# Patient Record
Sex: FEMALE | Race: WHITE | NOT HISPANIC OR LATINO | URBAN - METROPOLITAN AREA
[De-identification: names, ages, dates, MRNs, and addresses within clinical notes are randomized per-mention and may not be internally consistent; named-entity substitution may affect disease eponyms.]

---

## 2017-03-31 ENCOUNTER — CONVERSION ENCOUNTER (OUTPATIENT)
Dept: FAMILY MEDICINE CLINIC | Facility: CLINIC | Age: 47
End: 2017-03-31

## 2017-11-09 ENCOUNTER — HOSPITAL ENCOUNTER (OUTPATIENT)
Dept: FAMILY MEDICINE CLINIC | Facility: CLINIC | Age: 47
Setting detail: SPECIMEN
Discharge: HOME | End: 2017-11-09
Attending: FAMILY MEDICINE | Admitting: FAMILY MEDICINE

## 2017-11-09 ENCOUNTER — CONVERSION ENCOUNTER (OUTPATIENT)
Dept: FAMILY MEDICINE CLINIC | Facility: CLINIC | Age: 47
End: 2017-11-09

## 2017-11-09 LAB
ALBUMIN SERPL-MCNC: 3.6 G/DL (ref 3.5–4.8)
ALBUMIN/GLOB SERPL: 1.1 {RATIO} (ref 1–1.7)
ALP SERPL-CCNC: 65 IU/L (ref 32–91)
ALT SERPL-CCNC: 17 IU/L (ref 14–54)
ANION GAP SERPL CALC-SCNC: 9.9 MMOL/L (ref 10–20)
AST SERPL-CCNC: 15 IU/L (ref 15–41)
BASOPHILS # BLD AUTO: 0 10*3/UL (ref 0–0.2)
BASOPHILS NFR BLD AUTO: 1 % (ref 0–2)
BILIRUB SERPL-MCNC: 0.5 MG/DL (ref 0.3–1.2)
BUN SERPL-MCNC: 13 MG/DL (ref 8–20)
BUN/CREAT SERPL: 16.3 (ref 5.4–26.2)
CALCIUM SERPL-MCNC: 9.3 MG/DL (ref 8.9–10.3)
CHLORIDE SERPL-SCNC: 101 MMOL/L (ref 101–111)
CHOLEST SERPL-MCNC: 184 MG/DL
CHOLEST/HDLC SERPL: 4.8 {RATIO}
CONV CO2: 27 MMOL/L (ref 22–32)
CONV LDL CHOLESTEROL DIRECT: 128 MG/DL (ref 0–100)
CONV TOTAL PROTEIN: 6.8 G/DL (ref 6.1–7.9)
CREAT UR-MCNC: 0.8 MG/DL (ref 0.4–1)
DIFFERENTIAL METHOD BLD: (no result)
EOSINOPHIL # BLD AUTO: 0 % (ref 0–3)
EOSINOPHIL # BLD AUTO: 0 10*3/UL (ref 0–0.3)
ERYTHROCYTE [DISTWIDTH] IN BLOOD BY AUTOMATED COUNT: 13.3 % (ref 11.5–14.5)
GLOBULIN UR ELPH-MCNC: 3.2 G/DL (ref 2.5–3.8)
GLUCOSE SERPL-MCNC: 81 MG/DL (ref 65–99)
HCT VFR BLD AUTO: 39.8 % (ref 35–49)
HDLC SERPL-MCNC: 38 MG/DL
HGB BLD-MCNC: 13.6 G/DL (ref 12–15)
LDLC/HDLC SERPL: 3.4 {RATIO}
LIPID INTERPRETATION: ABNORMAL
LYMPHOCYTES # BLD AUTO: 2 10*3/UL (ref 0.8–4.8)
LYMPHOCYTES NFR BLD AUTO: 27 % (ref 18–42)
MCH RBC QN AUTO: 30 PG (ref 26–32)
MCHC RBC AUTO-ENTMCNC: 34.1 G/DL (ref 32–36)
MCV RBC AUTO: 87.8 FL (ref 80–94)
MONOCYTES # BLD AUTO: 0.6 10*3/UL (ref 0.1–1.3)
MONOCYTES NFR BLD AUTO: 8 % (ref 2–11)
NEUTROPHILS # BLD AUTO: 4.9 10*3/UL (ref 2.3–8.6)
NEUTROPHILS NFR BLD AUTO: 64 % (ref 50–75)
NRBC BLD AUTO-RTO: 0 /100{WBCS}
NRBC/RBC NFR BLD MANUAL: 0 10*3/UL
PLATELET # BLD AUTO: 256 10*3/UL (ref 150–450)
PMV BLD AUTO: 8 FL (ref 7.4–10.4)
POTASSIUM SERPL-SCNC: 3.9 MMOL/L (ref 3.6–5.1)
RBC # BLD AUTO: 4.53 10*6/UL (ref 4–5.4)
SODIUM SERPL-SCNC: 134 MMOL/L (ref 136–144)
TRIGL SERPL-MCNC: 177 MG/DL
TSH SERPL-ACNC: 1.94 UIU/ML (ref 0.34–5.6)
VLDLC SERPL CALC-MCNC: 18.2 MG/DL
WBC # BLD AUTO: 7.5 10*3/UL (ref 4.5–11.5)

## 2017-12-22 ENCOUNTER — HOSPITAL ENCOUNTER (OUTPATIENT)
Dept: MAMMOGRAPHY | Facility: HOSPITAL | Age: 47
Discharge: HOME | End: 2017-12-22
Attending: FAMILY MEDICINE | Admitting: FAMILY MEDICINE

## 2018-01-15 ENCOUNTER — CONVERSION ENCOUNTER (OUTPATIENT)
Dept: FAMILY MEDICINE CLINIC | Facility: CLINIC | Age: 48
End: 2018-01-15

## 2018-02-06 ENCOUNTER — HOSPITAL ENCOUNTER (OUTPATIENT)
Dept: OTHER | Facility: HOSPITAL | Age: 48
Discharge: HOME | End: 2018-02-06
Attending: SURGERY | Admitting: SURGERY

## 2018-02-06 LAB
ANION GAP SERPL CALC-SCNC: 10.8 MMOL/L (ref 10–20)
BASOPHILS # BLD AUTO: 0 10*3/UL (ref 0–0.2)
BASOPHILS NFR BLD AUTO: 1 % (ref 0–2)
BUN SERPL-MCNC: 7 MG/DL (ref 8–20)
BUN/CREAT SERPL: 8.8 (ref 5.4–26.2)
CALCIUM SERPL-MCNC: 9.4 MG/DL (ref 8.9–10.3)
CHLORIDE SERPL-SCNC: 103 MMOL/L (ref 101–111)
CONV CO2: 27 MMOL/L (ref 22–32)
CREAT UR-MCNC: 0.8 MG/DL (ref 0.4–1)
DIFFERENTIAL METHOD BLD: (no result)
EOSINOPHIL # BLD AUTO: 0.1 10*3/UL (ref 0–0.3)
EOSINOPHIL # BLD AUTO: 1 % (ref 0–3)
ERYTHROCYTE [DISTWIDTH] IN BLOOD BY AUTOMATED COUNT: 14 % (ref 11.5–14.5)
GLUCOSE SERPL-MCNC: 94 MG/DL (ref 65–99)
HCT VFR BLD AUTO: 41 % (ref 35–49)
HGB BLD-MCNC: 14 G/DL (ref 12–15)
LYMPHOCYTES # BLD AUTO: 2.6 10*3/UL (ref 0.8–4.8)
LYMPHOCYTES NFR BLD AUTO: 33 % (ref 18–42)
MCH RBC QN AUTO: 29.3 PG (ref 26–32)
MCHC RBC AUTO-ENTMCNC: 34.2 G/DL (ref 32–36)
MCV RBC AUTO: 85.6 FL (ref 80–94)
MONOCYTES # BLD AUTO: 0.5 10*3/UL (ref 0.1–1.3)
MONOCYTES NFR BLD AUTO: 7 % (ref 2–11)
NEUTROPHILS # BLD AUTO: 4.6 10*3/UL (ref 2.3–8.6)
NEUTROPHILS NFR BLD AUTO: 58 % (ref 50–75)
NRBC BLD AUTO-RTO: 0 /100{WBCS}
NRBC/RBC NFR BLD MANUAL: 0 10*3/UL
PLATELET # BLD AUTO: 284 10*3/UL (ref 150–450)
PMV BLD AUTO: 7.3 FL (ref 7.4–10.4)
POTASSIUM SERPL-SCNC: 3.8 MMOL/L (ref 3.6–5.1)
RBC # BLD AUTO: 4.79 10*6/UL (ref 4–5.4)
SODIUM SERPL-SCNC: 137 MMOL/L (ref 136–144)
WBC # BLD AUTO: 7.9 10*3/UL (ref 4.5–11.5)

## 2018-02-08 ENCOUNTER — HOSPITAL ENCOUNTER (OUTPATIENT)
Dept: PREOP | Facility: HOSPITAL | Age: 48
Setting detail: HOSPITAL OUTPATIENT SURGERY
Discharge: HOME | End: 2018-02-08
Attending: SURGERY | Admitting: SURGERY

## 2018-02-19 ENCOUNTER — CONVERSION ENCOUNTER (OUTPATIENT)
Dept: FAMILY MEDICINE CLINIC | Facility: CLINIC | Age: 48
End: 2018-02-19

## 2018-07-09 ENCOUNTER — CONVERSION ENCOUNTER (OUTPATIENT)
Dept: FAMILY MEDICINE CLINIC | Facility: CLINIC | Age: 48
End: 2018-07-09

## 2018-07-31 ENCOUNTER — OFFICE (AMBULATORY)
Dept: URBAN - METROPOLITAN AREA CLINIC 64 | Facility: CLINIC | Age: 48
End: 2018-07-31
Payer: COMMERCIAL

## 2018-07-31 VITALS
HEIGHT: 68 IN | WEIGHT: 229 LBS | HEART RATE: 80 BPM | SYSTOLIC BLOOD PRESSURE: 110 MMHG | DIASTOLIC BLOOD PRESSURE: 72 MMHG

## 2018-07-31 DIAGNOSIS — R10.30 LOWER ABDOMINAL PAIN, UNSPECIFIED: ICD-10-CM

## 2018-07-31 DIAGNOSIS — R19.7 DIARRHEA, UNSPECIFIED: ICD-10-CM

## 2018-07-31 DIAGNOSIS — K62.89 OTHER SPECIFIED DISEASES OF ANUS AND RECTUM: ICD-10-CM

## 2018-07-31 DIAGNOSIS — K62.5 HEMORRHAGE OF ANUS AND RECTUM: ICD-10-CM

## 2018-07-31 DIAGNOSIS — R15.2 FECAL URGENCY: ICD-10-CM

## 2018-07-31 PROCEDURE — 99203 OFFICE O/P NEW LOW 30 MIN: CPT | Performed by: NURSE PRACTITIONER

## 2018-07-31 RX ORDER — DICYCLOMINE HYDROCHLORIDE 20 MG/1
TABLET ORAL
Qty: 90 | Refills: 0 | Status: ACTIVE

## 2018-08-21 ENCOUNTER — OFFICE (AMBULATORY)
Dept: URBAN - METROPOLITAN AREA PATHOLOGY 4 | Facility: PATHOLOGY | Age: 48
End: 2018-08-21
Payer: COMMERCIAL

## 2018-08-21 ENCOUNTER — ON CAMPUS - OUTPATIENT (AMBULATORY)
Dept: URBAN - METROPOLITAN AREA HOSPITAL 2 | Facility: HOSPITAL | Age: 48
End: 2018-08-21
Payer: COMMERCIAL

## 2018-08-21 VITALS
DIASTOLIC BLOOD PRESSURE: 71 MMHG | RESPIRATION RATE: 16 BRPM | HEART RATE: 84 BPM | RESPIRATION RATE: 15 BRPM | SYSTOLIC BLOOD PRESSURE: 123 MMHG | OXYGEN SATURATION: 96 % | DIASTOLIC BLOOD PRESSURE: 63 MMHG | WEIGHT: 223 LBS | OXYGEN SATURATION: 97 % | HEART RATE: 78 BPM | RESPIRATION RATE: 18 BRPM | SYSTOLIC BLOOD PRESSURE: 135 MMHG | HEART RATE: 77 BPM | SYSTOLIC BLOOD PRESSURE: 118 MMHG | DIASTOLIC BLOOD PRESSURE: 55 MMHG | HEART RATE: 70 BPM | SYSTOLIC BLOOD PRESSURE: 125 MMHG | TEMPERATURE: 97.7 F | OXYGEN SATURATION: 98 % | DIASTOLIC BLOOD PRESSURE: 74 MMHG | DIASTOLIC BLOOD PRESSURE: 59 MMHG | DIASTOLIC BLOOD PRESSURE: 66 MMHG | OXYGEN SATURATION: 100 % | HEART RATE: 75 BPM | HEART RATE: 80 BPM | SYSTOLIC BLOOD PRESSURE: 133 MMHG | OXYGEN SATURATION: 99 % | HEIGHT: 68 IN | DIASTOLIC BLOOD PRESSURE: 78 MMHG | SYSTOLIC BLOOD PRESSURE: 103 MMHG | SYSTOLIC BLOOD PRESSURE: 120 MMHG | DIASTOLIC BLOOD PRESSURE: 76 MMHG | SYSTOLIC BLOOD PRESSURE: 110 MMHG | SYSTOLIC BLOOD PRESSURE: 124 MMHG | HEART RATE: 83 BPM | DIASTOLIC BLOOD PRESSURE: 70 MMHG

## 2018-08-21 DIAGNOSIS — R15.2 FECAL URGENCY: ICD-10-CM

## 2018-08-21 DIAGNOSIS — R19.7 DIARRHEA, UNSPECIFIED: ICD-10-CM

## 2018-08-21 DIAGNOSIS — K64.0 FIRST DEGREE HEMORRHOIDS: ICD-10-CM

## 2018-08-21 DIAGNOSIS — K62.5 HEMORRHAGE OF ANUS AND RECTUM: ICD-10-CM

## 2018-08-21 LAB
GI HISTOLOGY: A. UNSPECIFIED: (no result)
GI HISTOLOGY: B. UNSPECIFIED: (no result)
GI HISTOLOGY: C. UNSPECIFIED: (no result)
GI HISTOLOGY: PDF REPORT: (no result)

## 2018-08-21 PROCEDURE — 45380 COLONOSCOPY AND BIOPSY: CPT | Performed by: INTERNAL MEDICINE

## 2018-08-21 PROCEDURE — 88305 TISSUE EXAM BY PATHOLOGIST: CPT | Performed by: INTERNAL MEDICINE

## 2018-11-19 ENCOUNTER — HOSPITAL ENCOUNTER (OUTPATIENT)
Dept: FAMILY MEDICINE CLINIC | Facility: CLINIC | Age: 48
Setting detail: SPECIMEN
Discharge: HOME | End: 2018-11-19
Attending: FAMILY MEDICINE | Admitting: FAMILY MEDICINE

## 2018-11-19 ENCOUNTER — CONVERSION ENCOUNTER (OUTPATIENT)
Dept: FAMILY MEDICINE CLINIC | Facility: CLINIC | Age: 48
End: 2018-11-19

## 2018-11-19 LAB
ALBUMIN SERPL-MCNC: 3.5 G/DL (ref 3.5–4.8)
ALBUMIN/GLOB SERPL: 1.2 {RATIO} (ref 1–1.7)
ALP SERPL-CCNC: 70 IU/L (ref 32–91)
ALT SERPL-CCNC: 18 IU/L (ref 14–54)
ANION GAP SERPL CALC-SCNC: 11.5 MMOL/L (ref 10–20)
AST SERPL-CCNC: 18 IU/L (ref 15–41)
BASOPHILS # BLD AUTO: 0.1 10*3/UL (ref 0–0.2)
BASOPHILS NFR BLD AUTO: 1 % (ref 0–2)
BILIRUB SERPL-MCNC: 0.5 MG/DL (ref 0.3–1.2)
BUN SERPL-MCNC: 10 MG/DL (ref 8–20)
BUN/CREAT SERPL: 12.5 (ref 5.4–26.2)
CALCIUM SERPL-MCNC: 9 MG/DL (ref 8.9–10.3)
CHLORIDE SERPL-SCNC: 103 MMOL/L (ref 101–111)
CHOLEST SERPL-MCNC: 181 MG/DL
CHOLEST/HDLC SERPL: 4.4 {RATIO}
CONV CO2: 25 MMOL/L (ref 22–32)
CONV LDL CHOLESTEROL DIRECT: 133 MG/DL (ref 0–100)
CONV TOTAL PROTEIN: 6.5 G/DL (ref 6.1–7.9)
CREAT UR-MCNC: 0.8 MG/DL (ref 0.4–1)
DIFFERENTIAL METHOD BLD: (no result)
EOSINOPHIL # BLD AUTO: 0.1 10*3/UL (ref 0–0.3)
EOSINOPHIL # BLD AUTO: 2 % (ref 0–3)
ERYTHROCYTE [DISTWIDTH] IN BLOOD BY AUTOMATED COUNT: 13.5 % (ref 11.5–14.5)
GLOBULIN UR ELPH-MCNC: 3 G/DL (ref 2.5–3.8)
GLUCOSE SERPL-MCNC: 85 MG/DL (ref 65–99)
HCT VFR BLD AUTO: 39 % (ref 35–49)
HDLC SERPL-MCNC: 41 MG/DL
HGB BLD-MCNC: 13.4 G/DL (ref 12–15)
LDLC/HDLC SERPL: 3.2 {RATIO}
LIPID INTERPRETATION: ABNORMAL
LYMPHOCYTES # BLD AUTO: 2.2 10*3/UL (ref 0.8–4.8)
LYMPHOCYTES NFR BLD AUTO: 34 % (ref 18–42)
MCH RBC QN AUTO: 29.9 PG (ref 26–32)
MCHC RBC AUTO-ENTMCNC: 34.3 G/DL (ref 32–36)
MCV RBC AUTO: 87.2 FL (ref 80–94)
MONOCYTES # BLD AUTO: 0.5 10*3/UL (ref 0.1–1.3)
MONOCYTES NFR BLD AUTO: 7 % (ref 2–11)
NEUTROPHILS # BLD AUTO: 3.6 10*3/UL (ref 2.3–8.6)
NEUTROPHILS NFR BLD AUTO: 56 % (ref 50–75)
NRBC BLD AUTO-RTO: 0 /100{WBCS}
NRBC/RBC NFR BLD MANUAL: 0 10*3/UL
PLATELET # BLD AUTO: 250 10*3/UL (ref 150–450)
PMV BLD AUTO: 7.7 FL (ref 7.4–10.4)
POTASSIUM SERPL-SCNC: 3.5 MMOL/L (ref 3.6–5.1)
RBC # BLD AUTO: 4.47 10*6/UL (ref 4–5.4)
SODIUM SERPL-SCNC: 136 MMOL/L (ref 136–144)
TRIGL SERPL-MCNC: 105 MG/DL
VLDLC SERPL CALC-MCNC: 6.2 MG/DL
WBC # BLD AUTO: 6.5 10*3/UL (ref 4.5–11.5)

## 2018-11-27 ENCOUNTER — HOSPITAL ENCOUNTER (OUTPATIENT)
Dept: CARDIOLOGY | Facility: HOSPITAL | Age: 48
Discharge: HOME | End: 2018-11-27
Attending: FAMILY MEDICINE | Admitting: FAMILY MEDICINE

## 2019-06-04 VITALS
BODY MASS INDEX: 35.77 KG/M2 | SYSTOLIC BLOOD PRESSURE: 123 MMHG | WEIGHT: 235 LBS | OXYGEN SATURATION: 99 % | HEART RATE: 78 BPM | HEART RATE: 72 BPM | HEART RATE: 86 BPM | DIASTOLIC BLOOD PRESSURE: 78 MMHG | HEIGHT: 68 IN | WEIGHT: 236 LBS | BODY MASS INDEX: 34.82 KG/M2 | WEIGHT: 238 LBS | SYSTOLIC BLOOD PRESSURE: 115 MMHG | OXYGEN SATURATION: 97 % | SYSTOLIC BLOOD PRESSURE: 128 MMHG | DIASTOLIC BLOOD PRESSURE: 74 MMHG | DIASTOLIC BLOOD PRESSURE: 84 MMHG | HEART RATE: 84 BPM | SYSTOLIC BLOOD PRESSURE: 128 MMHG | DIASTOLIC BLOOD PRESSURE: 81 MMHG | OXYGEN SATURATION: 96 % | HEIGHT: 68 IN | OXYGEN SATURATION: 96 % | RESPIRATION RATE: 20 BRPM | WEIGHT: 231 LBS | BODY MASS INDEX: 35.77 KG/M2 | WEIGHT: 229 LBS | HEART RATE: 84 BPM | HEIGHT: 68 IN | HEART RATE: 87 BPM | BODY MASS INDEX: 35.01 KG/M2 | DIASTOLIC BLOOD PRESSURE: 75 MMHG | OXYGEN SATURATION: 97 % | DIASTOLIC BLOOD PRESSURE: 79 MMHG | SYSTOLIC BLOOD PRESSURE: 135 MMHG | SYSTOLIC BLOOD PRESSURE: 113 MMHG | WEIGHT: 236 LBS

## 2019-06-14 ENCOUNTER — HOSPITAL ENCOUNTER (OUTPATIENT)
Dept: LAB | Facility: HOSPITAL | Age: 49
Discharge: HOME | End: 2019-06-14
Attending: FAMILY MEDICINE | Admitting: FAMILY MEDICINE

## 2019-06-14 LAB
APTT BLD: 27 SEC (ref 24–31)
BASOPHILS # BLD AUTO: 0 10*3/UL (ref 0–0.2)
BASOPHILS NFR BLD AUTO: 1 % (ref 0–2)
CONV COLLAGEN/EPINEPHRINE: 123 SEC (ref 64–160)
DIFFERENTIAL METHOD BLD: (no result)
EOSINOPHIL # BLD AUTO: 0.1 10*3/UL (ref 0–0.3)
EOSINOPHIL # BLD AUTO: 1 % (ref 0–3)
ERYTHROCYTE [DISTWIDTH] IN BLOOD BY AUTOMATED COUNT: 13.1 % (ref 11.5–14.5)
HCT VFR BLD AUTO: 39.2 % (ref 35–49)
HGB BLD-MCNC: 13.4 G/DL (ref 12–15)
INR PPP: 0.9
LYMPHOCYTES # BLD AUTO: 2.1 10*3/UL (ref 0.8–4.8)
LYMPHOCYTES NFR BLD AUTO: 36 % (ref 18–42)
MCH RBC QN AUTO: 30 PG (ref 26–32)
MCHC RBC AUTO-ENTMCNC: 34.2 G/DL (ref 32–36)
MCV RBC AUTO: 87.8 FL (ref 80–94)
MONOCYTES # BLD AUTO: 0.4 10*3/UL (ref 0.1–1.3)
MONOCYTES NFR BLD AUTO: 6 % (ref 2–11)
NEUTROPHILS # BLD AUTO: 3.4 10*3/UL (ref 2.3–8.6)
NEUTROPHILS NFR BLD AUTO: 56 % (ref 50–75)
NRBC BLD AUTO-RTO: 0 /100{WBCS}
NRBC/RBC NFR BLD MANUAL: 0 10*3/UL
PLATELET # BLD AUTO: 247 10*3/UL (ref 150–450)
PMV BLD AUTO: 7.7 FL (ref 7.4–10.4)
PROTHROMBIN TIME: 9.5 SEC (ref 9.6–11.7)
RBC # BLD AUTO: 4.47 10*6/UL (ref 4–5.4)
WBC # BLD AUTO: 5.9 10*3/UL (ref 4.5–11.5)

## 2019-07-08 RX ORDER — TIZANIDINE 2 MG/1
TABLET ORAL
Qty: 90 TABLET | Refills: 3 | Status: SHIPPED | OUTPATIENT
Start: 2019-07-08 | End: 2020-12-10

## 2019-07-14 RX ORDER — LEVOTHYROXINE SODIUM 0.1 MG/1
TABLET ORAL
Qty: 30 TABLET | Refills: 0 | Status: SHIPPED | OUTPATIENT
Start: 2019-07-14 | End: 2019-08-27 | Stop reason: SDUPTHER

## 2019-07-15 NOTE — TELEPHONE ENCOUNTER
Patient has been notified that refill was sent and she will call back to schedule an appointment.

## 2019-08-12 RX ORDER — ATORVASTATIN CALCIUM 10 MG/1
TABLET, FILM COATED ORAL
Qty: 90 TABLET | Refills: 0 | Status: SHIPPED | OUTPATIENT
Start: 2019-08-12 | End: 2019-11-11 | Stop reason: SDUPTHER

## 2019-08-15 ENCOUNTER — OFFICE VISIT (OUTPATIENT)
Dept: FAMILY MEDICINE CLINIC | Facility: CLINIC | Age: 49
End: 2019-08-15

## 2019-08-15 VITALS
HEIGHT: 68 IN | DIASTOLIC BLOOD PRESSURE: 74 MMHG | BODY MASS INDEX: 35.31 KG/M2 | OXYGEN SATURATION: 98 % | SYSTOLIC BLOOD PRESSURE: 107 MMHG | HEART RATE: 73 BPM | TEMPERATURE: 98.2 F | WEIGHT: 233 LBS

## 2019-08-15 DIAGNOSIS — L23.7 POISON OAK DERMATITIS: Primary | ICD-10-CM

## 2019-08-15 PROBLEM — Z80.8 FAMILY HISTORY OF GLIOBLASTOMA: Status: ACTIVE | Noted: 2017-11-09

## 2019-08-15 PROBLEM — R51.9 HEADACHE: Status: ACTIVE | Noted: 2017-11-09

## 2019-08-15 PROBLEM — R07.9 CHEST PAIN: Status: ACTIVE | Noted: 2018-11-19

## 2019-08-15 PROBLEM — R05.9 COUGH: Status: ACTIVE | Noted: 2018-11-19

## 2019-08-15 PROBLEM — F41.9 ANXIETY DISORDER: Status: ACTIVE | Noted: 2019-08-15

## 2019-08-15 PROBLEM — E05.90 HYPERTHYROIDISM: Status: ACTIVE | Noted: 2019-08-15

## 2019-08-15 PROCEDURE — 96372 THER/PROPH/DIAG INJ SC/IM: CPT | Performed by: NURSE PRACTITIONER

## 2019-08-15 PROCEDURE — 99213 OFFICE O/P EST LOW 20 MIN: CPT | Performed by: NURSE PRACTITIONER

## 2019-08-15 RX ORDER — ALBUTEROL SULFATE 90 UG/1
2 AEROSOL, METERED RESPIRATORY (INHALATION)
COMMUNITY
Start: 2019-01-25 | End: 2022-12-27

## 2019-08-15 RX ORDER — PREDNISONE 10 MG/1
TABLET ORAL
Qty: 75 TABLET | Refills: 0 | Status: SHIPPED | OUTPATIENT
Start: 2019-08-15 | End: 2019-12-05

## 2019-08-15 RX ORDER — FLUCONAZOLE 200 MG/1
200 TABLET ORAL
COMMUNITY
Start: 2018-05-10 | End: 2019-08-15

## 2019-08-15 RX ORDER — HYDROXYZINE HYDROCHLORIDE 10 MG/1
10 TABLET, FILM COATED ORAL DAILY
COMMUNITY
Start: 2013-10-24 | End: 2020-07-07

## 2019-08-15 RX ORDER — METHYLPREDNISOLONE ACETATE 80 MG/ML
80 INJECTION, SUSPENSION INTRA-ARTICULAR; INTRALESIONAL; INTRAMUSCULAR; SOFT TISSUE ONCE
Status: COMPLETED | OUTPATIENT
Start: 2019-08-15 | End: 2019-08-15

## 2019-08-15 RX ORDER — HYDROCODONE BITARTRATE AND ACETAMINOPHEN 5; 325 MG/1; MG/1
1 TABLET ORAL
COMMUNITY
Start: 2016-12-20 | End: 2020-12-10

## 2019-08-15 RX ORDER — OMEPRAZOLE 20 MG/1
20 CAPSULE, DELAYED RELEASE ORAL DAILY
COMMUNITY

## 2019-08-15 RX ORDER — PHENAZOPYRIDINE HYDROCHLORIDE 200 MG/1
200 TABLET, FILM COATED ORAL
COMMUNITY
Start: 2016-11-15 | End: 2020-12-10

## 2019-08-15 RX ORDER — VENLAFAXINE HYDROCHLORIDE 37.5 MG/1
1 CAPSULE, EXTENDED RELEASE ORAL EVERY 24 HOURS
COMMUNITY
Start: 2017-07-03 | End: 2019-08-15

## 2019-08-15 RX ORDER — DICYCLOMINE HCL 20 MG
TABLET ORAL
COMMUNITY
Start: 2019-07-04 | End: 2022-06-30

## 2019-08-15 RX ORDER — FLUTICASONE PROPIONATE 50 MCG
SPRAY, SUSPENSION (ML) NASAL
COMMUNITY
Start: 2016-08-17

## 2019-08-15 RX ORDER — SERTRALINE HYDROCHLORIDE 100 MG/1
100 TABLET, FILM COATED ORAL DAILY
COMMUNITY
Start: 2017-06-09 | End: 2019-11-05 | Stop reason: SDUPTHER

## 2019-08-15 RX ADMIN — METHYLPREDNISOLONE ACETATE 80 MG: 80 INJECTION, SUSPENSION INTRA-ARTICULAR; INTRALESIONAL; INTRAMUSCULAR; SOFT TISSUE at 15:17

## 2019-08-15 NOTE — PROGRESS NOTES
"Subjective   Jimena Welch is a 49 y.o. female.     Patient is here today with complaints of rash on extremities and face.  She reports that this weekend she was cleaning weeds off of her fence and realized that she was in poison oak.  On Tuesday she broke out in a rash and it has since spread. It is itching and blistering.  She has been applying calycine lotion.  She is concerned about a spot getting close to her eye and on her lips.         The following portions of the patient's history were reviewed and updated as appropriate: allergies, current medications, past family history, past medical history, past social history, past surgical history and problem list.    Review of Systems   Constitutional: Negative for chills, fatigue and fever.   Eyes: Negative for blurred vision and double vision.   Respiratory: Negative for chest tightness and shortness of breath.    Cardiovascular: Negative for chest pain and palpitations.   Skin: Positive for rash.       Objective   /74 (BP Location: Left arm, Patient Position: Sitting, Cuff Size: Large Adult)   Pulse 73   Temp 98.2 °F (36.8 °C) (Oral)   Ht 172.7 cm (68\")   Wt 106 kg (233 lb)   SpO2 98%   BMI 35.43 kg/m²   Physical Exam   Constitutional: She is oriented to person, place, and time. She appears well-developed and well-nourished.   HENT:   Head: Normocephalic and atraumatic.   Cardiovascular: Normal rate and regular rhythm.   Pulmonary/Chest: Effort normal and breath sounds normal.   Neurological: She is alert and oriented to person, place, and time.   Skin: Rash noted.   Raised erythematic plaques with some blisters on bilateral arms, hands, legs, and left side of mandible, and left eyelid   Psychiatric: She has a normal mood and affect. Her behavior is normal.         Assessment/Plan   Problems Addressed this Visit     None      Visit Diagnoses     Poison oak dermatitis    -  Primary    Patient identified plant  Depo-Medrol shot given in office  Long " steroid taper  cont calazime    Relevant Medications    predniSONE (DELTASONE) 10 MG tablet    methylPREDNISolone acetate (DEPO-medrol) injection 80 mg (Start on 8/15/2019  3:33 PM)              Diagnoses and all orders for this visit:    1. Poison oak dermatitis (Primary)  Comments:  Patient identified plant  Depo-Medrol shot given in office  Long steroid taper  cont calazime  Orders:  -     predniSONE (DELTASONE) 10 MG tablet; Take by mouth 5 tabs x5 days, 4 tabs x5 days, 3 tabs x5 days, 2 tabs x5 days, 1 tab x5 days  Dispense: 75 tablet; Refill: 0  -     methylPREDNISolone acetate (DEPO-medrol) injection 80 mg

## 2019-08-15 NOTE — PATIENT INSTRUCTIONS
Depo-Medrol injection given in office today  Finish steroid taper  Go see eye doctor for evaluation of rash near her eye  Continue use of calazime cream  Make sure to clean any clothes, towels, shoes, sheets that could have came in contact with the plant oil  Call for worsening symptoms

## 2019-08-27 RX ORDER — LEVOTHYROXINE SODIUM 0.1 MG/1
TABLET ORAL
Qty: 30 TABLET | Refills: 2 | Status: SHIPPED | OUTPATIENT
Start: 2019-08-27 | End: 2019-11-07 | Stop reason: SDUPTHER

## 2019-09-17 ENCOUNTER — LAB (OUTPATIENT)
Dept: LAB | Facility: HOSPITAL | Age: 49
End: 2019-09-17

## 2019-09-17 ENCOUNTER — TRANSCRIBE ORDERS (OUTPATIENT)
Dept: ADMINISTRATIVE | Facility: HOSPITAL | Age: 49
End: 2019-09-17

## 2019-09-17 DIAGNOSIS — R04.0 RIGHT-SIDED EPISTAXIS: Primary | ICD-10-CM

## 2019-09-17 DIAGNOSIS — R04.0 RIGHT-SIDED EPISTAXIS: ICD-10-CM

## 2019-09-17 LAB
APTT PPP: 24.8 SECONDS (ref 24–31)
BASOPHILS # BLD AUTO: 0.1 10*3/MM3 (ref 0–0.2)
BASOPHILS NFR BLD AUTO: 0.7 % (ref 0–1.5)
DEPRECATED RDW RBC AUTO: 42.9 FL (ref 37–54)
EOSINOPHIL # BLD AUTO: 0.2 10*3/MM3 (ref 0–0.4)
EOSINOPHIL NFR BLD AUTO: 2.7 % (ref 0.3–6.2)
ERYTHROCYTE [DISTWIDTH] IN BLOOD BY AUTOMATED COUNT: 14 % (ref 12.3–15.4)
HCT VFR BLD AUTO: 39.1 % (ref 34–46.6)
HGB BLD-MCNC: 13.2 G/DL (ref 12–15.9)
INR PPP: 0.9 (ref 0.9–1.1)
LYMPHOCYTES # BLD AUTO: 1.9 10*3/MM3 (ref 0.7–3.1)
LYMPHOCYTES NFR BLD AUTO: 23.8 % (ref 19.6–45.3)
MCH RBC QN AUTO: 29.4 PG (ref 26.6–33)
MCHC RBC AUTO-ENTMCNC: 33.8 G/DL (ref 31.5–35.7)
MCV RBC AUTO: 87.1 FL (ref 79–97)
MONOCYTES # BLD AUTO: 0.6 10*3/MM3 (ref 0.1–0.9)
MONOCYTES NFR BLD AUTO: 7.7 % (ref 5–12)
NEUTROPHILS # BLD AUTO: 5.2 10*3/MM3 (ref 1.7–7)
NEUTROPHILS NFR BLD AUTO: 65.1 % (ref 42.7–76)
NRBC BLD AUTO-RTO: 0 /100 WBC (ref 0–0.2)
PLATELET # BLD AUTO: 227 10*3/MM3 (ref 140–450)
PMV BLD AUTO: 6.9 FL (ref 6–12)
PROTHROMBIN TIME: 9.5 SECONDS (ref 9.6–11.7)
RBC # BLD AUTO: 4.48 10*6/MM3 (ref 3.77–5.28)
WBC NRBC COR # BLD: 8 10*3/MM3 (ref 3.4–10.8)

## 2019-09-17 PROCEDURE — 85730 THROMBOPLASTIN TIME PARTIAL: CPT

## 2019-09-17 PROCEDURE — 85610 PROTHROMBIN TIME: CPT

## 2019-09-17 PROCEDURE — 85025 COMPLETE CBC W/AUTO DIFF WBC: CPT

## 2019-09-17 PROCEDURE — 36415 COLL VENOUS BLD VENIPUNCTURE: CPT

## 2019-09-20 ENCOUNTER — HOSPITAL ENCOUNTER (OUTPATIENT)
Dept: CARDIOLOGY | Facility: HOSPITAL | Age: 49
Discharge: HOME | End: 2019-09-20
Admitting: ANESTHESIOLOGY

## 2019-09-20 ENCOUNTER — TRANSCRIBE ORDERS (OUTPATIENT)
Dept: ADMINISTRATIVE | Facility: HOSPITAL | Age: 49
End: 2019-09-20

## 2019-09-20 DIAGNOSIS — R04.0 BLEEDING NOSE: Primary | ICD-10-CM

## 2019-09-20 DIAGNOSIS — R04.0 BLEEDING NOSE: ICD-10-CM

## 2019-09-20 PROCEDURE — 93005 ELECTROCARDIOGRAM TRACING: CPT | Performed by: ANESTHESIOLOGY

## 2019-09-30 PROCEDURE — 93010 ELECTROCARDIOGRAM REPORT: CPT | Performed by: INTERNAL MEDICINE

## 2019-11-05 RX ORDER — SERTRALINE HYDROCHLORIDE 100 MG/1
100 TABLET, FILM COATED ORAL DAILY
Qty: 90 TABLET | Refills: 0 | Status: SHIPPED | OUTPATIENT
Start: 2019-11-05 | End: 2020-02-04

## 2019-11-07 RX ORDER — LEVOTHYROXINE SODIUM 0.1 MG/1
100 TABLET ORAL DAILY
Qty: 30 TABLET | Refills: 0 | Status: SHIPPED | OUTPATIENT
Start: 2019-11-07 | End: 2019-12-04 | Stop reason: SDUPTHER

## 2019-11-11 RX ORDER — ATORVASTATIN CALCIUM 10 MG/1
10 TABLET, FILM COATED ORAL DAILY
Qty: 90 TABLET | Refills: 0 | Status: SHIPPED | OUTPATIENT
Start: 2019-11-11 | End: 2020-02-11

## 2019-12-05 ENCOUNTER — LAB (OUTPATIENT)
Dept: FAMILY MEDICINE CLINIC | Facility: CLINIC | Age: 49
End: 2019-12-05

## 2019-12-05 ENCOUNTER — OFFICE VISIT (OUTPATIENT)
Dept: FAMILY MEDICINE CLINIC | Facility: CLINIC | Age: 49
End: 2019-12-05

## 2019-12-05 VITALS
HEART RATE: 79 BPM | DIASTOLIC BLOOD PRESSURE: 79 MMHG | HEIGHT: 68 IN | BODY MASS INDEX: 35.71 KG/M2 | WEIGHT: 235.6 LBS | OXYGEN SATURATION: 98 % | SYSTOLIC BLOOD PRESSURE: 115 MMHG

## 2019-12-05 DIAGNOSIS — Z12.39 BREAST CANCER SCREENING: ICD-10-CM

## 2019-12-05 DIAGNOSIS — Z00.00 PREVENTATIVE HEALTH CARE: ICD-10-CM

## 2019-12-05 DIAGNOSIS — R07.9 CHEST PAIN, UNSPECIFIED TYPE: ICD-10-CM

## 2019-12-05 DIAGNOSIS — R53.83 FATIGUE, UNSPECIFIED TYPE: ICD-10-CM

## 2019-12-05 DIAGNOSIS — E03.9 HYPOTHYROIDISM, UNSPECIFIED TYPE: ICD-10-CM

## 2019-12-05 DIAGNOSIS — E78.5 HYPERLIPIDEMIA, UNSPECIFIED HYPERLIPIDEMIA TYPE: ICD-10-CM

## 2019-12-05 DIAGNOSIS — G47.00 INSOMNIA, UNSPECIFIED TYPE: Primary | ICD-10-CM

## 2019-12-05 DIAGNOSIS — Z23 NEED FOR VACCINATION: ICD-10-CM

## 2019-12-05 LAB
25(OH)D3 SERPL-MCNC: 25.4 NG/ML (ref 30–100)
ALBUMIN SERPL-MCNC: 4.4 G/DL (ref 3.5–5.2)
ALBUMIN/GLOB SERPL: 1.3 G/DL
ALP SERPL-CCNC: 79 U/L (ref 39–117)
ALT SERPL W P-5'-P-CCNC: 15 U/L (ref 1–33)
ANION GAP SERPL CALCULATED.3IONS-SCNC: 10.3 MMOL/L (ref 5–15)
AST SERPL-CCNC: 13 U/L (ref 1–32)
BILIRUB SERPL-MCNC: 0.4 MG/DL (ref 0.2–1.2)
BUN BLD-MCNC: 13 MG/DL (ref 6–20)
BUN/CREAT SERPL: 14.8 (ref 7–25)
CALCIUM SPEC-SCNC: 9.4 MG/DL (ref 8.6–10.5)
CHLORIDE SERPL-SCNC: 101 MMOL/L (ref 98–107)
CHOLEST SERPL-MCNC: 193 MG/DL (ref 0–200)
CO2 SERPL-SCNC: 28.7 MMOL/L (ref 22–29)
CREAT BLD-MCNC: 0.88 MG/DL (ref 0.57–1)
FOLATE SERPL-MCNC: >20 NG/ML (ref 4.78–24.2)
GFR SERPL CREATININE-BSD FRML MDRD: 68 ML/MIN/1.73
GLOBULIN UR ELPH-MCNC: 3.3 GM/DL
GLUCOSE BLD-MCNC: 102 MG/DL (ref 65–99)
HBA1C MFR BLD: 4.8 % (ref 3.5–5.6)
HDLC SERPL-MCNC: 39 MG/DL (ref 40–60)
LDLC SERPL CALC-MCNC: 126 MG/DL (ref 0–100)
LDLC/HDLC SERPL: 3.23 {RATIO}
POTASSIUM BLD-SCNC: 4.1 MMOL/L (ref 3.5–5.2)
PROT SERPL-MCNC: 7.7 G/DL (ref 6–8.5)
SODIUM BLD-SCNC: 140 MMOL/L (ref 136–145)
TRIGL SERPL-MCNC: 141 MG/DL (ref 0–150)
TSH SERPL DL<=0.05 MIU/L-ACNC: 1.96 UIU/ML (ref 0.27–4.2)
VIT B12 BLD-MCNC: >2000 PG/ML (ref 211–946)
VLDLC SERPL-MCNC: 28.2 MG/DL

## 2019-12-05 PROCEDURE — 80053 COMPREHEN METABOLIC PANEL: CPT | Performed by: NURSE PRACTITIONER

## 2019-12-05 PROCEDURE — 90471 IMMUNIZATION ADMIN: CPT | Performed by: NURSE PRACTITIONER

## 2019-12-05 PROCEDURE — 82746 ASSAY OF FOLIC ACID SERUM: CPT | Performed by: NURSE PRACTITIONER

## 2019-12-05 PROCEDURE — 36415 COLL VENOUS BLD VENIPUNCTURE: CPT

## 2019-12-05 PROCEDURE — 90674 CCIIV4 VAC NO PRSV 0.5 ML IM: CPT | Performed by: NURSE PRACTITIONER

## 2019-12-05 PROCEDURE — 99396 PREV VISIT EST AGE 40-64: CPT | Performed by: NURSE PRACTITIONER

## 2019-12-05 PROCEDURE — 84443 ASSAY THYROID STIM HORMONE: CPT | Performed by: NURSE PRACTITIONER

## 2019-12-05 PROCEDURE — 83036 HEMOGLOBIN GLYCOSYLATED A1C: CPT | Performed by: NURSE PRACTITIONER

## 2019-12-05 PROCEDURE — 93000 ELECTROCARDIOGRAM COMPLETE: CPT | Performed by: NURSE PRACTITIONER

## 2019-12-05 PROCEDURE — 82306 VITAMIN D 25 HYDROXY: CPT | Performed by: NURSE PRACTITIONER

## 2019-12-05 PROCEDURE — 82607 VITAMIN B-12: CPT | Performed by: NURSE PRACTITIONER

## 2019-12-05 PROCEDURE — 80061 LIPID PANEL: CPT | Performed by: NURSE PRACTITIONER

## 2019-12-05 RX ORDER — LEVOTHYROXINE SODIUM 0.1 MG/1
TABLET ORAL
Qty: 30 TABLET | Refills: 0 | Status: SHIPPED | OUTPATIENT
Start: 2019-12-05 | End: 2020-01-06

## 2019-12-05 RX ORDER — VENLAFAXINE HYDROCHLORIDE 37.5 MG/1
CAPSULE, EXTENDED RELEASE ORAL
COMMUNITY
Start: 2019-08-28 | End: 2019-12-05 | Stop reason: SDUPTHER

## 2019-12-05 NOTE — PROGRESS NOTES
"Subjective   Jimena Welch is a 49 y.o. female.     Pt is here today for her CPE and to discuss insomnia.  She states that she would like to try to get off of some of her medications that could be making her tired.  She was on effexor in the past for hot flashes, but recently started cohosh and reports that it has done best for her hot flashes.  She believes that she has not been taking the effexor for a couple of weeks, but will double check.    Hypothyroid-  Had her thyroid removed.  Had nodules when she was 11.  She states that her thyroid \"\" so they removed it.  She is currently taking synthroid 100 mcg.    Interstitial Cystitis-  Sees urology.  Takes pyridium and hydroxyzine.  Watches her diet.    GERD- takes 20mg omeprazole occasionally.  It is controlled fine.    Anxiety- takes 100mg zoloft daily.  Controls it for the most part.  Has some anxious days, but she does not want any new medication or changes.    Hyperlipidemia- Takes 10mg lipitor.  She reports that she does not get much exercise in and does not eat as well of a balanced as she would like.    Sleep apnea- does not use her machine often.  She just bought new tubing and is going to clean it.  She is going to try to start using it regularly.    Fatigue/insomnia- Reports that she is tired all the time.  She believes it could be due to some of the medication.  She has sleep apnea but has not been using her machine.  She states that he mind races at night. She takes melatonin 1mg to help with sleep.    Left arm and chest pain- states that she has been having some pain off and on.  She states that it started about 1 year ago.  Happens about 3 times a week. The pain goes down her arm and tingles at times.  The pain will radiate into the left chest.  The pain comes and goes with 30 seconds to a minute. She reports shortness of breath all the time, especially with exertion. She states that she has a history of frozen shoulder.  She is interested in a " "cardiac screening. Has no cardiac history.         Labs- due  Pap smear- utd  Mammogram- due, priority radiology  DEXA-n/a  Colonoscopy- utd 2018    Vaccines:  Flu- done today  PNA- n/a  Shingles-n/a  Tdap- no    Dental exam- due  Eye exam- utd    The following portions of the patient's history were reviewed and updated as appropriate: allergies, current medications, past family history, past medical history, past social history, past surgical history and problem list.    Review of Systems   Constitutional: Positive for fatigue. Negative for appetite change, chills and fever.   HENT: Negative for congestion, ear pain, hearing loss, postnasal drip, rhinorrhea, sinus pressure, sore throat, swollen glands and trouble swallowing.    Eyes: Negative for blurred vision, double vision, pain, discharge, itching and visual disturbance.   Respiratory: Positive for cough (believes it is due to allergies) and shortness of breath (with exertion). Negative for chest tightness and wheezing.    Cardiovascular: Positive for chest pain. Negative for palpitations and leg swelling.   Gastrointestinal: Positive for diarrhea (comes and goes). Negative for abdominal pain, blood in stool, constipation, nausea and vomiting.   Endocrine: Negative for polydipsia, polyphagia and polyuria.   Genitourinary: Negative for dysuria, flank pain, frequency and urgency.   Musculoskeletal: Negative for arthralgias, back pain and myalgias.   Skin: Negative for rash and skin lesions.   Neurological: Positive for dizziness (occasional). Negative for weakness, numbness and headache.   Psychiatric/Behavioral: Positive for sleep disturbance. Negative for decreased concentration, depressed mood and stress. The patient is nervous/anxious.        Objective   /79 (BP Location: Left arm, Patient Position: Sitting, Cuff Size: Large Adult)   Pulse 79   Ht 172.7 cm (68\")   Wt 107 kg (235 lb 9.6 oz)   SpO2 98%   BMI 35.82 kg/m²     Physical Exam "   Constitutional: She is oriented to person, place, and time. She appears well-developed and well-nourished. No distress.   HENT:   Head: Normocephalic and atraumatic.   Eyes: Conjunctivae and EOM are normal. Pupils are equal, round, and reactive to light. Right eye exhibits no discharge. Left eye exhibits no discharge.   Neck: Normal range of motion. Neck supple.   Cardiovascular: Normal rate and regular rhythm.   Pulmonary/Chest: Effort normal and breath sounds normal. No respiratory distress. She has no wheezes. She has no rales.   Abdominal: Soft. Normal appearance and bowel sounds are normal. She exhibits no distension. There is no tenderness.   Musculoskeletal: Normal range of motion.   Neurological: She is alert and oriented to person, place, and time.   Skin: Skin is warm and dry. She is not diaphoretic.   Psychiatric: She has a normal mood and affect. Her behavior is normal. Thought content normal.   Vitals reviewed.        Assessment/Plan     Diagnoses and all orders for this visit:    1. Insomnia, unspecified type (Primary)  Comments:  has JACINTO but has not been using machine  start cpap  stopped effexor  check labs    2. Chest pain, unspecified type  Comments:  EKG normal  cardiology referral  unknown etiology  Orders:  -     ECG 12 Lead  -     Ambulatory Referral to Cardiology    3. Breast cancer screening  -     Mammo Screening Digital Tomosynthesis Bilateral With CAD; Future    4. Hyperlipidemia, unspecified hyperlipidemia type  -     Lipid Panel; Future    5. Preventative health care  Comments:  work on diet and exercise  cont current meds  Orders:  -     Comprehensive Metabolic Panel; Future  -     Hemoglobin A1c; Future  -     TSH; Future    6. Hypothyroidism, unspecified type  -     TSH; Future    7. Fatigue, unspecified type  Comments:  unknown etiology  check labs  cpap  Orders:  -     Vitamin B12; Future  -     Folate; Future  -     Vitamin D 25 hydroxy; Future    8. Need for vaccination  -      Flucelvax Quad=>4Years (PFS)        ECG 12 Lead  Date/Time: 12/5/2019 9:06 PM  Performed by: Carolin Calero APRN  Authorized by: Carolin Calero APRN   Rhythm: sinus rhythm  Rate: normal  Conduction: conduction normal  T Waves: T waves normal  QRS axis: normal    Clinical impression: normal ECG

## 2019-12-05 NOTE — PATIENT INSTRUCTIONS
Cont current meds  EKG is normal  Work on increasing exercise and portion control  Cardiology referral   Go to ER for CP  Obtain labs  Start using CPAP machine  Check to see if you have been taking the effexor  Complete mammogram

## 2019-12-06 RX ORDER — VENLAFAXINE HYDROCHLORIDE 37.5 MG/1
37.5 CAPSULE, EXTENDED RELEASE ORAL DAILY
Qty: 90 CAPSULE | Refills: 4 | OUTPATIENT
Start: 2019-12-06

## 2019-12-06 RX ORDER — VENLAFAXINE HYDROCHLORIDE 37.5 MG/1
37.5 CAPSULE, EXTENDED RELEASE ORAL DAILY
COMMUNITY
End: 2020-02-04

## 2019-12-08 RX ORDER — ERGOCALCIFEROL 1.25 MG/1
50000 CAPSULE ORAL WEEKLY
Qty: 12 CAPSULE | Refills: 0 | Status: SHIPPED | OUTPATIENT
Start: 2019-12-08 | End: 2020-02-24

## 2019-12-13 RX ORDER — VENLAFAXINE HYDROCHLORIDE 37.5 MG/1
37.5 CAPSULE, EXTENDED RELEASE ORAL DAILY
Qty: 90 CAPSULE | Refills: 4 | OUTPATIENT
Start: 2019-12-13

## 2019-12-17 RX ORDER — VENLAFAXINE HYDROCHLORIDE 37.5 MG/1
37.5 CAPSULE, EXTENDED RELEASE ORAL DAILY
Qty: 90 CAPSULE | OUTPATIENT
Start: 2019-12-17

## 2019-12-19 ENCOUNTER — OFFICE VISIT (OUTPATIENT)
Dept: CARDIOLOGY | Facility: CLINIC | Age: 49
End: 2019-12-19

## 2019-12-19 VITALS
BODY MASS INDEX: 36.07 KG/M2 | HEIGHT: 68 IN | OXYGEN SATURATION: 99 % | WEIGHT: 238 LBS | DIASTOLIC BLOOD PRESSURE: 76 MMHG | HEART RATE: 69 BPM | SYSTOLIC BLOOD PRESSURE: 112 MMHG

## 2019-12-19 DIAGNOSIS — E78.00 PURE HYPERCHOLESTEROLEMIA: ICD-10-CM

## 2019-12-19 DIAGNOSIS — G47.33 OBSTRUCTIVE SLEEP APNEA: ICD-10-CM

## 2019-12-19 DIAGNOSIS — I20.9 ANGINA PECTORIS (HCC): Primary | ICD-10-CM

## 2019-12-19 PROCEDURE — 99203 OFFICE O/P NEW LOW 30 MIN: CPT | Performed by: INTERNAL MEDICINE

## 2019-12-19 RX ORDER — AZELASTINE 1 MG/ML
SPRAY, METERED NASAL
COMMUNITY
Start: 2019-12-17

## 2019-12-19 NOTE — PROGRESS NOTES
"    Subjective:     Encounter Date:12/19/2019      Patient ID: Jimena Welch is a 49 y.o. female.    Chief Complaint:  History of Present Illness 49-year-old white female with history of sleep apnea hyperlipidemia and strong family stable on his presents to my office for a new consultation.  Patient has been having symptoms of chest pain which radiated to the left arm.  Patient symptoms are occurring mostly with exertion as well as with shortness of breath.  No complaint of any PND orthopnea.  She also has occasional palpitation with dizziness.  No syncope or swelling of the feet.  She has been taking her medicines regularly.  She is having the symptoms especially with exertion relieved with rest.  No history of smoking.  She has strong family still coronary disease.  /76   Pulse 69   Ht 172.7 cm (68\")   Wt 108 kg (238 lb)   SpO2 99%   BMI 36.19 kg/m²     The following portions of the patient's history were reviewed and updated as appropriate: allergies, current medications, past family history, past medical history, past social history, past surgical history and problem list.  Past Medical History:   Diagnosis Date   • Anxiety    • Asthma    • Hyperlipidemia    • Obesity      Past Surgical History:   Procedure Laterality Date   • BLADDER SURGERY     • HYSTERECTOMY     • NASAL SEPTUM SURGERY      2019   • THYROIDECTOMY     • WISDOM TOOTH EXTRACTION       Social History     Socioeconomic History   • Marital status:      Spouse name: Not on file   • Number of children: Not on file   • Years of education: Not on file   • Highest education level: Not on file   Tobacco Use   • Smoking status: Never Smoker   • Smokeless tobacco: Never Used   Substance and Sexual Activity   • Alcohol use: No     Frequency: Never   • Drug use: No     Family History   Problem Relation Age of Onset   • Heart disease Father        Current Outpatient Medications:   •  albuterol sulfate  (90 Base) MCG/ACT inhaler, " Inhale 2 puffs., Disp: , Rfl:   •  atorvastatin (LIPITOR) 10 MG tablet, Take 1 tablet by mouth Daily., Disp: 90 tablet, Rfl: 0  •  azelastine (ASTELIN) 0.1 % nasal spray, , Disp: , Rfl:   •  dicyclomine (BENTYL) 20 MG tablet, , Disp: , Rfl:   •  ergocalciferol (DRISDOL) 1.25 MG (90054 UT) capsule, Take 1 capsule by mouth 1 (One) Time Per Week for 12 doses., Disp: 12 capsule, Rfl: 0  •  fluticasone (FLONASE) 50 MCG/ACT nasal spray, into the nostril(s) as directed by provider., Disp: , Rfl:   •  HYDROcodone-acetaminophen (NORCO) 5-325 MG per tablet, Take 1 tablet by mouth., Disp: , Rfl:   •  hydrOXYzine (ATARAX) 10 MG tablet, Take 10 mg by mouth Daily., Disp: , Rfl:   •  levothyroxine (SYNTHROID, LEVOTHROID) 100 MCG tablet, TAKE 1 TABLET DAILY, Disp: 30 tablet, Rfl: 0  •  omeprazole (priLOSEC) 20 MG capsule, Take 20 mg by mouth Daily., Disp: , Rfl:   •  phenazopyridine (PYRIDIUM) 200 MG tablet, Take 200 mg by mouth., Disp: , Rfl:   •  Prasterone 6.5 MG insert, Insert 1 suppository into the vagina Daily., Disp: , Rfl:   •  sertraline (ZOLOFT) 100 MG tablet, Take 1 tablet by mouth Daily., Disp: 90 tablet, Rfl: 0  •  tiZANidine (ZANAFLEX) 2 MG tablet, TAKE 1 TO 2 TABLETS AT NIGHT AS NEEDED, Disp: 90 tablet, Rfl: 3  •  venlafaxine XR (EFFEXOR-XR) 37.5 MG 24 hr capsule, Take 37.5 mg by mouth Daily. TAKE 1 CAP DAILY, Disp: , Rfl:   No Known Allergies    Review of Systems   Constitution: Positive for malaise/fatigue. Negative for fever.   HENT: Negative for ear pain and nosebleeds.    Eyes: Negative for blurred vision and double vision.   Cardiovascular: Positive for chest pain and irregular heartbeat. Negative for dyspnea on exertion, leg swelling and palpitations.   Respiratory: Positive for shortness of breath. Negative for cough.    Skin: Negative for rash.   Musculoskeletal: Negative for joint pain.   Gastrointestinal: Negative for abdominal pain, nausea and vomiting.   Neurological: Positive for dizziness. Negative for  focal weakness, headaches and numbness.   Psychiatric/Behavioral: Negative for depression. The patient is not nervous/anxious.    All other systems reviewed and are negative.             Objective:     Physical Exam   Constitutional: She appears well-developed and well-nourished.   HENT:   Head: Normocephalic and atraumatic.   Eyes: Conjunctivae are normal. No scleral icterus.   Neck: Normal range of motion. Neck supple. No JVD present. Carotid bruit is not present.   Cardiovascular: Normal rate, regular rhythm, S1 normal, S2 normal, normal heart sounds and intact distal pulses. PMI is not displaced.   Pulmonary/Chest: Effort normal and breath sounds normal. She has no wheezes. She has no rales.   Abdominal: Soft. Bowel sounds are normal.   Neurological: She is alert. She has normal strength.   Skin: Skin is warm and dry. No rash noted.       Procedures    Lab Review:       Assessment:          Diagnosis Plan   1. Angina pectoris (CMS/HCC)     2. Pure hypercholesterolemia     3. Obstructive sleep apnea            Plan:       Patient has been having symptoms of chest pain and had a treadmill in the past which was normal but she continues to have episodes of chest pain hence I will perform a stress Myoview study to rule out ischemia  Sleep apnea and uses CPAP machine  Patient has hyperlipidemia and she is using medications.  Further treatment based on stress test finding

## 2020-01-03 ENCOUNTER — APPOINTMENT (OUTPATIENT)
Dept: CARDIOLOGY | Facility: HOSPITAL | Age: 50
End: 2020-01-03

## 2020-01-06 RX ORDER — LEVOTHYROXINE SODIUM 0.1 MG/1
TABLET ORAL
Qty: 30 TABLET | Refills: 3 | Status: SHIPPED | OUTPATIENT
Start: 2020-01-06 | End: 2020-01-10 | Stop reason: SDUPTHER

## 2020-01-08 ENCOUNTER — APPOINTMENT (OUTPATIENT)
Dept: CARDIOLOGY | Facility: HOSPITAL | Age: 50
End: 2020-01-08

## 2020-01-10 RX ORDER — LEVOTHYROXINE SODIUM 0.1 MG/1
100 TABLET ORAL DAILY
Qty: 90 TABLET | Refills: 1 | Status: SHIPPED | OUTPATIENT
Start: 2020-01-10 | End: 2020-07-08

## 2020-01-15 ENCOUNTER — APPOINTMENT (OUTPATIENT)
Dept: CARDIOLOGY | Facility: HOSPITAL | Age: 50
End: 2020-01-15

## 2020-02-04 RX ORDER — SERTRALINE HYDROCHLORIDE 100 MG/1
TABLET, FILM COATED ORAL
Qty: 90 TABLET | Refills: 4 | Status: SHIPPED | OUTPATIENT
Start: 2020-02-04 | End: 2021-01-20 | Stop reason: SDUPTHER

## 2020-02-11 RX ORDER — ATORVASTATIN CALCIUM 10 MG/1
TABLET, FILM COATED ORAL
Qty: 90 TABLET | Refills: 4 | Status: SHIPPED | OUTPATIENT
Start: 2020-02-11 | End: 2021-01-20 | Stop reason: SDUPTHER

## 2020-02-16 RX ORDER — ERGOCALCIFEROL 1.25 MG/1
CAPSULE ORAL
Qty: 4 CAPSULE | Refills: 0 | OUTPATIENT
Start: 2020-02-16

## 2020-04-17 ENCOUNTER — TELEPHONE (OUTPATIENT)
Dept: FAMILY MEDICINE CLINIC | Facility: CLINIC | Age: 50
End: 2020-04-17

## 2020-04-17 ENCOUNTER — OFFICE VISIT (OUTPATIENT)
Dept: FAMILY MEDICINE CLINIC | Facility: CLINIC | Age: 50
End: 2020-04-17

## 2020-04-17 VITALS
WEIGHT: 249 LBS | DIASTOLIC BLOOD PRESSURE: 80 MMHG | SYSTOLIC BLOOD PRESSURE: 128 MMHG | OXYGEN SATURATION: 95 % | TEMPERATURE: 97.3 F | HEART RATE: 79 BPM | BODY MASS INDEX: 37.86 KG/M2

## 2020-04-17 DIAGNOSIS — M25.552 LEFT HIP PAIN: Primary | ICD-10-CM

## 2020-04-17 DIAGNOSIS — M54.42 ACUTE LEFT-SIDED LOW BACK PAIN WITH LEFT-SIDED SCIATICA: ICD-10-CM

## 2020-04-17 PROCEDURE — 99213 OFFICE O/P EST LOW 20 MIN: CPT | Performed by: NURSE PRACTITIONER

## 2020-04-17 RX ORDER — PREDNISONE 50 MG/1
50 TABLET ORAL DAILY
Qty: 4 TABLET | Refills: 0 | Status: SHIPPED | OUTPATIENT
Start: 2020-04-17 | End: 2020-04-21

## 2020-04-17 RX ORDER — PREDNISONE 50 MG/1
50 TABLET ORAL DAILY
Qty: 4 TABLET | Refills: 0 | Status: SHIPPED | OUTPATIENT
Start: 2020-04-17 | End: 2020-04-17

## 2020-04-17 RX ORDER — KETOROLAC TROMETHAMINE 30 MG/ML
60 INJECTION, SOLUTION INTRAMUSCULAR; INTRAVENOUS ONCE
Status: SHIPPED | OUTPATIENT
Start: 2020-04-17 | End: 2020-04-22

## 2020-04-17 NOTE — TELEPHONE ENCOUNTER
[facility] called. They received rx for prednisone today. Asking if ou were wanting them to fill that or if you meant to send it to a local pharmacy as it is for 4 days supply and it takes awhile for them to send rx out to pt.

## 2020-04-17 NOTE — PROGRESS NOTES
"Subjective   Jimena Welch is a 50 y.o. female.     Pt is here today with c/o low back pain and left hip/leg pain.  Pain has been occurring for a couple of months, but has worsened in the last few weeks.  It used to come and go, but now it is constant.  She recently started a new job at [employer].  She is standing longer on concrete.  Denies any known injury.  Doesn't feel like she has been lifting more than normal, but she is constant moving items.  The pain is in the lower back, but the worse pain starts mid lateral thigh.    It is tender to palpation.  It is a burning sensation.   Has some slight numbness/tingling.  She has a history of herniated discs.  Rates the pain a 9/10.  She has been taking ibuprofen and aleve, but it is only helping minimally.  She has to leave work due to the pain.  She is able to apply pressure, but she is walking with a limp.  Leg does not \"give out\".  She is on norco for her interstitial cystitis at night.  She has Zanaflex at home and tried taking one last night, which allowed her to sleep, but didn't seem to help this morning.  She is seeing chiropractor/physical therapy at this time for her back pain.         The following portions of the patient's history were reviewed and updated as appropriate: allergies, current medications, past family history, past medical history, past social history, past surgical history and problem list.    Review of Systems   Constitutional: Negative for chills, fatigue and fever.   Respiratory: Negative for chest tightness and shortness of breath.    Cardiovascular: Negative for chest pain and palpitations.   Gastrointestinal: Negative for constipation and diarrhea.   Genitourinary: Negative for urinary incontinence.   Musculoskeletal: Positive for arthralgias and back pain.   Neurological: Positive for numbness. Negative for dizziness and headache.       Objective   /80 (BP Location: Left arm, Patient Position: Sitting, Cuff Size: Adult)   " Pulse 79   Temp 97.3 °F (36.3 °C) (Oral)   Wt 113 kg (249 lb)   SpO2 95%   BMI 37.86 kg/m²   Physical Exam   Constitutional: She is oriented to person, place, and time. She appears well-developed and well-nourished. No distress (mild discomfort).   HENT:   Head: Normocephalic and atraumatic.   Cardiovascular: Normal rate, regular rhythm and normal heart sounds.   Pulmonary/Chest: Effort normal and breath sounds normal. No respiratory distress.   Musculoskeletal: Normal range of motion. She exhibits tenderness (tenderness along the trochanteric region of left hip and into lateral thigh. normal straight leg test.  normal ROM left hip). She exhibits no edema.   Neurological: She is alert and oriented to person, place, and time.   Skin: No erythema.   Psychiatric: She has a normal mood and affect. Her behavior is normal. Judgment and thought content normal.         Assessment/Plan     Diagnoses and all orders for this visit:    1. Left hip pain (Primary)  Comments:  appears to be possible bursitis  home stretches given  toradol injection and prednisone Rx  PT ordered  off work cait  call if no improvement    Orders:  -     ketorolac (TORADOL) injection 60 mg  -     predniSONE (DELTASONE) 50 MG tablet; Take 1 tablet by mouth Daily for 4 days.  Dispense: 4 tablet; Refill: 0  -     Ambulatory Referral to Physical Therapy Evaluate and treat    2. Acute left-sided low back pain with left-sided sciatica  Comments:  hx herniated discs  toradol today and prednisone at home  PT ordered  call if no improvement  Orders:  -     Ambulatory Referral to Physical Therapy Evaluate and treat

## 2020-04-17 NOTE — PATIENT INSTRUCTIONS
toradol injection given today- no other NSAIDs  Start steroid tomorrow  Physical therapy ordered  Call if no improvement  Muscle relaxant at night

## 2020-07-01 NOTE — PROGRESS NOTES
"Subjective   Jimena Welch is a 50 y.o. female.     Pt is here today with c/o a mass on right foot in the heel region.  Pt states that it appeared back in the fall.  She states that she wore boots one day and the next day started having heel pain.  The pain comes and goes.  Pain improved when she wears shoes with arches.  Pain is in the achilles tendon region.  Pain worsens when she is barefoot.  Rates the pain a 8/10 on occasions and other times it is more mild.  Describes the pain as a pulling and sharp sensation.  Denies any numbness or tingling in the foot.  She has tried taking tylenol which helps some.  She occasionally gets swelling in the heel region.       The following portions of the patient's history were reviewed and updated as appropriate: allergies, current medications, past family history, past medical history, past social history, past surgical history and problem list.    Review of Systems   Constitutional: Negative for chills, fatigue and fever.   Respiratory: Negative for chest tightness and shortness of breath.    Cardiovascular: Negative for chest pain and palpitations.   Musculoskeletal: Positive for arthralgias (left foot pain) and joint swelling (in heel area).   Neurological: Negative for dizziness and headache.       Objective   /66 (BP Location: Left arm, Patient Position: Sitting, Cuff Size: Large Adult)   Pulse 75   Temp 97.5 °F (36.4 °C) (Temporal)   Ht 172.7 cm (68\")   Wt 107 kg (235 lb)   SpO2 97%   BMI 35.73 kg/m²   Physical Exam   Constitutional: She is oriented to person, place, and time. She appears well-developed and well-nourished. No distress.   HENT:   Head: Normocephalic and atraumatic.   Cardiovascular: Normal rate, regular rhythm and normal heart sounds.   No murmur heard.  Pulmonary/Chest: Effort normal and breath sounds normal. No respiratory distress.   Musculoskeletal: Normal range of motion. She exhibits tenderness (right lateral upper heel ). She " exhibits no edema.   Nodule on right heel in achilles region.  No swelling. tenderness   Neurological: She is alert and oriented to person, place, and time.   Skin: Skin is dry. No erythema.   Psychiatric: She has a normal mood and affect. Her behavior is normal. Judgment and thought content normal.         Assessment/Plan     Diagnoses and all orders for this visit:    1. Pain of right heel (Primary)  Comments:  unknown etiology  obtain xray   referral to podiatry  wear supportive shoes  ibuprofen as needed  RICE  Orders:  -     Ambulatory Referral to Podiatry  -     XR Ankle 3+ View Right; Future

## 2020-07-02 ENCOUNTER — OFFICE VISIT (OUTPATIENT)
Dept: FAMILY MEDICINE CLINIC | Facility: CLINIC | Age: 50
End: 2020-07-02

## 2020-07-02 VITALS
HEIGHT: 68 IN | OXYGEN SATURATION: 97 % | TEMPERATURE: 97.5 F | SYSTOLIC BLOOD PRESSURE: 119 MMHG | DIASTOLIC BLOOD PRESSURE: 66 MMHG | HEART RATE: 75 BPM | BODY MASS INDEX: 35.61 KG/M2 | WEIGHT: 235 LBS

## 2020-07-02 DIAGNOSIS — M79.671 PAIN OF RIGHT HEEL: Primary | ICD-10-CM

## 2020-07-02 PROCEDURE — 99213 OFFICE O/P EST LOW 20 MIN: CPT | Performed by: NURSE PRACTITIONER

## 2020-07-02 NOTE — PATIENT INSTRUCTIONS
Obtain xray of right ankle  Referral to Dr. Ren with podiatry  Ibuprofen as needed  Ice and elevated as needed

## 2020-07-08 RX ORDER — LEVOTHYROXINE SODIUM 0.1 MG/1
TABLET ORAL
Qty: 90 TABLET | Refills: 3 | Status: SHIPPED | OUTPATIENT
Start: 2020-07-08 | End: 2021-01-20 | Stop reason: SDUPTHER

## 2020-12-09 NOTE — PROGRESS NOTES
"Subjective   Jimena Welch is a 50 y.o. female.     Patient is here today for annual CPE and with c/o right shoulder pain and hand pain.    Hypothyroid-  Had her thyroid removed.  Had nodules when she was 11.  She states that her thyroid \"\" so they removed it.  She is currently taking synthroid 100 mcg. She is doing well on the medication at this time.  She does not see endocrinology.      Interstitial Cystitis-  Sees urology- Abby Statin.  Takes pyridium and hydroxyzine.  Watches her diet. She states that she has had some irritation lately, but her diet hasnt been as well.     Hormone replacement- is on prasterone insert.  Sees Abby Statin.     GERD- takes 20mg omeprazole occasionally.  It is controlled fine.     Anxiety- takes 100mg zoloft daily. Mood is doing well at this time.  She had to put her dog down this week, which has been hard.     Hyperlipidemia- Takes 10mg lipitor. She is tolerating it well.  She is on her feet with work all the time, but hast been getting much additional exercise.     Sleep apnea- does not use a CPAP. She states that she has trouble sleeping.  She states she needs to get in the habit of reusing it.      IBS- takes bentyl nightly.  It helps with her symptoms. She has diarrhea often.      Back pain/ right shoulder pain- pt has been going to the chiropractor for her back.  Pain was in the mid back.  It has been improving.  She is now having pain in the right shoulder.  She has difficulty reaching behind her back.  Raising her arm above her head is painful.  It causes a shooting pain.  She does some heavy lifting.  She has not had xrays.  Arm ill go numb in certain positions.  Pain averages a 4/10 but goes up to a 10/10. She works at [employer]. She takes aleve at times.  It helps at times. Pain has been occurring for about 1 year.  She states that her work schedule does not allow her to go to PT.     Hand pain- pt reports that her hands hurt regularly.  She reports that her " distal joint have nodules.  Her hands ache all the time. They can be tender to touch.  Her left thumb is quite painful. She is concerned that she could have arthritis.  Her toes are also tender when they are touched.  Her grandmother had RA       Labs- due  Pap smear- utd- sees Abby Statin- normal  Mammogram- UTD- completed 12/9/20  DEXA-n/a  Colonoscopy- utd 2018     Vaccines:  Flu-  due  PNA- n/a  Shingles- due  Tdap- UTD     Dental exam- due  Eye exam- utd             The following portions of the patient's history were reviewed and updated as appropriate: allergies, current medications, past family history, past medical history, past social history, past surgical history and problem list.    Review of Systems   Constitutional: Negative for appetite change, chills, fatigue and fever.   HENT: Positive for postnasal drip and sore throat (off and on). Negative for congestion, ear pain, hearing loss, rhinorrhea, sinus pressure, swollen glands and trouble swallowing.    Eyes: Negative for blurred vision, double vision, pain, discharge, itching and visual disturbance.   Respiratory: Negative for chest tightness, shortness of breath and wheezing.    Cardiovascular: Negative for chest pain and palpitations.   Gastrointestinal: Positive for diarrhea. Negative for abdominal pain, blood in stool, constipation, nausea and vomiting.   Endocrine: Negative for polydipsia, polyphagia and polyuria.   Genitourinary: Positive for dysuria.   Musculoskeletal: Positive for arthralgias, back pain, joint swelling and myalgias.   Skin: Negative for rash and skin lesions.   Neurological: Positive for numbness (right arm at times). Negative for dizziness, weakness and headache.   Psychiatric/Behavioral: Positive for sleep disturbance. Negative for depressed mood and stress. The patient is not nervous/anxious.        Objective   /71 (BP Location: Right arm, Patient Position: Sitting, Cuff Size: Large Adult)   Pulse 70   Temp 96.6  °F (35.9 °C) (Tympanic)   Wt 104 kg (229 lb)   SpO2 97%   BMI 34.82 kg/m²   Physical Exam  Vitals signs reviewed.   Constitutional:       General: She is not in acute distress.     Appearance: Normal appearance. She is well-developed. She is not diaphoretic.   HENT:      Head: Normocephalic and atraumatic.      Right Ear: Tympanic membrane and ear canal normal.      Left Ear: Tympanic membrane and ear canal normal.   Eyes:      General:         Right eye: No discharge.         Left eye: No discharge.      Conjunctiva/sclera: Conjunctivae normal.      Pupils: Pupils are equal, round, and reactive to light.   Neck:      Musculoskeletal: Normal range of motion and neck supple.   Cardiovascular:      Rate and Rhythm: Normal rate and regular rhythm.      Heart sounds: No murmur.   Pulmonary:      Effort: Pulmonary effort is normal. No respiratory distress.      Breath sounds: Normal breath sounds. No wheezing or rales.   Abdominal:      General: Bowel sounds are normal. There is no distension.      Palpations: Abdomen is soft.      Tenderness: There is no abdominal tenderness.   Musculoskeletal: Normal range of motion.      Comments: Distal pinky joints have nodules and tenderness.  Pain when trying to place right arm behind back.   Skin:     General: Skin is warm and dry.   Neurological:      General: No focal deficit present.      Mental Status: She is alert and oriented to person, place, and time.   Psychiatric:         Mood and Affect: Mood normal.         Behavior: Behavior normal.         Thought Content: Thought content normal.         Judgment: Judgment normal.           Assessment/Plan     Diagnoses and all orders for this visit:    1. Need for immunization against influenza (Primary)  -     FluLaval Quad >6 Months (6933-4213)    2. Preventative health care  Comments:  work on diet and exercise  check labs  flu vaccine today  shingles vaccine ordered  Orders:  -     CBC & Differential  -     Comprehensive  Metabolic Panel; Future  -     Lipid Panel; Future  -     Hepatitis C Antibody; Future    3. Hyperlipidemia, unspecified hyperlipidemia type  Comments:  stable  cont lipitor  work on diet and exercise  check labs    4. Hypothyroidism, unspecified type  Comments:  stable  cont meds  check level  Orders:  -     TSH; Future    5. Insomnia, unspecified type  Comments:  stable  start using CPAP at night    6. Need for vaccination  -     Zoster Vac Recomb Adjuvanted (Shingrix) 50 MCG/0.5ML reconstituted suspension; Inject 0.5 mL into the appropriate muscle as directed by prescriber 1 (One) Time for 1 dose. Repeat in 2-6 months  Dispense: 1 each; Refill: 1    7. Chronic right shoulder pain  Comments:  arthritis vs rotator cuff  has been seeing chiropractor  start [facility]  referral to ortho  Orders:  -     Ambulatory Referral to Orthopedic Surgery  -     meloxicam (Mobic) 15 MG tablet; Take 1 tablet by mouth Daily.  Dispense: 30 tablet; Refill: 0    8. Pain in both hands  Comments:  possibly arthritis  check rheumatic panel  start mobic  call for update  Orders:  -     meloxicam (Mobic) 15 MG tablet; Take 1 tablet by mouth Daily.  Dispense: 30 tablet; Refill: 0  -     Rheumatoid factor; Future  -     Uric acid; Future  -     YANIRA; Future  -     Sedimentation Rate; Future    9. Anxiety disorder, unspecified type  Comments:  doing well  continue zoloft    Other orders  -     Cancel: Mammo Screening Digital Tomosynthesis Bilateral With CAD; Future

## 2020-12-10 ENCOUNTER — OFFICE VISIT (OUTPATIENT)
Dept: FAMILY MEDICINE CLINIC | Facility: CLINIC | Age: 50
End: 2020-12-10

## 2020-12-10 ENCOUNTER — LAB (OUTPATIENT)
Dept: FAMILY MEDICINE CLINIC | Facility: CLINIC | Age: 50
End: 2020-12-10

## 2020-12-10 VITALS
SYSTOLIC BLOOD PRESSURE: 114 MMHG | HEART RATE: 70 BPM | BODY MASS INDEX: 34.82 KG/M2 | OXYGEN SATURATION: 97 % | WEIGHT: 229 LBS | TEMPERATURE: 96.6 F | DIASTOLIC BLOOD PRESSURE: 71 MMHG

## 2020-12-10 DIAGNOSIS — Z23 NEED FOR VACCINATION: ICD-10-CM

## 2020-12-10 DIAGNOSIS — M79.642 PAIN IN BOTH HANDS: ICD-10-CM

## 2020-12-10 DIAGNOSIS — M79.641 PAIN IN BOTH HANDS: ICD-10-CM

## 2020-12-10 DIAGNOSIS — M25.511 CHRONIC RIGHT SHOULDER PAIN: ICD-10-CM

## 2020-12-10 DIAGNOSIS — Z00.00 PREVENTATIVE HEALTH CARE: ICD-10-CM

## 2020-12-10 DIAGNOSIS — E78.5 HYPERLIPIDEMIA, UNSPECIFIED HYPERLIPIDEMIA TYPE: ICD-10-CM

## 2020-12-10 DIAGNOSIS — E03.9 HYPOTHYROIDISM, UNSPECIFIED TYPE: ICD-10-CM

## 2020-12-10 DIAGNOSIS — G89.29 CHRONIC RIGHT SHOULDER PAIN: ICD-10-CM

## 2020-12-10 DIAGNOSIS — G47.00 INSOMNIA, UNSPECIFIED TYPE: ICD-10-CM

## 2020-12-10 DIAGNOSIS — Z23 NEED FOR IMMUNIZATION AGAINST INFLUENZA: Primary | ICD-10-CM

## 2020-12-10 DIAGNOSIS — F41.9 ANXIETY DISORDER, UNSPECIFIED TYPE: ICD-10-CM

## 2020-12-10 LAB
ALBUMIN SERPL-MCNC: 4.2 G/DL (ref 3.5–5.2)
ALBUMIN/GLOB SERPL: 1.3 G/DL
ALP SERPL-CCNC: 90 U/L (ref 39–117)
ALT SERPL W P-5'-P-CCNC: 16 U/L (ref 1–33)
ANION GAP SERPL CALCULATED.3IONS-SCNC: 6.7 MMOL/L (ref 5–15)
AST SERPL-CCNC: 15 U/L (ref 1–32)
BASOPHILS # BLD AUTO: 0.06 10*3/MM3 (ref 0–0.2)
BASOPHILS NFR BLD AUTO: 1 % (ref 0–1.5)
BILIRUB SERPL-MCNC: 0.2 MG/DL (ref 0–1.2)
BUN SERPL-MCNC: 16 MG/DL (ref 6–20)
BUN/CREAT SERPL: 17.6 (ref 7–25)
CALCIUM SPEC-SCNC: 9.7 MG/DL (ref 8.6–10.5)
CHLORIDE SERPL-SCNC: 104 MMOL/L (ref 98–107)
CHOLEST SERPL-MCNC: 198 MG/DL (ref 0–200)
CHROMATIN AB SERPL-ACNC: <10 IU/ML (ref 0–14)
CO2 SERPL-SCNC: 28.3 MMOL/L (ref 22–29)
CREAT SERPL-MCNC: 0.91 MG/DL (ref 0.57–1)
DEPRECATED RDW RBC AUTO: 43.6 FL (ref 37–54)
EOSINOPHIL # BLD AUTO: 0.01 10*3/MM3 (ref 0–0.4)
EOSINOPHIL NFR BLD AUTO: 0.2 % (ref 0.3–6.2)
ERYTHROCYTE [DISTWIDTH] IN BLOOD BY AUTOMATED COUNT: 13.4 % (ref 12.3–15.4)
ERYTHROCYTE [SEDIMENTATION RATE] IN BLOOD: 11 MM/HR (ref 0–30)
GFR SERPL CREATININE-BSD FRML MDRD: 65 ML/MIN/1.73
GLOBULIN UR ELPH-MCNC: 3.3 GM/DL
GLUCOSE SERPL-MCNC: 85 MG/DL (ref 65–99)
HCT VFR BLD AUTO: 43.8 % (ref 34–46.6)
HCV AB SER DONR QL: NORMAL
HDLC SERPL-MCNC: 56 MG/DL (ref 40–60)
HGB BLD-MCNC: 14.4 G/DL (ref 12–15.9)
IMM GRANULOCYTES # BLD AUTO: 0.01 10*3/MM3 (ref 0–0.05)
IMM GRANULOCYTES NFR BLD AUTO: 0.2 % (ref 0–0.5)
LDLC SERPL CALC-MCNC: 123 MG/DL (ref 0–100)
LDLC/HDLC SERPL: 2.15 {RATIO}
LYMPHOCYTES # BLD AUTO: 1.99 10*3/MM3 (ref 0.7–3.1)
LYMPHOCYTES NFR BLD AUTO: 33.1 % (ref 19.6–45.3)
MCH RBC QN AUTO: 29.1 PG (ref 26.6–33)
MCHC RBC AUTO-ENTMCNC: 32.9 G/DL (ref 31.5–35.7)
MCV RBC AUTO: 88.7 FL (ref 79–97)
MONOCYTES # BLD AUTO: 0.39 10*3/MM3 (ref 0.1–0.9)
MONOCYTES NFR BLD AUTO: 6.5 % (ref 5–12)
NEUTROPHILS NFR BLD AUTO: 3.55 10*3/MM3 (ref 1.7–7)
NEUTROPHILS NFR BLD AUTO: 59 % (ref 42.7–76)
NRBC BLD AUTO-RTO: 0 /100 WBC (ref 0–0.2)
PLATELET # BLD AUTO: 233 10*3/MM3 (ref 140–450)
PMV BLD AUTO: 9.9 FL (ref 6–12)
POTASSIUM SERPL-SCNC: 4.1 MMOL/L (ref 3.5–5.2)
PROT SERPL-MCNC: 7.5 G/DL (ref 6–8.5)
RBC # BLD AUTO: 4.94 10*6/MM3 (ref 3.77–5.28)
SODIUM SERPL-SCNC: 139 MMOL/L (ref 136–145)
TRIGL SERPL-MCNC: 107 MG/DL (ref 0–150)
TSH SERPL DL<=0.05 MIU/L-ACNC: 1.29 UIU/ML (ref 0.27–4.2)
URATE SERPL-MCNC: 3.6 MG/DL (ref 2.4–5.7)
VLDLC SERPL-MCNC: 19 MG/DL (ref 5–40)
WBC # BLD AUTO: 6.01 10*3/MM3 (ref 3.4–10.8)

## 2020-12-10 PROCEDURE — 99214 OFFICE O/P EST MOD 30 MIN: CPT | Performed by: NURSE PRACTITIONER

## 2020-12-10 PROCEDURE — 84550 ASSAY OF BLOOD/URIC ACID: CPT | Performed by: NURSE PRACTITIONER

## 2020-12-10 PROCEDURE — 86803 HEPATITIS C AB TEST: CPT | Performed by: NURSE PRACTITIONER

## 2020-12-10 PROCEDURE — 84443 ASSAY THYROID STIM HORMONE: CPT | Performed by: NURSE PRACTITIONER

## 2020-12-10 PROCEDURE — 80061 LIPID PANEL: CPT | Performed by: NURSE PRACTITIONER

## 2020-12-10 PROCEDURE — 99396 PREV VISIT EST AGE 40-64: CPT | Performed by: NURSE PRACTITIONER

## 2020-12-10 PROCEDURE — 86038 ANTINUCLEAR ANTIBODIES: CPT | Performed by: NURSE PRACTITIONER

## 2020-12-10 PROCEDURE — 85652 RBC SED RATE AUTOMATED: CPT | Performed by: NURSE PRACTITIONER

## 2020-12-10 PROCEDURE — 80053 COMPREHEN METABOLIC PANEL: CPT | Performed by: NURSE PRACTITIONER

## 2020-12-10 PROCEDURE — 36415 COLL VENOUS BLD VENIPUNCTURE: CPT

## 2020-12-10 PROCEDURE — 90471 IMMUNIZATION ADMIN: CPT | Performed by: NURSE PRACTITIONER

## 2020-12-10 PROCEDURE — 90686 IIV4 VACC NO PRSV 0.5 ML IM: CPT | Performed by: NURSE PRACTITIONER

## 2020-12-10 PROCEDURE — 86431 RHEUMATOID FACTOR QUANT: CPT | Performed by: NURSE PRACTITIONER

## 2020-12-10 PROCEDURE — 85025 COMPLETE CBC W/AUTO DIFF WBC: CPT | Performed by: NURSE PRACTITIONER

## 2020-12-10 RX ORDER — ZOSTER VACCINE RECOMBINANT, ADJUVANTED 50 MCG/0.5
0.5 KIT INTRAMUSCULAR ONCE
Qty: 1 EACH | Refills: 1 | Status: SHIPPED | OUTPATIENT
Start: 2020-12-10 | End: 2020-12-10

## 2020-12-10 RX ORDER — ESTRADIOL 0.75 MG/.75G
1 GEL TOPICAL DAILY
COMMUNITY
Start: 2020-10-28 | End: 2022-05-18 | Stop reason: SDUPTHER

## 2020-12-10 RX ORDER — HYDROXYZINE HYDROCHLORIDE 10 MG/1
TABLET, FILM COATED ORAL
COMMUNITY
Start: 2020-12-08 | End: 2022-06-30

## 2020-12-10 RX ORDER — MELOXICAM 15 MG/1
15 TABLET ORAL DAILY
Qty: 30 TABLET | Refills: 0 | Status: SHIPPED | OUTPATIENT
Start: 2020-12-10 | End: 2021-01-08

## 2020-12-10 NOTE — PATIENT INSTRUCTIONS
Start taking meloxicam daily  No other NSAIDs while on this med  Complete blood work  Flu vaccine today  shingrix ordered to pharmacy  Referral to Dr. Justin for shoulder  Call for any issues or concerns

## 2020-12-11 LAB — ANA SER QL: NEGATIVE

## 2020-12-16 ENCOUNTER — TELEPHONE (OUTPATIENT)
Dept: ORTHOPEDIC SURGERY | Facility: CLINIC | Age: 50
End: 2020-12-16

## 2020-12-23 ENCOUNTER — OFFICE VISIT (OUTPATIENT)
Dept: ORTHOPEDIC SURGERY | Facility: CLINIC | Age: 50
End: 2020-12-23

## 2020-12-23 VITALS
HEIGHT: 68 IN | SYSTOLIC BLOOD PRESSURE: 134 MMHG | WEIGHT: 229 LBS | HEART RATE: 79 BPM | DIASTOLIC BLOOD PRESSURE: 64 MMHG | BODY MASS INDEX: 34.71 KG/M2

## 2020-12-23 DIAGNOSIS — M75.01 ADHESIVE CAPSULITIS OF RIGHT SHOULDER: ICD-10-CM

## 2020-12-23 DIAGNOSIS — M25.511 RIGHT SHOULDER PAIN, UNSPECIFIED CHRONICITY: Primary | ICD-10-CM

## 2020-12-23 PROCEDURE — 99243 OFF/OP CNSLTJ NEW/EST LOW 30: CPT | Performed by: ORTHOPAEDIC SURGERY

## 2020-12-23 PROCEDURE — 20610 DRAIN/INJ JOINT/BURSA W/O US: CPT | Performed by: ORTHOPAEDIC SURGERY

## 2020-12-23 RX ORDER — TRIAMCINOLONE ACETONIDE 40 MG/ML
80 INJECTION, SUSPENSION INTRA-ARTICULAR; INTRAMUSCULAR ONCE
Status: COMPLETED | OUTPATIENT
Start: 2020-12-23 | End: 2020-12-23

## 2020-12-23 RX ADMIN — TRIAMCINOLONE ACETONIDE 80 MG: 40 INJECTION, SUSPENSION INTRA-ARTICULAR; INTRAMUSCULAR at 09:27

## 2020-12-23 NOTE — PROGRESS NOTES
"     Patient ID: Jimena Welch is a 50 y.o. female.    Chief Complaint:    Chief Complaint   Patient presents with   • Right Shoulder - Initial Evaluation, Pain       HPI:  Jimena is a 50-year-old female here in consultation from Carolin Calero for about a year of right shoulder pain. There is no injury. She has been in treatment for frozen shoulder on the left side and this feels somewhat similar but also different because she has a little bit of numbness in the right hand. Pain is dull and a 6/10 deep in the shoulder with some referral down the arm. It is worse at night. She has been doing some stretching but otherwise no treatment to date  Past Medical History:   Diagnosis Date   • Anxiety    • Asthma    • Hyperlipidemia    • Obesity        Past Surgical History:   Procedure Laterality Date   • BLADDER SURGERY     • HYSTERECTOMY     • NASAL SEPTUM SURGERY      2019   • THYROIDECTOMY     • WISDOM TOOTH EXTRACTION         Family History   Problem Relation Age of Onset   • Heart disease Father           Social History     Occupational History   • Not on file   Tobacco Use   • Smoking status: Never Smoker   • Smokeless tobacco: Never Used   Substance and Sexual Activity   • Alcohol use: No     Frequency: Never   • Drug use: No   • Sexual activity: Defer      Review of Systems   Cardiovascular: Negative for chest pain.   Musculoskeletal: Positive for arthralgias.       Objective:    /64   Pulse 79   Ht 172.7 cm (68\")   Wt 104 kg (229 lb)   BMI 34.82 kg/m²     Physical Examination:  She is a pleasant female in no distress. She is alert and oriented x3 and appears her stated age.  Right shoulder demonstrates no scars and no atrophy. She has mild paraspinal pain and mildly reduced cervical range of motion. Passive shoulder elevation 150 degrees abduction 110 degrees external rotation 20 degrees internal rotation the right hip. She has mild pain but no weakness on Speed, [place], supraspinatus testing. Belly " press and liftoff are 5/5.Sensory and motor exam are intact all distributions. Radial pulse is palpable and capillary refill is less than two seconds to all digits    Imaging:  right Shoulder X-Ray  Indication: Right shoulder pain  AP Y and Lateral views  Findings: Well-maintained joint spaces without significant impingement  no bony lesion  Soft tissues normal  normal joint spaces  Hardware appropriately positioned not applicable      no prior studies available for comparison    Assessment:  Adhesive capsulitis right shoulder    Plan:  Treatment options discussed,I recommend injection after today's evaluation. Risks and benefits of the injection were discussed. Under sterile technique and written consent I injected 80mg of Kenalog and 2cc of 1% Lidocaine in the shoulder and subacromial space. It was well tolerated      Procedures         Disclaimer: Please note that areas of this note were completed with computer voice recognition software.  Quite often unanticipated grammatical, syntax, homophones, and other interpretive errors are inadvertently transcribed by the computer software. Please excuse any errors that have escaped final proofreading.

## 2021-01-08 DIAGNOSIS — M79.642 PAIN IN BOTH HANDS: ICD-10-CM

## 2021-01-08 DIAGNOSIS — M79.641 PAIN IN BOTH HANDS: ICD-10-CM

## 2021-01-08 DIAGNOSIS — M25.511 CHRONIC RIGHT SHOULDER PAIN: ICD-10-CM

## 2021-01-08 DIAGNOSIS — G89.29 CHRONIC RIGHT SHOULDER PAIN: ICD-10-CM

## 2021-01-08 RX ORDER — MELOXICAM 15 MG/1
TABLET ORAL
Qty: 30 TABLET | Refills: 0 | Status: SHIPPED | OUTPATIENT
Start: 2021-01-08 | End: 2021-02-15

## 2021-01-20 RX ORDER — SERTRALINE HYDROCHLORIDE 100 MG/1
100 TABLET, FILM COATED ORAL DAILY
Qty: 90 TABLET | Refills: 4 | Status: SHIPPED | OUTPATIENT
Start: 2021-01-20 | End: 2022-02-08

## 2021-01-20 RX ORDER — LEVOTHYROXINE SODIUM 0.1 MG/1
100 TABLET ORAL DAILY
Qty: 90 TABLET | Refills: 3 | Status: SHIPPED | OUTPATIENT
Start: 2021-01-20 | End: 2022-01-17

## 2021-01-20 RX ORDER — ATORVASTATIN CALCIUM 10 MG/1
10 TABLET, FILM COATED ORAL DAILY
Qty: 90 TABLET | Refills: 4 | Status: SHIPPED | OUTPATIENT
Start: 2021-01-20 | End: 2022-02-08

## 2021-02-12 ENCOUNTER — ON CAMPUS - OUTPATIENT (AMBULATORY)
Dept: URBAN - METROPOLITAN AREA HOSPITAL 2 | Facility: HOSPITAL | Age: 51
End: 2021-02-12

## 2021-02-13 DIAGNOSIS — M79.641 PAIN IN BOTH HANDS: ICD-10-CM

## 2021-02-13 DIAGNOSIS — M79.642 PAIN IN BOTH HANDS: ICD-10-CM

## 2021-02-13 DIAGNOSIS — G89.29 CHRONIC RIGHT SHOULDER PAIN: ICD-10-CM

## 2021-02-13 DIAGNOSIS — M25.511 CHRONIC RIGHT SHOULDER PAIN: ICD-10-CM

## 2021-02-15 RX ORDER — MELOXICAM 15 MG/1
TABLET ORAL
Qty: 30 TABLET | Refills: 0 | Status: SHIPPED | OUTPATIENT
Start: 2021-02-15 | End: 2022-06-30

## 2021-09-07 ENCOUNTER — HOSPITAL ENCOUNTER (EMERGENCY)
Facility: HOSPITAL | Age: 51
Discharge: HOME | End: 2021-09-07
Attending: EMERGENCY MEDICINE | Admitting: EMERGENCY MEDICINE

## 2021-09-07 VITALS
DIASTOLIC BLOOD PRESSURE: 65 MMHG | HEART RATE: 75 BPM | OXYGEN SATURATION: 97 % | HEIGHT: 68 IN | SYSTOLIC BLOOD PRESSURE: 124 MMHG | TEMPERATURE: 98.4 F | BODY MASS INDEX: 34.1 KG/M2 | RESPIRATION RATE: 15 BRPM | WEIGHT: 225 LBS

## 2021-09-07 DIAGNOSIS — Z20.822 LAB TEST NEGATIVE FOR COVID-19 VIRUS: ICD-10-CM

## 2021-09-07 DIAGNOSIS — K52.9 GASTROENTERITIS: Primary | ICD-10-CM

## 2021-09-07 LAB
ALBUMIN SERPL-MCNC: 4 G/DL (ref 3.5–5.2)
ALBUMIN/GLOB SERPL: 1.3 G/DL
ALP SERPL-CCNC: 94 U/L (ref 39–117)
ALT SERPL W P-5'-P-CCNC: 15 U/L (ref 1–33)
ANION GAP SERPL CALCULATED.3IONS-SCNC: 11 MMOL/L (ref 5–15)
AST SERPL-CCNC: 14 U/L (ref 1–32)
BACTERIA UR QL AUTO: ABNORMAL /HPF
BASOPHILS # BLD AUTO: 0.1 10*3/MM3 (ref 0–0.2)
BASOPHILS NFR BLD AUTO: 0.4 % (ref 0–1.5)
BILIRUB SERPL-MCNC: 0.3 MG/DL (ref 0–1.2)
BILIRUB UR QL STRIP: NEGATIVE
BUN SERPL-MCNC: 19 MG/DL (ref 6–20)
BUN/CREAT SERPL: 25 (ref 7–25)
CALCIUM SPEC-SCNC: 8.9 MG/DL (ref 8.6–10.5)
CHLORIDE SERPL-SCNC: 106 MMOL/L (ref 98–107)
CLARITY UR: ABNORMAL
CO2 SERPL-SCNC: 23 MMOL/L (ref 22–29)
COLOR UR: YELLOW
CREAT SERPL-MCNC: 0.76 MG/DL (ref 0.57–1)
DEPRECATED RDW RBC AUTO: 40.7 FL (ref 37–54)
EOSINOPHIL # BLD AUTO: 0 10*3/MM3 (ref 0–0.4)
EOSINOPHIL NFR BLD AUTO: 0.1 % (ref 0.3–6.2)
ERYTHROCYTE [DISTWIDTH] IN BLOOD BY AUTOMATED COUNT: 13.5 % (ref 12.3–15.4)
GFR SERPL CREATININE-BSD FRML MDRD: 80 ML/MIN/1.73
GLOBULIN UR ELPH-MCNC: 3.1 GM/DL
GLUCOSE SERPL-MCNC: 116 MG/DL (ref 65–99)
GLUCOSE UR STRIP-MCNC: NEGATIVE MG/DL
HCT VFR BLD AUTO: 41.4 % (ref 34–46.6)
HGB BLD-MCNC: 14 G/DL (ref 12–15.9)
HGB UR QL STRIP.AUTO: ABNORMAL
HOLD SPECIMEN: NORMAL
HYALINE CASTS UR QL AUTO: ABNORMAL /LPF
KETONES UR QL STRIP: ABNORMAL
LEUKOCYTE ESTERASE UR QL STRIP.AUTO: ABNORMAL
LIPASE SERPL-CCNC: 33 U/L (ref 13–60)
LYMPHOCYTES # BLD AUTO: 0.9 10*3/MM3 (ref 0.7–3.1)
LYMPHOCYTES NFR BLD AUTO: 7.2 % (ref 19.6–45.3)
MCH RBC QN AUTO: 28.8 PG (ref 26.6–33)
MCHC RBC AUTO-ENTMCNC: 33.8 G/DL (ref 31.5–35.7)
MCV RBC AUTO: 85.2 FL (ref 79–97)
MONOCYTES # BLD AUTO: 0.7 10*3/MM3 (ref 0.1–0.9)
MONOCYTES NFR BLD AUTO: 5.1 % (ref 5–12)
NEUTROPHILS NFR BLD AUTO: 11.3 10*3/MM3 (ref 1.7–7)
NEUTROPHILS NFR BLD AUTO: 87.2 % (ref 42.7–76)
NITRITE UR QL STRIP: NEGATIVE
NRBC BLD AUTO-RTO: 0 /100 WBC (ref 0–0.2)
PH UR STRIP.AUTO: 6 [PH] (ref 5–8)
PLATELET # BLD AUTO: 244 10*3/MM3 (ref 140–450)
PMV BLD AUTO: 7.3 FL (ref 6–12)
POTASSIUM SERPL-SCNC: 3.7 MMOL/L (ref 3.5–5.2)
PROT SERPL-MCNC: 7.1 G/DL (ref 6–8.5)
PROT UR QL STRIP: NEGATIVE
RBC # BLD AUTO: 4.86 10*6/MM3 (ref 3.77–5.28)
RBC # UR: ABNORMAL /HPF
REF LAB TEST METHOD: ABNORMAL
SARS-COV-2 RNA PNL SPEC NAA+PROBE: NOT DETECTED
SODIUM SERPL-SCNC: 140 MMOL/L (ref 136–145)
SP GR UR STRIP: >=1.03 (ref 1–1.03)
SQUAMOUS #/AREA URNS HPF: ABNORMAL /HPF
UROBILINOGEN UR QL STRIP: ABNORMAL
WBC # BLD AUTO: 13 10*3/MM3 (ref 3.4–10.8)
WBC UR QL AUTO: ABNORMAL /HPF

## 2021-09-07 PROCEDURE — 96375 TX/PRO/DX INJ NEW DRUG ADDON: CPT

## 2021-09-07 PROCEDURE — 87086 URINE CULTURE/COLONY COUNT: CPT | Performed by: EMERGENCY MEDICINE

## 2021-09-07 PROCEDURE — 80053 COMPREHEN METABOLIC PANEL: CPT | Performed by: EMERGENCY MEDICINE

## 2021-09-07 PROCEDURE — 99283 EMERGENCY DEPT VISIT LOW MDM: CPT

## 2021-09-07 PROCEDURE — 81001 URINALYSIS AUTO W/SCOPE: CPT | Performed by: EMERGENCY MEDICINE

## 2021-09-07 PROCEDURE — 25010000002 ONDANSETRON PER 1 MG: Performed by: EMERGENCY MEDICINE

## 2021-09-07 PROCEDURE — 96374 THER/PROPH/DIAG INJ IV PUSH: CPT

## 2021-09-07 PROCEDURE — 85025 COMPLETE CBC W/AUTO DIFF WBC: CPT | Performed by: EMERGENCY MEDICINE

## 2021-09-07 PROCEDURE — 83690 ASSAY OF LIPASE: CPT | Performed by: EMERGENCY MEDICINE

## 2021-09-07 PROCEDURE — 87635 SARS-COV-2 COVID-19 AMP PRB: CPT | Performed by: EMERGENCY MEDICINE

## 2021-09-07 RX ORDER — ONDANSETRON 2 MG/ML
4 INJECTION INTRAMUSCULAR; INTRAVENOUS ONCE
Status: COMPLETED | OUTPATIENT
Start: 2021-09-07 | End: 2021-09-07

## 2021-09-07 RX ORDER — ONDANSETRON 4 MG/1
4 TABLET, ORALLY DISINTEGRATING ORAL EVERY 8 HOURS PRN
Qty: 12 TABLET | Refills: 0 | Status: SHIPPED | OUTPATIENT
Start: 2021-09-07 | End: 2022-06-30

## 2021-09-07 RX ADMIN — ONDANSETRON 4 MG: 2 INJECTION INTRAMUSCULAR; INTRAVENOUS at 03:54

## 2021-09-07 RX ADMIN — FAMOTIDINE 20 MG: 10 INJECTION INTRAVENOUS at 03:54

## 2021-09-07 RX ADMIN — SODIUM CHLORIDE 1000 ML: 9 INJECTION, SOLUTION INTRAVENOUS at 03:53

## 2021-09-07 NOTE — ED PROVIDER NOTES
Subjective   51-year-old female with symptoms she thinks is food poisoning qualified as moderate nonbloody vomiting and diarrhea with associated diffuse abdominal cramps onset several hours prior to arrival, no fever.  Worse with p.o. intake.          Review of Systems   Gastrointestinal: Positive for abdominal pain, diarrhea, nausea and vomiting.   All other systems reviewed and are negative.      Past Medical History:   Diagnosis Date   • Anxiety    • Asthma    • Hyperlipidemia    • Obesity        No Known Allergies    Past Surgical History:   Procedure Laterality Date   • BLADDER SURGERY     • HYSTERECTOMY     • NASAL SEPTUM SURGERY      2019   • THYROIDECTOMY     • WISDOM TOOTH EXTRACTION         Family History   Problem Relation Age of Onset   • Heart disease Father        Social History     Socioeconomic History   • Marital status:      Spouse name: Not on file   • Number of children: Not on file   • Years of education: Not on file   • Highest education level: Not on file   Tobacco Use   • Smoking status: Never Smoker   • Smokeless tobacco: Never Used   Substance and Sexual Activity   • Alcohol use: No   • Drug use: No   • Sexual activity: Defer           Objective   Physical Exam  Constitutional:       Appearance: Normal appearance.   HENT:      Head: Normocephalic and atraumatic.      Mouth/Throat:      Mouth: Mucous membranes are moist.      Pharynx: Oropharynx is clear.   Eyes:      Conjunctiva/sclera: Conjunctivae normal.      Pupils: Pupils are equal, round, and reactive to light.   Cardiovascular:      Rate and Rhythm: Normal rate and regular rhythm.      Heart sounds: Normal heart sounds.   Pulmonary:      Effort: Pulmonary effort is normal.      Breath sounds: Normal breath sounds.   Abdominal:      General: Bowel sounds are normal. There is no distension.      Palpations: Abdomen is soft.      Tenderness: There is no abdominal tenderness.   Musculoskeletal:         General: Normal range of  motion.   Skin:     General: Skin is warm and dry.      Capillary Refill: Capillary refill takes less than 2 seconds.   Neurological:      General: No focal deficit present.      Mental Status: She is alert and oriented to person, place, and time.   Psychiatric:         Mood and Affect: Mood normal.         Behavior: Behavior normal.         Procedures           ED Course                                           MDM  Number of Diagnoses or Management Options  Gastroenteritis  Lab test negative for COVID-19 virus  Diagnosis management comments: Results for orders placed or performed during the hospital encounter of 09/07/21  -COVID-19,CEPHEID/RANDY/BDMAX,COR/LEILANI/PAD/SONIYA IN-HOUSE(OR EMERGENT/ADD-ON),NP SWAB IN TRANSPORT MEDIA 3-4 HR TAT, RT-PCR - Swab, Nasopharynx  Specimen: Nasopharynx; Swab       Result                      Value             Ref Range           COVID19                     Not Detected      Not Detected*  -Comprehensive Metabolic Panel  Specimen: Blood       Result                      Value             Ref Range           Glucose                     116 (H)           65 - 99 mg/dL       BUN                         19                6 - 20 mg/dL        Creatinine                  0.76              0.57 - 1.00 *       Sodium                      140               136 - 145 mm*       Potassium                   3.7               3.5 - 5.2 mm*       Chloride                    106               98 - 107 mmo*       CO2                         23.0              22.0 - 29.0 *       Calcium                     8.9               8.6 - 10.5 m*       Total Protein               7.1               6.0 - 8.5 g/*       Albumin                     4.00              3.50 - 5.20 *       ALT (SGPT)                  15                1 - 33 U/L          AST (SGOT)                  14                1 - 32 U/L          Alkaline Phosphatase        94                39 - 117 U/L        Total Bilirubin             0.3                0.0 - 1.2 mg*       eGFR Non  Amer       80                >60 mL/min/1*       Globulin                    3.1               gm/dL               A/G Ratio                   1.3               g/dL                BUN/Creatinine Ratio        25.0              7.0 - 25.0          Anion Gap                   11.0              5.0 - 15.0 m*  -Lipase  Specimen: Blood       Result                      Value             Ref Range           Lipase                      33                13 - 60 U/L    -Urinalysis With Culture If Indicated - Urine, Clean Catch  Specimen: Urine, Clean Catch       Result                      Value             Ref Range           Color, UA                   Yellow            Yellow, Straw       Appearance, UA              Cloudy (A)        Clear               pH, UA                      6.0               5.0 - 8.0           Specific Gravity, UA        >=1.030           1.005 - 1.030       Glucose, UA                 Negative          Negative            Ketones, UA                 Trace (A)         Negative            Bilirubin, UA               Negative          Negative            Blood, UA                                     Negative        Moderate (2+) (A)       Protein, UA                 Negative          Negative            Leuk Esterase, UA                             Negative        Moderate (2+) (A)       Nitrite, UA                 Negative          Negative            Urobilinogen, UA            0.2 E.U./dL       0.2 - 1.0 E.*  -CBC Auto Differential  Specimen: Blood       Result                      Value             Ref Range           WBC                         13.00 (H)         3.40 - 10.80*       RBC                         4.86              3.77 - 5.28 *       Hemoglobin                  14.0              12.0 - 15.9 *       Hematocrit                  41.4              34.0 - 46.6 %       MCV                         85.2              79.0 - 97.0 *       MCH                          28.8              26.6 - 33.0 *       MCHC                        33.8              31.5 - 35.7 *       RDW                         13.5              12.3 - 15.4 %       RDW-SD                      40.7              37.0 - 54.0 *       MPV                         7.3               6.0 - 12.0 fL       Platelets                   244               140 - 450 10*       Neutrophil %                87.2 (H)          42.7 - 76.0 %       Lymphocyte %                7.2 (L)           19.6 - 45.3 %       Monocyte %                  5.1               5.0 - 12.0 %        Eosinophil %                0.1 (L)           0.3 - 6.2 %         Basophil %                  0.4               0.0 - 1.5 %         Neutrophils, Absolute       11.30 (H)         1.70 - 7.00 *       Lymphocytes, Absolute       0.90              0.70 - 3.10 *       Monocytes, Absolute         0.70              0.10 - 0.90 *       Eosinophils, Absolute       0.00              0.00 - 0.40 *       Basophils, Absolute         0.10              0.00 - 0.20 *       nRBC                        0.0               0.0 - 0.2 /1*  -Urinalysis, Microscopic Only - Urine, Clean Catch  Specimen: Urine, Clean Catch       Result                      Value             Ref Range           RBC, UA                     6-12 (A)          None Seen /H*       WBC, UA                     13-20 (A)         None Seen /H*       Bacteria, UA                None Seen         None Seen /H*       Squamous Epithelial Ce*     3-6 (A)           None Seen, 0*       Hyaline Casts, UA           None Seen         None Seen /L*       Methodology                                                   Manual Light Microscopy  -Gold Top - SST       Result                      Value             Ref Range           Extra Tube                                                    Hold for add-ons.    Patient feels better, no abdominal pain, appendicitis precautions reviewed.  Clinically with  gastroenteritis.       Amount and/or Complexity of Data Reviewed  Clinical lab tests: reviewed and ordered  Tests in the radiology section of CPT®: ordered and reviewed  Tests in the medicine section of CPT®: ordered and reviewed        Final diagnoses:   Gastroenteritis   Lab test negative for COVID-19 virus       ED Disposition  ED Disposition     ED Disposition Condition Comment    Discharge Stable           Carolin Calero, APRN  [address]  [address]  [place] [address] [phone]      As needed         Medication List      New Prescriptions    ondansetron ODT 4 MG disintegrating tablet  Commonly known as: ZOFRAN-ODT  Place 1 tablet on the tongue Every 8 (Eight) Hours As Needed for Nausea.           Where to Get Your Medications      These medications were sent to [facility] - [facility] [place] - [address] [facility] - [phone]  - [phone] FX  [address] [facility] [place] [address]    Phone: [phone]   · ondansetron ODT 4 MG disintegrating tablet          Dequan Kern MD  09/07/21 0658

## 2021-09-08 LAB — BACTERIA SPEC AEROBE CULT: NORMAL

## 2022-01-17 RX ORDER — LEVOTHYROXINE SODIUM 0.1 MG/1
TABLET ORAL
Qty: 30 TABLET | Refills: 0 | Status: SHIPPED | OUTPATIENT
Start: 2022-01-17 | End: 2022-01-31

## 2022-01-31 RX ORDER — LEVOTHYROXINE SODIUM 0.1 MG/1
100 TABLET ORAL DAILY
Qty: 30 TABLET | Refills: 0 | Status: SHIPPED | OUTPATIENT
Start: 2022-01-31 | End: 2022-02-23 | Stop reason: SDUPTHER

## 2022-02-08 RX ORDER — SERTRALINE HYDROCHLORIDE 100 MG/1
TABLET, FILM COATED ORAL
Qty: 90 TABLET | Refills: 4 | Status: SHIPPED | OUTPATIENT
Start: 2022-02-08 | End: 2023-02-27

## 2022-02-08 RX ORDER — ATORVASTATIN CALCIUM 10 MG/1
TABLET, FILM COATED ORAL
Qty: 90 TABLET | Refills: 4 | Status: SHIPPED | OUTPATIENT
Start: 2022-02-08 | End: 2023-02-27

## 2022-02-17 RX ORDER — LEVOTHYROXINE SODIUM 0.1 MG/1
100 TABLET ORAL DAILY
Qty: 30 TABLET | Refills: 0 | OUTPATIENT
Start: 2022-02-17

## 2022-02-23 RX ORDER — LEVOTHYROXINE SODIUM 0.1 MG/1
100 TABLET ORAL DAILY
Qty: 30 TABLET | Refills: 0 | Status: SHIPPED | OUTPATIENT
Start: 2022-02-23 | End: 2022-05-18

## 2022-03-08 RX ORDER — LEVOTHYROXINE SODIUM 0.1 MG/1
TABLET ORAL
Qty: 30 TABLET | Refills: 0 | OUTPATIENT
Start: 2022-03-08

## 2022-04-05 RX ORDER — LEVOTHYROXINE SODIUM 0.1 MG/1
TABLET ORAL
Qty: 30 TABLET | Refills: 0 | OUTPATIENT
Start: 2022-04-05

## 2022-05-02 ENCOUNTER — HOSPITAL ENCOUNTER (EMERGENCY)
Facility: HOSPITAL | Age: 52
Discharge: HOME | End: 2022-05-02
Attending: EMERGENCY MEDICINE | Admitting: EMERGENCY MEDICINE

## 2022-05-02 ENCOUNTER — APPOINTMENT (OUTPATIENT)
Dept: GENERAL RADIOLOGY | Facility: HOSPITAL | Age: 52
End: 2022-05-02

## 2022-05-02 DIAGNOSIS — S60.212A CONTUSION OF LEFT WRIST, INITIAL ENCOUNTER: Primary | ICD-10-CM

## 2022-05-02 PROCEDURE — 73110 X-RAY EXAM OF WRIST: CPT

## 2022-05-02 PROCEDURE — 99283 EMERGENCY DEPT VISIT LOW MDM: CPT

## 2022-05-02 RX ORDER — TRAMADOL HYDROCHLORIDE 50 MG/1
50 TABLET ORAL EVERY 6 HOURS PRN
Qty: 12 TABLET | Refills: 0 | Status: SHIPPED | OUTPATIENT
Start: 2022-05-02 | End: 2022-06-30

## 2022-05-03 VITALS
OXYGEN SATURATION: 96 % | HEIGHT: 68 IN | RESPIRATION RATE: 15 BRPM | TEMPERATURE: 98 F | DIASTOLIC BLOOD PRESSURE: 67 MMHG | SYSTOLIC BLOOD PRESSURE: 126 MMHG | WEIGHT: 230 LBS | HEART RATE: 73 BPM | BODY MASS INDEX: 34.86 KG/M2

## 2022-05-03 NOTE — ED PROVIDER NOTES
Subjective   52-year-old female slipped and fell while working with plants.  She fell on an outstretched hand.  She reports pain and swelling in the ulnar aspect of the wrist.  She reports no decrease in sensation or circulation.  The patient denies other injuries          Review of Systems   Musculoskeletal: Positive for arthralgias and joint swelling. Negative for myalgias, neck pain and neck stiffness.   Neurological: Negative for numbness.       Past Medical History:   Diagnosis Date   • Anxiety    • Asthma    • Hyperlipidemia    • Obesity        No Known Allergies    Past Surgical History:   Procedure Laterality Date   • BLADDER SURGERY     • HYSTERECTOMY     • NASAL SEPTUM SURGERY      2019   • THYROIDECTOMY     • WISDOM TOOTH EXTRACTION         Family History   Problem Relation Age of Onset   • Heart disease Father        Social History     Socioeconomic History   • Marital status:    Tobacco Use   • Smoking status: Never Smoker   • Smokeless tobacco: Never Used   Substance and Sexual Activity   • Alcohol use: No   • Drug use: No   • Sexual activity: Defer           Objective   Physical Exam  Nontoxic Tamela Coma Scale 15  Left upper extremity (right-hand-dominant): The patient is neurovascular intact with normal cap refill no for range of motion intact there is no increase in pain with axial loading on the thumb there is no snuffbox tenderness.  There is increase in pain with pronation supination of forearm on the ulnar aspect of the wrist some increase in pain with ulnar deviation.  No pain over the radial head no elbow effusion  Procedures       Wrist splint applied neurovascular intact afterwards    ED Course               Labs Reviewed - No data to display  Medications - No data to display  XR Wrist 3+ View Left    Result Date: 5/2/2022  No fractures or dislocations. If continued wrist pain or snuffbox tenderness, consider a follow-up wrist radiograph with dedicated scaphoid view in 10-14 days.   Electronically Signed By-Teresa Garza MD On:5/2/2022 9:42 PM This report was finalized on [id number] by  Teresa Garza MD.                                       MDM  Number of Diagnoses or Management Options  Risk of Complications, Morbidity, and/or Mortality  Presenting problems: high  Diagnostic procedures: high  Management options: high  General comments: Patient was given a prescription for diclofenac 50 mg twice daily for the next 5 days she was also given prescription for tramadol she was encouraged to continue splint for 5 to 7 days she was advised to repeat x-ray of the wrist for pain greater than 5 days she was stable at discharge and vocalized understanding of discharge instructions and warning        Final diagnoses:   Contusion of left wrist, initial encounter       ED Disposition  ED Disposition     ED Disposition   Discharge    Condition   Stable    Comment   --             No follow-up provider specified.       Medication List      New Prescriptions    diclofenac 50 MG EC tablet  Commonly known as: VOLTAREN  Take 1 tablet by mouth 2 (Two) Times a Day.     traMADol 50 MG tablet  Commonly known as: ULTRAM  Take 1 tablet by mouth Every 6 (Six) Hours As Needed for Moderate Pain  or Severe Pain .           Where to Get Your Medications      These medications were sent to [facility] [place] - [address] [facility] - [phone]  - [phone] FX  [address] [facility] [place] [address]    Phone: [phone]   · diclofenac 50 MG EC tablet  · traMADol 50 MG tablet          Mathew Flores MD  05/02/22 8464

## 2022-05-18 ENCOUNTER — LAB (OUTPATIENT)
Dept: FAMILY MEDICINE CLINIC | Facility: CLINIC | Age: 52
End: 2022-05-18

## 2022-05-18 ENCOUNTER — OFFICE VISIT (OUTPATIENT)
Dept: FAMILY MEDICINE CLINIC | Facility: CLINIC | Age: 52
End: 2022-05-18

## 2022-05-18 VITALS
DIASTOLIC BLOOD PRESSURE: 75 MMHG | OXYGEN SATURATION: 97 % | HEIGHT: 68 IN | BODY MASS INDEX: 34.71 KG/M2 | SYSTOLIC BLOOD PRESSURE: 119 MMHG | TEMPERATURE: 97.8 F | WEIGHT: 229 LBS | HEART RATE: 65 BPM

## 2022-05-18 DIAGNOSIS — F41.9 ANXIETY DISORDER, UNSPECIFIED TYPE: ICD-10-CM

## 2022-05-18 DIAGNOSIS — M25.532 LEFT WRIST PAIN: ICD-10-CM

## 2022-05-18 DIAGNOSIS — E03.9 HYPOTHYROIDISM, UNSPECIFIED TYPE: ICD-10-CM

## 2022-05-18 DIAGNOSIS — Z00.00 PREVENTATIVE HEALTH CARE: Primary | ICD-10-CM

## 2022-05-18 DIAGNOSIS — M79.642 PAIN IN BOTH HANDS: ICD-10-CM

## 2022-05-18 DIAGNOSIS — E78.5 HYPERLIPIDEMIA, UNSPECIFIED HYPERLIPIDEMIA TYPE: ICD-10-CM

## 2022-05-18 DIAGNOSIS — Z00.00 PREVENTATIVE HEALTH CARE: ICD-10-CM

## 2022-05-18 DIAGNOSIS — G47.33 OBSTRUCTIVE SLEEP APNEA SYNDROME: ICD-10-CM

## 2022-05-18 DIAGNOSIS — M79.641 PAIN IN BOTH HANDS: ICD-10-CM

## 2022-05-18 LAB
ALBUMIN SERPL-MCNC: 4 G/DL (ref 3.5–5.2)
ALBUMIN/GLOB SERPL: 1.3 G/DL
ALP SERPL-CCNC: 87 U/L (ref 39–117)
ALT SERPL W P-5'-P-CCNC: 16 U/L (ref 1–33)
ANION GAP SERPL CALCULATED.3IONS-SCNC: 9.3 MMOL/L (ref 5–15)
AST SERPL-CCNC: 11 U/L (ref 1–32)
BILIRUB SERPL-MCNC: 0.3 MG/DL (ref 0–1.2)
BUN SERPL-MCNC: 18 MG/DL (ref 6–20)
BUN/CREAT SERPL: 22.2 (ref 7–25)
CALCIUM SPEC-SCNC: 10.1 MG/DL (ref 8.6–10.5)
CHLORIDE SERPL-SCNC: 104 MMOL/L (ref 98–107)
CHOLEST SERPL-MCNC: 189 MG/DL (ref 0–200)
CO2 SERPL-SCNC: 24.7 MMOL/L (ref 22–29)
CREAT SERPL-MCNC: 0.81 MG/DL (ref 0.57–1)
EGFRCR SERPLBLD CKD-EPI 2021: 86.4 ML/MIN/1.73
GLOBULIN UR ELPH-MCNC: 3.2 GM/DL
GLUCOSE SERPL-MCNC: 70 MG/DL (ref 65–99)
HDLC SERPL-MCNC: 45 MG/DL (ref 40–60)
LDLC SERPL CALC-MCNC: 123 MG/DL (ref 0–100)
LDLC/HDLC SERPL: 2.69 {RATIO}
POTASSIUM SERPL-SCNC: 4.4 MMOL/L (ref 3.5–5.2)
PROT SERPL-MCNC: 7.2 G/DL (ref 6–8.5)
SODIUM SERPL-SCNC: 138 MMOL/L (ref 136–145)
TRIGL SERPL-MCNC: 114 MG/DL (ref 0–150)
TSH SERPL DL<=0.05 MIU/L-ACNC: 2.18 UIU/ML (ref 0.27–4.2)
VLDLC SERPL-MCNC: 21 MG/DL (ref 5–40)

## 2022-05-18 PROCEDURE — 99396 PREV VISIT EST AGE 40-64: CPT | Performed by: NURSE PRACTITIONER

## 2022-05-18 PROCEDURE — 80053 COMPREHEN METABOLIC PANEL: CPT | Performed by: NURSE PRACTITIONER

## 2022-05-18 PROCEDURE — 84443 ASSAY THYROID STIM HORMONE: CPT | Performed by: NURSE PRACTITIONER

## 2022-05-18 PROCEDURE — 80061 LIPID PANEL: CPT | Performed by: NURSE PRACTITIONER

## 2022-05-18 PROCEDURE — 36415 COLL VENOUS BLD VENIPUNCTURE: CPT

## 2022-05-18 RX ORDER — LEVOTHYROXINE SODIUM 0.1 MG/1
TABLET ORAL
Qty: 30 TABLET | Refills: 0 | Status: SHIPPED | OUTPATIENT
Start: 2022-05-18 | End: 2022-06-15

## 2022-05-18 RX ORDER — PROGESTERONE 100 MG/1
1 CAPSULE ORAL NIGHTLY
COMMUNITY
Start: 2021-12-16 | End: 2022-11-21

## 2022-05-18 RX ORDER — PHENAZOPYRIDINE HYDROCHLORIDE 100 MG/1
TABLET, FILM COATED ORAL
COMMUNITY
Start: 2022-01-03 | End: 2022-06-30

## 2022-05-18 RX ORDER — ESTRADIOL 0.75 MG/1.25G
GEL, METERED TOPICAL
COMMUNITY
Start: 2022-03-03

## 2022-05-18 NOTE — PROGRESS NOTES
Subjective     Jimena Welch is a 52 y.o. female.     Patient is here today for annual CPE and with c/o hand pain.  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  She states she doesn't do the best with her diet all the time.      Hypothyroid-  Had her thyroid removed.  Had nodules when she was 11.    She is currently taking synthroid 100 mcg. She is doing well on the medication at this time.  She does not see endocrinology.      Interstitial Cystitis-  Sees urology- Abby Rosario.  Takes pyridium and hydroxyzine.  Watches her diet. She is doing ok at this time.      Hormone replacement- is on prasterone insert.  She is also on estradiol gel sees Abby Rosario. She is doing ok with those at this time.      GERD- takes 20mg omeprazole occasionally.  It is controlled fine.     Anxiety- takes 100mg zoloft daily. Mood is doing well at this time. Denies SI or HI      Hyperlipidemia- Takes 10mg lipitor. She is tolerating it well.       Sleep apnea- does not use a CPAP. She states she has been having trouble sleeping, but it is starting to improve. She would like to repeat sleep study in the future       IBS- takes bentyl nightly.  It helps with her symptoms. She has diarrhea often.  She bleeds at times. Had colonoscopy in 2018 and everything was fine.     Hand pain- pt reports that her hands hurt regularly.  She reports that her distal joint have nodules.  Her hands ache all the time. They can be tender to touch.  Her left thumb is quite painful. She is concerned that she could have arthritis.  Her toes are also tender when they are touched.  Her grandmother had RA    Left wrist pain- pt states she fell on her wrist on 5/2. She went to the ER. It was very swollen when she had xrays. She has pain on the ulnar side.         Labs- due  Pap smear- UTD sees Abby Statin- normal  Mammogram- 12/14/21  DEXA-n/a  Colonoscopy- cologuard 1/5/22     Vaccines:  Flu-  n/a  PNA- n/a  Shingles- due  Tdap-  "UTD     Dental exam- due  Eye exam- utd       The following portions of the patient's history were reviewed and updated as appropriate: allergies, current medications, past family history, past medical history, past social history, past surgical history and problem list.    Review of Systems   Constitutional: Negative for appetite change, chills, fatigue and fever.   HENT: Negative for congestion, ear pain, hearing loss, postnasal drip, rhinorrhea, sinus pressure, sore throat, swollen glands and trouble swallowing.    Eyes: Negative for blurred vision, double vision, pain, discharge, itching and visual disturbance.   Respiratory: Negative for cough, chest tightness, shortness of breath and wheezing.    Cardiovascular: Negative for chest pain and palpitations.   Gastrointestinal: Positive for anal bleeding and diarrhea. Negative for abdominal pain, blood in stool, constipation, nausea and vomiting.   Endocrine: Negative for polydipsia, polyphagia and polyuria.   Genitourinary: Negative for dysuria, flank pain, frequency and urgency.   Musculoskeletal: Positive for arthralgias (hand pain). Negative for back pain and myalgias.   Skin: Negative for rash and skin lesions.   Neurological: Negative for dizziness, weakness, numbness and headache.   Psychiatric/Behavioral: Negative for depressed mood and stress. The patient is not nervous/anxious.        Objective     /75 (BP Location: Left arm, Patient Position: Sitting, Cuff Size: Large Adult)   Pulse 65   Temp 97.8 °F (36.6 °C) (Tympanic)   Ht 171.5 cm (67.5\")   Wt 104 kg (229 lb)   SpO2 97%   BMI 35.34 kg/m²     Current Outpatient Medications on File Prior to Visit   Medication Sig Dispense Refill   • Estrogel 0.75 MG/1.25 GM (0.06%) topical gel      • phenazopyridine (PYRIDIUM) 100 MG tablet TAKE ONE TABLET BY MOUTH EVERY 6 HOURS AS NEEDED FOR BLADDER PAIN     • Progesterone (PROMETRIUM) 100 MG capsule Take 1 capsule by mouth Every Night.     • albuterol " sulfate  (90 Base) MCG/ACT inhaler Inhale 2 puffs.     • atorvastatin (LIPITOR) 10 MG tablet TAKE ONE TABLET BY MOUTH DAILY 90 tablet 4   • azelastine (ASTELIN) 0.1 % nasal spray      • diclofenac (VOLTAREN) 50 MG EC tablet Take 1 tablet by mouth 2 (Two) Times a Day. 10 tablet 0   • dicyclomine (BENTYL) 20 MG tablet      • fluticasone (FLONASE) 50 MCG/ACT nasal spray into the nostril(s) as directed by provider.     • hydrOXYzine (ATARAX) 10 MG tablet      • levothyroxine (SYNTHROID, LEVOTHROID) 100 MCG tablet Take 1 tablet by mouth Daily. Last refill- schedule appt 30 tablet 0   • meloxicam (MOBIC) 15 MG tablet TAKE 1 TABLET BY MOUTH EVERY DAY 30 tablet 0   • omeprazole (priLOSEC) 20 MG capsule Take 20 mg by mouth Daily.     • ondansetron ODT (ZOFRAN-ODT) 4 MG disintegrating tablet Place 1 tablet on the tongue Every 8 (Eight) Hours As Needed for Nausea. 12 tablet 0   • Prasterone 6.5 MG insert Insert 1 suppository into the vagina Daily.     • sertraline (ZOLOFT) 100 MG tablet TAKE ONE TABLET BY MOUTH DAILY 90 tablet 4   • traMADol (ULTRAM) 50 MG tablet Take 1 tablet by mouth Every 6 (Six) Hours As Needed for Moderate Pain  or Severe Pain . 12 tablet 0   • [DISCONTINUED] Estradiol (Divigel) 0.75 MG/0.75GM gel Place 1 package on the skin as directed by provider Daily.       No current facility-administered medications on file prior to visit.        Physical Exam  Vitals reviewed.   Constitutional:       General: She is not in acute distress.     Appearance: Normal appearance. She is well-developed. She is not diaphoretic.   HENT:      Head: Normocephalic and atraumatic.      Right Ear: Tympanic membrane and ear canal normal.      Left Ear: Tympanic membrane and ear canal normal.      Nose: No congestion or rhinorrhea.      Mouth/Throat:      Pharynx: No oropharyngeal exudate.   Eyes:      General:         Right eye: No discharge.         Left eye: No discharge.      Extraocular Movements: Extraocular movements  intact.      Conjunctiva/sclera: Conjunctivae normal.   Cardiovascular:      Rate and Rhythm: Normal rate and regular rhythm.      Heart sounds: No murmur heard.  Pulmonary:      Effort: Pulmonary effort is normal. No respiratory distress.      Breath sounds: Normal breath sounds. No wheezing or rales.   Abdominal:      General: Bowel sounds are normal.      Palpations: Abdomen is soft.   Musculoskeletal:         General: Normal range of motion.      Cervical back: Normal range of motion.      Comments: Left wrist splint. Nodules on distal phalange joints   Skin:     General: Skin is warm and dry.   Neurological:      General: No focal deficit present.      Mental Status: She is alert and oriented to person, place, and time.   Psychiatric:         Mood and Affect: Mood normal.         Behavior: Behavior normal.         Thought Content: Thought content normal.         Judgment: Judgment normal.           Assessment & Plan     Diagnoses and all orders for this visit:    1. Preventative health care (Primary)  Comments:  work on diet and exercise  check labs  mammo UTD  cologuard UTD  sees GYN  Orders:  -     Comprehensive Metabolic Panel; Future  -     Lipid Panel; Future    2. Pain in both hands  Comments:  RA panel negative  continues to have hand pain  referral to rheum for further eval  Orders:  -     Ambulatory Referral to Rheumatology    3. Hypothyroidism, unspecified type  Comments:  stable  check TSH  cont med    Orders:  -     TSH; Future    4. Left wrist pain  Comments:  repeat xray  cont splint  ibuprofen  Orders:  -     XR Hand 3+ View Left; Future    5. Hyperlipidemia, unspecified hyperlipidemia type  Comments:  stable  work on diet and exercise  check labs    6. Anxiety disorder, unspecified type  Comments:  stable  cont med    7. Obstructive sleep apnea syndrome  Comments:  hasnt used CPAP

## 2022-05-18 NOTE — PATIENT INSTRUCTIONS
Work on diet ad exercise  Check labs  Referral to rheumatology  Get xray  Call for issues or concerns

## 2022-06-15 RX ORDER — LEVOTHYROXINE SODIUM 0.1 MG/1
TABLET ORAL
Qty: 30 TABLET | Refills: 0 | Status: SHIPPED | OUTPATIENT
Start: 2022-06-15 | End: 2022-06-16

## 2022-06-16 RX ORDER — LEVOTHYROXINE SODIUM 0.1 MG/1
TABLET ORAL
Qty: 30 TABLET | Refills: 0 | Status: SHIPPED | OUTPATIENT
Start: 2022-06-16 | End: 2022-08-21

## 2022-07-12 ENCOUNTER — OFFICE VISIT (OUTPATIENT)
Dept: FAMILY MEDICINE CLINIC | Facility: CLINIC | Age: 52
End: 2022-07-12

## 2022-07-12 VITALS
SYSTOLIC BLOOD PRESSURE: 117 MMHG | HEART RATE: 83 BPM | OXYGEN SATURATION: 95 % | TEMPERATURE: 97.8 F | HEIGHT: 68 IN | BODY MASS INDEX: 34.13 KG/M2 | DIASTOLIC BLOOD PRESSURE: 80 MMHG | WEIGHT: 225.2 LBS

## 2022-07-12 DIAGNOSIS — J45.41 MODERATE PERSISTENT REACTIVE AIRWAY DISEASE WITH ACUTE EXACERBATION: Primary | ICD-10-CM

## 2022-07-12 PROCEDURE — 99213 OFFICE O/P EST LOW 20 MIN: CPT | Performed by: FAMILY MEDICINE

## 2022-07-12 RX ORDER — BENZONATATE 100 MG/1
100 CAPSULE ORAL 3 TIMES DAILY PRN
Qty: 30 CAPSULE | Refills: 0 | Status: SHIPPED | OUTPATIENT
Start: 2022-07-12 | End: 2022-11-21

## 2022-07-12 NOTE — PROGRESS NOTES
Assessment and Plan     Problem List Items Addressed This Visit    None     Visit Diagnoses     Moderate persistent reactive airway disease with acute exacerbation    -  Primary    Relevant Medications    Beclomethasone Diprop HFA (QVAR Redihaler) 40 MCG/ACT inhaler    benzonatate (Tessalon Perles) 100 MG capsule      Patient advised to start ICS inhaler for worsening asthma.  Advised to discontinue Bromfed and continue Mucinex DM for cough and chest congestion.  Additionally patient can try Tessalon Perles.  Continue albuterol as needed.  Consider switching to levalbuterol given patient's experience with albuterol.    Return in about 3 months (around 10/12/2022) for Recheck BREATHING & COUGH.        Patient was given instructions and counseling regarding her condition or for health maintenance advice. Please see specific information pulled into the AVS if appropriate.        Jimena is a 52 y.o. being seen today for  Shortness of Breath (Onset: Few weeks./She has been checked at  and just finished prednisone on Sunday. Pt states she had an asthma attack for the first time in years yesterday. ) and Cough   Subjective   History of the Present Illness     Obese  female with a concurrent medical history of asthma presents with complaints of congestion and cough. Reports minimal sputum production. Patient was seen at a local urgent care and [facility] in June 2022 for an upper respiratory infection.  Was treated with a course of steroids.  Reports that symptoms have persisted.    Followed by ENT/allergist for seasonal allergies and reports compliance with nasal sprays. Patient is also prescribed albuterol by allergist. Has been using albuterol inhaler daily in recent week due to persistent cough. Complains of feeling jittery with albuterol.    Patient works outside in a nursery.     Social History  She  reports that she has never smoked. She has never used smokeless tobacco. She reports that she does not  "drink alcohol and does not use drugs.  Objective   Vital Signs          Vitals:    07/12/22 1105   BP: 117/80   BP Location: Left arm   Patient Position: Sitting   Cuff Size: Adult   Pulse: 83   Temp: 97.8 °F (36.6 °C)   TempSrc: Oral   SpO2: 95%   Weight: 102 kg (225 lb 3.2 oz)   Height: 172.7 cm (68\")     BP Readings from Last 1 Encounters:   07/12/22 117/80     Wt Readings from Last 3 Encounters:   07/12/22 102 kg (225 lb 3.2 oz)   06/30/22 104 kg (230 lb)   05/18/22 104 kg (229 lb)   Body mass index is 34.24 kg/m².     Physical Exam  Vitals reviewed.   Constitutional:       General: She is not in acute distress.     Appearance: Normal appearance. She is obese.   HENT:      Head: Normocephalic and atraumatic.      Right Ear: Tympanic membrane and ear canal normal.      Left Ear: Tympanic membrane and ear canal normal.      Mouth/Throat:      Mouth: Mucous membranes are moist.      Pharynx: Oropharynx is clear.   Eyes:      Extraocular Movements: Extraocular movements intact.      Pupils: Pupils are equal, round, and reactive to light.   Cardiovascular:      Rate and Rhythm: Normal rate.      Heart sounds: Normal heart sounds.   Pulmonary:      Effort: Pulmonary effort is normal.      Breath sounds: Normal breath sounds.   Lymphadenopathy:      Cervical: No cervical adenopathy.   Neurological:      Mental Status: She is alert and oriented to person, place, and time.   Psychiatric:         Mood and Affect: Mood normal.         Behavior: Behavior normal.               Lipid Panel (05/18/2022 08:45)  Comprehensive Metabolic Panel (05/18/2022 08:45)  TSH (05/18/2022 08:45)  POCT Strep A, molecular (06/30/2022 18:17)    "

## 2022-07-19 ENCOUNTER — TELEPHONE (OUTPATIENT)
Dept: FAMILY MEDICINE CLINIC | Facility: CLINIC | Age: 52
End: 2022-07-19

## 2022-07-19 DIAGNOSIS — J45.41 MODERATE PERSISTENT REACTIVE AIRWAY DISEASE WITH ACUTE EXACERBATION: Primary | ICD-10-CM

## 2022-07-19 NOTE — TELEPHONE ENCOUNTER
Qvar is a plan exclusion. Alt options:Arnuity Ellipta,Flovent HFA,Flovent Diskus,Pulmicort Flexhaler.

## 2022-07-20 NOTE — TELEPHONE ENCOUNTER
Please call patient and let her know that a new prescription for a maintenance inhaler has been sent to her pharmacy.  This is the preferred drug per her insurance company.

## 2022-08-21 RX ORDER — LEVOTHYROXINE SODIUM 0.1 MG/1
TABLET ORAL
Qty: 30 TABLET | Refills: 0 | Status: SHIPPED | OUTPATIENT
Start: 2022-08-21 | End: 2022-10-05 | Stop reason: SDUPTHER

## 2022-10-05 RX ORDER — LEVOTHYROXINE SODIUM 0.1 MG/1
100 TABLET ORAL DAILY
Qty: 30 TABLET | Refills: 0 | Status: SHIPPED | OUTPATIENT
Start: 2022-10-05 | End: 2022-11-07

## 2022-11-07 RX ORDER — LEVOTHYROXINE SODIUM 0.1 MG/1
TABLET ORAL
Qty: 30 TABLET | Refills: 0 | Status: SHIPPED | OUTPATIENT
Start: 2022-11-07 | End: 2022-12-05

## 2022-11-20 NOTE — PROGRESS NOTES
Subjective     Jimena Welch is a 52 y.o. female.     History of Present Illness  Patient is here today for follow-up on her chronic medical conditions.  Pt has had a cough.  It has been off and on since June after she had a illness.  She continues to cough and sometimes chokes.  Its worse in the morning.  She take zyrtec daily  She does not smoke but grew up in a smoking environment.    Hypothyroid-  Had her thyroid removed.  Had nodules when she was 11.    She is currently taking synthroid 100 mcg. She is doing well on the medication at this time.  She does not see endocrinology.      Interstitial Cystitis-  Sees urology- Abby Rosario.  Takes pyridium and hydroxyzine.  Watches her diet. She is doing ok at this time. She is having surgery on 12/23 a cystohydro. She is hoping to start mybetriq and an abx.     Hormone replacement- is on prasterone insert.  She is also on estradiol gel sees Abby Rosario. She is doing ok with those at this time.      GERD- takes 20mg omeprazole occasionally.  It is controlled fine.     Anxiety- takes 100mg zoloft daily. Mood is doing well at this time. Denies SI or HI      Hyperlipidemia- Takes 10mg lipitor. She is tolerating it well.       Sleep apnea- does not use a CPAP. She states she has been having trouble sleeping, but it is starting to improve. She would like to repeat sleep study in the future. She will get scheduled. ENT will likely do it.       IBS- she is taking a probiotic. It is helping.  Had colonoscopy in 2018 and everything was fine.     RA/Hand pain- she is now seeing rheumatology. She is on meloxicam. She has RA.      Labs- due  Pap smear- UTD sees Abby Rosario- normal  Mammogram- 12/14/21  DEXA-n/a  Colonoscopy- cologuard 1/5/22     Vaccines:  Flu-  due  PNA- n/a  Shingles- due  Tdap- UTD     Dental exam- due  Eye exam- utd       The following portions of the patient's history were reviewed and updated as appropriate: allergies, current medications, past  "family history, past medical history, past social history, past surgical history and problem list.    Review of Systems   Constitutional: Negative for chills, fatigue and fever.   HENT: Positive for congestion and postnasal drip.    Respiratory: Positive for cough. Negative for chest tightness and shortness of breath.    Cardiovascular: Negative for chest pain and palpitations.   Gastrointestinal: Negative for abdominal pain, nausea and vomiting.   Musculoskeletal: Positive for arthralgias.   Neurological: Negative for dizziness and headache.   Psychiatric/Behavioral: Negative for depressed mood and stress. The patient is not nervous/anxious.        Objective     /78 (BP Location: Right arm, Patient Position: Sitting, Cuff Size: Large Adult)   Pulse 75   Temp 98.7 °F (37.1 °C) (Temporal)   Ht 172.7 cm (68\")   Wt 103 kg (226 lb)   SpO2 98%   BMI 34.36 kg/m²     Current Outpatient Medications on File Prior to Visit   Medication Sig Dispense Refill   • albuterol sulfate  (90 Base) MCG/ACT inhaler Inhale 2 puffs.     • atorvastatin (LIPITOR) 10 MG tablet TAKE ONE TABLET BY MOUTH DAILY 90 tablet 4   • azelastine (ASTELIN) 0.1 % nasal spray      • Estrogel 0.75 MG/1.25 GM (0.06%) topical gel      • fluticasone (FLONASE) 50 MCG/ACT nasal spray into the nostril(s) as directed by provider.     • Fluticasone Furoate 50 MCG/ACT aerosol powder  Inhale 1 puff Daily. Rinse mouth after each use. 30 each 5   • hydrOXYzine (ATARAX) 10 MG tablet Take 1 tablet by mouth Every Night.     • levothyroxine (SYNTHROID, LEVOTHROID) 100 MCG tablet TAKE ONE TABLET BY MOUTH DAILY 30 tablet 0   • meloxicam (MOBIC) 15 MG tablet Take 1 tablet by mouth Daily.     • Mirabegron ER (MYRBETRIQ) 50 MG tablet sustained-release 24 hour 24 hr tablet Take 50 mg by mouth Daily.     • omeprazole (priLOSEC) 20 MG capsule Take 20 mg by mouth Daily.     • Prasterone 6.5 MG insert Insert 1 suppository into the vagina Daily.     • sertraline " (ZOLOFT) 100 MG tablet TAKE ONE TABLET BY MOUTH DAILY 90 tablet 4   • [DISCONTINUED] benzonatate (Tessalon Perles) 100 MG capsule Take 1 capsule by mouth 3 (Three) Times a Day As Needed for Cough. 30 capsule 0   • [DISCONTINUED] brompheniramine-pseudoephedrine-DM 30-2-10 MG/5ML syrup Take 5 mL by mouth 4 (Four) Times a Day As Needed for Congestion or Cough. 473 mL 0   • [DISCONTINUED] diclofenac (VOLTAREN) 50 MG EC tablet Take 1 tablet by mouth 2 (Two) Times a Day. 10 tablet 0   • [DISCONTINUED] Progesterone (PROMETRIUM) 100 MG capsule Take 1 capsule by mouth Every Night.       No current facility-administered medications on file prior to visit.        Physical Exam  Vitals reviewed.   Constitutional:       General: She is not in acute distress.     Appearance: Normal appearance. She is well-developed. She is not diaphoretic.   HENT:      Head: Normocephalic and atraumatic.   Eyes:      General:         Right eye: No discharge.         Left eye: No discharge.      Extraocular Movements: Extraocular movements intact.      Conjunctiva/sclera: Conjunctivae normal.   Cardiovascular:      Rate and Rhythm: Normal rate and regular rhythm.      Heart sounds: No murmur heard.  Pulmonary:      Effort: Pulmonary effort is normal. No respiratory distress.      Breath sounds: Normal breath sounds. No wheezing or rales.   Abdominal:      General: Bowel sounds are normal.      Palpations: Abdomen is soft.   Musculoskeletal:         General: Normal range of motion.      Cervical back: Normal range of motion.   Skin:     General: Skin is warm and dry.   Neurological:      General: No focal deficit present.      Mental Status: She is alert and oriented to person, place, and time.   Psychiatric:         Mood and Affect: Mood normal.         Behavior: Behavior normal.         Thought Content: Thought content normal.         Judgment: Judgment normal.           Assessment & Plan     Diagnoses and all orders for this visit:    1.  Hypothyroidism, unspecified type (Primary)  Comments:  stable  cont med  check labs  Orders:  -     TSH; Future    2. Hyperlipidemia, unspecified hyperlipidemia type  Comments:  stable  work on diet and exercise  check labs  Orders:  -     Comprehensive Metabolic Panel; Future  -     Lipid Panel; Future    3. Anxiety disorder, unspecified type  Comments:  stable  cont zoloft  denies SI or HI      4. Obstructive sleep apnea syndrome  Comments:  cant tolerate CPAP  will follow up with ENT    5. Breast cancer screening by mammogram  -     Mammo Screening Digital Tomosynthesis Bilateral With CAD; Future    6. Chronic cough  Comments:  possibly reflux related  try taking omeprazole daily  message update in 2 wks  if no imp then get CXR    7. Interstitial cystitis  Comments:  seeing urology  has upcoming procedure

## 2022-11-21 ENCOUNTER — LAB (OUTPATIENT)
Dept: FAMILY MEDICINE CLINIC | Facility: CLINIC | Age: 52
End: 2022-11-21

## 2022-11-21 ENCOUNTER — OFFICE VISIT (OUTPATIENT)
Dept: FAMILY MEDICINE CLINIC | Facility: CLINIC | Age: 52
End: 2022-11-21

## 2022-11-21 VITALS
WEIGHT: 226 LBS | DIASTOLIC BLOOD PRESSURE: 78 MMHG | SYSTOLIC BLOOD PRESSURE: 115 MMHG | TEMPERATURE: 98.7 F | OXYGEN SATURATION: 98 % | BODY MASS INDEX: 34.25 KG/M2 | HEIGHT: 68 IN | HEART RATE: 75 BPM

## 2022-11-21 DIAGNOSIS — E03.9 HYPOTHYROIDISM, UNSPECIFIED TYPE: Primary | ICD-10-CM

## 2022-11-21 DIAGNOSIS — N30.10 INTERSTITIAL CYSTITIS: ICD-10-CM

## 2022-11-21 DIAGNOSIS — G47.33 OBSTRUCTIVE SLEEP APNEA SYNDROME: ICD-10-CM

## 2022-11-21 DIAGNOSIS — R05.3 CHRONIC COUGH: ICD-10-CM

## 2022-11-21 DIAGNOSIS — F41.9 ANXIETY DISORDER, UNSPECIFIED TYPE: ICD-10-CM

## 2022-11-21 DIAGNOSIS — Z12.31 BREAST CANCER SCREENING BY MAMMOGRAM: ICD-10-CM

## 2022-11-21 DIAGNOSIS — E78.5 HYPERLIPIDEMIA, UNSPECIFIED HYPERLIPIDEMIA TYPE: ICD-10-CM

## 2022-11-21 DIAGNOSIS — E03.9 HYPOTHYROIDISM, UNSPECIFIED TYPE: ICD-10-CM

## 2022-11-21 DIAGNOSIS — Z23 NEED FOR IMMUNIZATION AGAINST INFLUENZA: ICD-10-CM

## 2022-11-21 LAB
ALBUMIN SERPL-MCNC: 4.1 G/DL (ref 3.5–5.2)
ALBUMIN/GLOB SERPL: 1.4 G/DL
ALP SERPL-CCNC: 89 U/L (ref 39–117)
ALT SERPL W P-5'-P-CCNC: 16 U/L (ref 1–33)
ANION GAP SERPL CALCULATED.3IONS-SCNC: 8.9 MMOL/L (ref 5–15)
AST SERPL-CCNC: 11 U/L (ref 1–32)
BILIRUB SERPL-MCNC: 0.4 MG/DL (ref 0–1.2)
BUN SERPL-MCNC: 19 MG/DL (ref 6–20)
BUN/CREAT SERPL: 26.4 (ref 7–25)
CALCIUM SPEC-SCNC: 9.7 MG/DL (ref 8.6–10.5)
CHLORIDE SERPL-SCNC: 103 MMOL/L (ref 98–107)
CHOLEST SERPL-MCNC: 205 MG/DL (ref 0–200)
CO2 SERPL-SCNC: 28.1 MMOL/L (ref 22–29)
CREAT SERPL-MCNC: 0.72 MG/DL (ref 0.57–1)
EGFRCR SERPLBLD CKD-EPI 2021: 99.5 ML/MIN/1.73
GLOBULIN UR ELPH-MCNC: 3 GM/DL
GLUCOSE SERPL-MCNC: 95 MG/DL (ref 65–99)
HDLC SERPL-MCNC: 53 MG/DL (ref 40–60)
LDLC SERPL CALC-MCNC: 133 MG/DL (ref 0–100)
LDLC/HDLC SERPL: 2.47 {RATIO}
POTASSIUM SERPL-SCNC: 4.1 MMOL/L (ref 3.5–5.2)
PROT SERPL-MCNC: 7.1 G/DL (ref 6–8.5)
SODIUM SERPL-SCNC: 140 MMOL/L (ref 136–145)
TRIGL SERPL-MCNC: 106 MG/DL (ref 0–150)
TSH SERPL DL<=0.05 MIU/L-ACNC: 1.45 UIU/ML (ref 0.27–4.2)
VLDLC SERPL-MCNC: 19 MG/DL (ref 5–40)

## 2022-11-21 PROCEDURE — 90471 IMMUNIZATION ADMIN: CPT | Performed by: NURSE PRACTITIONER

## 2022-11-21 PROCEDURE — 84443 ASSAY THYROID STIM HORMONE: CPT | Performed by: NURSE PRACTITIONER

## 2022-11-21 PROCEDURE — 99214 OFFICE O/P EST MOD 30 MIN: CPT | Performed by: NURSE PRACTITIONER

## 2022-11-21 PROCEDURE — 80053 COMPREHEN METABOLIC PANEL: CPT | Performed by: NURSE PRACTITIONER

## 2022-11-21 PROCEDURE — 36415 COLL VENOUS BLD VENIPUNCTURE: CPT

## 2022-11-21 PROCEDURE — 90686 IIV4 VACC NO PRSV 0.5 ML IM: CPT | Performed by: NURSE PRACTITIONER

## 2022-11-21 PROCEDURE — 80061 LIPID PANEL: CPT | Performed by: NURSE PRACTITIONER

## 2022-11-21 RX ORDER — MELOXICAM 15 MG/1
1 TABLET ORAL DAILY
COMMUNITY
Start: 2022-10-19

## 2022-11-21 NOTE — PATIENT INSTRUCTIONS
Take omeprazole daily and message me update on cough in 2 weeks  Check labs  Work on diet and exercise

## 2022-12-05 RX ORDER — LEVOTHYROXINE SODIUM 0.1 MG/1
TABLET ORAL
Qty: 30 TABLET | Refills: 0 | Status: SHIPPED | OUTPATIENT
Start: 2022-12-05

## 2022-12-27 ENCOUNTER — OFFICE VISIT (OUTPATIENT)
Dept: FAMILY MEDICINE CLINIC | Facility: CLINIC | Age: 52
End: 2022-12-27

## 2022-12-27 VITALS
BODY MASS INDEX: 35.13 KG/M2 | HEART RATE: 78 BPM | WEIGHT: 231.8 LBS | OXYGEN SATURATION: 97 % | HEIGHT: 68 IN | TEMPERATURE: 97.3 F

## 2022-12-27 DIAGNOSIS — J01.00 ACUTE NON-RECURRENT MAXILLARY SINUSITIS: ICD-10-CM

## 2022-12-27 DIAGNOSIS — J20.9 MODERATE PERSISTENT ASTHMA WITH ACUTE BRONCHITIS AND ACUTE EXACERBATION: Primary | ICD-10-CM

## 2022-12-27 DIAGNOSIS — J45.41 MODERATE PERSISTENT ASTHMA WITH ACUTE BRONCHITIS AND ACUTE EXACERBATION: Primary | ICD-10-CM

## 2022-12-27 PROCEDURE — 99213 OFFICE O/P EST LOW 20 MIN: CPT | Performed by: PHYSICIAN ASSISTANT

## 2022-12-27 RX ORDER — NITROFURANTOIN 25; 75 MG/1; MG/1
CAPSULE ORAL
COMMUNITY
Start: 2022-12-11 | End: 2022-12-27

## 2022-12-27 RX ORDER — BENZONATATE 100 MG/1
100 CAPSULE ORAL 3 TIMES DAILY PRN
Qty: 30 CAPSULE | Refills: 0 | Status: SHIPPED | OUTPATIENT
Start: 2022-12-27

## 2022-12-27 RX ORDER — AMOXICILLIN AND CLAVULANATE POTASSIUM 875; 125 MG/1; MG/1
1 TABLET, FILM COATED ORAL 2 TIMES DAILY
Qty: 20 TABLET | Refills: 0 | Status: SHIPPED | OUTPATIENT
Start: 2022-12-27 | End: 2023-01-06

## 2022-12-27 RX ORDER — BROMPHENIRAMINE MALEATE, PSEUDOEPHEDRINE HYDROCHLORIDE, AND DEXTROMETHORPHAN HYDROBROMIDE 2; 30; 10 MG/5ML; MG/5ML; MG/5ML
SYRUP ORAL
COMMUNITY
Start: 2022-12-19 | End: 2022-12-27

## 2022-12-27 RX ORDER — DEXAMETHASONE 4 MG/1
TABLET ORAL
COMMUNITY
Start: 2022-11-21 | End: 2022-12-27

## 2022-12-27 RX ORDER — PREDNISONE 50 MG/1
50 TABLET ORAL DAILY
Qty: 5 TABLET | Refills: 0 | Status: SHIPPED | OUTPATIENT
Start: 2022-12-27 | End: 2023-01-01

## 2022-12-27 RX ORDER — FLUCONAZOLE 150 MG/1
150 TABLET ORAL ONCE
Qty: 1 TABLET | Refills: 0 | Status: SHIPPED | OUTPATIENT
Start: 2022-12-27 | End: 2022-12-27

## 2022-12-27 NOTE — PROGRESS NOTES
Subjective   Jimena Welch is a 52 y.o. female.     History of Present Illness  Pt presents with cough and nasal congestion.  She did test negative for the flu and COVID19 on the 19th at [facility]. She started with symptoms about 8 days ago. She was prescribed liquid cough syrup but she didn't tolerate side effects.  She has been using her inhalers and an old prescription of tessalon which helps somewhat.         The following portions of the patient's history were reviewed and updated as appropriate: allergies, current medications, past family history, past medical history, past social history, past surgical history and problem list.  Past Medical History:   Diagnosis Date   • Allergic    • Anxiety    • Arthritis    • Asthma    • Depression    • Hyperlipidemia    • Obesity    • Urinary tract infection      Past Surgical History:   Procedure Laterality Date   • BLADDER SURGERY     • CHOLECYSTECTOMY     • COLONOSCOPY     • HYSTERECTOMY     • NASAL SEPTUM SURGERY      2019   • SINUS SURGERY     • THYROIDECTOMY     • WISDOM TOOTH EXTRACTION       Family History   Problem Relation Age of Onset   • Heart disease Father    • Thyroid disease Father    • Depression Father    • Asthma Mother    • Cancer Mother    • Thyroid disease Mother    • Arthritis Mother    • Depression Mother    • Cancer Paternal Grandfather      Social History     Socioeconomic History   • Marital status:    Tobacco Use   • Smoking status: Never   • Smokeless tobacco: Never   Vaping Use   • Vaping Use: Never used   Substance and Sexual Activity   • Alcohol use: Not Currently     Comment: rarely   • Drug use: No   • Sexual activity: Yes     Partners: Male     Birth control/protection: Post-menopausal, None         Current Outpatient Medications:   •  atorvastatin (LIPITOR) 10 MG tablet, TAKE ONE TABLET BY MOUTH DAILY, Disp: 90 tablet, Rfl: 4  •  azelastine (ASTELIN) 0.1 % nasal spray, , Disp: , Rfl:   •  Estrogel 0.75 MG/1.25 GM (0.06%)  topical gel, , Disp: , Rfl:   •  fluticasone (FLONASE) 50 MCG/ACT nasal spray, into the nostril(s) as directed by provider., Disp: , Rfl:   •  Fluticasone Furoate 50 MCG/ACT aerosol powder , Inhale 1 puff Daily. Rinse mouth after each use., Disp: 30 each, Rfl: 5  •  hydrOXYzine (ATARAX) 10 MG tablet, Take 1 tablet by mouth Every Night., Disp: , Rfl:   •  levothyroxine (SYNTHROID, LEVOTHROID) 100 MCG tablet, TAKE ONE TABLET BY MOUTH DAILY, Disp: 30 tablet, Rfl: 0  •  meloxicam (MOBIC) 15 MG tablet, Take 1 tablet by mouth Daily., Disp: , Rfl:   •  Mirabegron ER (MYRBETRIQ) 50 MG tablet sustained-release 24 hour 24 hr tablet, Take 50 mg by mouth Daily., Disp: , Rfl:   •  omeprazole (priLOSEC) 20 MG capsule, Take 20 mg by mouth Daily., Disp: , Rfl:   •  Prasterone 6.5 MG insert, Insert 1 suppository into the vagina Daily., Disp: , Rfl:   •  sertraline (ZOLOFT) 100 MG tablet, TAKE ONE TABLET BY MOUTH DAILY, Disp: 90 tablet, Rfl: 4  •  amoxicillin-clavulanate (Augmentin) 875-125 MG per tablet, Take 1 tablet by mouth 2 (Two) Times a Day for 10 days., Disp: 20 tablet, Rfl: 0  •  benzonatate (Tessalon Perles) 100 MG capsule, Take 1 capsule by mouth 3 (Three) Times a Day As Needed for Cough., Disp: 30 capsule, Rfl: 0  •  predniSONE (DELTASONE) 50 MG tablet, Take 1 tablet by mouth Daily for 5 days., Disp: 5 tablet, Rfl: 0    Review of Systems   Constitutional: Positive for activity change and fatigue. Negative for appetite change, chills, diaphoresis, fever, unexpected weight gain and unexpected weight loss.   HENT: Positive for congestion and sinus pressure. Negative for ear discharge, ear pain, sore throat and trouble swallowing.    Eyes: Negative for visual disturbance.   Respiratory: Positive for cough and wheezing.    Cardiovascular: Negative for chest pain, palpitations and leg swelling.   Gastrointestinal: Negative for abdominal pain, constipation, diarrhea, nausea and vomiting.   Musculoskeletal: Negative for gait  "problem.   Neurological: Negative for dizziness, tremors, syncope, weakness, light-headedness, headache and confusion.   Psychiatric/Behavioral: Positive for sleep disturbance.     Pulse 78   Temp 97.3 °F (36.3 °C) (Temporal)   Ht 172.7 cm (68\")   Wt 105 kg (231 lb 12.8 oz)   SpO2 97%   BMI 35.25 kg/m²       Objective   Physical Exam  Vitals and nursing note reviewed.   Constitutional:       Appearance: Normal appearance.   HENT:      Head: Normocephalic and atraumatic.      Right Ear: Tympanic membrane, ear canal and external ear normal.      Left Ear: Tympanic membrane, ear canal and external ear normal.      Nose: Congestion present.      Right Sinus: Maxillary sinus tenderness present.      Left Sinus: Maxillary sinus tenderness present.      Mouth/Throat:      Mouth: Mucous membranes are moist.      Pharynx: Oropharynx is clear.   Eyes:      Pupils: Pupils are equal, round, and reactive to light.   Cardiovascular:      Rate and Rhythm: Normal rate and regular rhythm.      Heart sounds: Normal heart sounds.   Pulmonary:      Effort: Pulmonary effort is normal.      Breath sounds: Wheezing present.   Musculoskeletal:         General: Normal range of motion.      Cervical back: Normal range of motion and neck supple. No rigidity.   Lymphadenopathy:      Cervical: No cervical adenopathy.   Skin:     General: Skin is warm and dry.   Neurological:      General: No focal deficit present.      Mental Status: She is alert and oriented to person, place, and time.   Psychiatric:         Mood and Affect: Mood normal.         Behavior: Behavior normal.         Thought Content: Thought content normal.         Procedures       Diagnoses and all orders for this visit:    1. Moderate persistent asthma with acute bronchitis and acute exacerbation (Primary)  Comments:  Continue inhalers and add prednisone. Continue benzonatate as needed for cough.  Let me know if not improving or worsening. ER precautions given.  Orders:  -  "    predniSONE (DELTASONE) 50 MG tablet; Take 1 tablet by mouth Daily for 5 days.  Dispense: 5 tablet; Refill: 0  -     benzonatate (Tessalon Perles) 100 MG capsule; Take 1 capsule by mouth 3 (Three) Times a Day As Needed for Cough.  Dispense: 30 capsule; Refill: 0    2. Acute non-recurrent maxillary sinusitis  Comments:  Pt to take augmentin and let me know if symptoms are not improving.  Orders:  -     amoxicillin-clavulanate (Augmentin) 875-125 MG per tablet; Take 1 tablet by mouth 2 (Two) Times a Day for 10 days.  Dispense: 20 tablet; Refill: 0  -     fluconazole (Diflucan) 150 MG tablet; Take 1 tablet by mouth 1 (One) Time for 1 dose. Take after finished with antibiotic.  Dispense: 1 tablet; Refill: 0

## 2023-02-27 RX ORDER — SERTRALINE HYDROCHLORIDE 100 MG/1
TABLET, FILM COATED ORAL
Qty: 90 TABLET | Refills: 4 | Status: SHIPPED | OUTPATIENT
Start: 2023-02-27

## 2023-02-27 RX ORDER — ATORVASTATIN CALCIUM 10 MG/1
TABLET, FILM COATED ORAL
Qty: 90 TABLET | Refills: 4 | Status: SHIPPED | OUTPATIENT
Start: 2023-02-27

## 2023-02-27 RX ORDER — ALBUTEROL SULFATE 90 UG/1
AEROSOL, METERED RESPIRATORY (INHALATION)
COMMUNITY
Start: 2023-01-28

## 2023-04-21 RX ORDER — LEVOTHYROXINE SODIUM 0.1 MG/1
100 TABLET ORAL DAILY
Qty: 30 TABLET | Refills: 1 | Status: SHIPPED | OUTPATIENT
Start: 2023-04-21

## 2023-05-05 ENCOUNTER — OFFICE VISIT (OUTPATIENT)
Dept: FAMILY MEDICINE CLINIC | Facility: CLINIC | Age: 53
End: 2023-05-05
Payer: COMMERCIAL

## 2023-05-05 VITALS
BODY MASS INDEX: 36.28 KG/M2 | OXYGEN SATURATION: 95 % | HEIGHT: 68 IN | SYSTOLIC BLOOD PRESSURE: 144 MMHG | DIASTOLIC BLOOD PRESSURE: 79 MMHG | WEIGHT: 239.4 LBS | TEMPERATURE: 97.8 F | HEART RATE: 88 BPM

## 2023-05-05 DIAGNOSIS — B37.31 YEAST VAGINITIS: ICD-10-CM

## 2023-05-05 DIAGNOSIS — J01.40 ACUTE NON-RECURRENT PANSINUSITIS: Primary | ICD-10-CM

## 2023-05-05 PROCEDURE — 99213 OFFICE O/P EST LOW 20 MIN: CPT

## 2023-05-05 RX ORDER — FLUCONAZOLE 150 MG/1
150 TABLET ORAL ONCE
Qty: 1 TABLET | Refills: 0 | Status: SHIPPED | OUTPATIENT
Start: 2023-05-05 | End: 2023-05-05

## 2023-05-05 RX ORDER — AMOXICILLIN AND CLAVULANATE POTASSIUM 875; 125 MG/1; MG/1
1 TABLET, FILM COATED ORAL 2 TIMES DAILY
Qty: 20 TABLET | Refills: 0 | Status: SHIPPED | OUTPATIENT
Start: 2023-05-05 | End: 2023-05-15

## 2023-05-05 RX ORDER — METHYLPREDNISOLONE 4 MG/1
TABLET ORAL
Qty: 21 TABLET | Refills: 0 | Status: SHIPPED | OUTPATIENT
Start: 2023-05-05

## 2023-05-05 RX ORDER — PROGESTERONE 100 MG/1
CAPSULE ORAL
COMMUNITY
Start: 2023-02-27

## 2023-05-05 NOTE — PROGRESS NOTES
"Chief Complaint  Facial Pain    Subjective        Jimena Welch presents to [facility]  History of Present Illness    Pt is here today for facial pain. She says she thinks it is from her congestion. She has been running a low grade fever. She can't sleep because of her headache. She has tried mucinex sinus, zyrtec, and ibuprofen for headache. She reports right earache and tooth pain.    Objective   Vital Signs:  /79 (BP Location: Right arm, Patient Position: Sitting, Cuff Size: Large Adult)   Pulse 88   Temp 97.8 °F (36.6 °C) (Temporal)   Ht 172.7 cm (68\")   Wt 109 kg (239 lb 6.4 oz)   SpO2 95%   BMI 36.40 kg/m²   Estimated body mass index is 36.4 kg/m² as calculated from the following:    Height as of this encounter: 172.7 cm (68\").    Weight as of this encounter: 109 kg (239 lb 6.4 oz).          Review of Systems   Constitutional: Positive for fatigue. Negative for appetite change, chills and fever.   HENT: Positive for congestion, ear pain and sinus pressure. Negative for ear discharge, hearing loss, postnasal drip, rhinorrhea and sore throat.    Respiratory: Negative for cough, shortness of breath and wheezing.    Cardiovascular: Negative for chest pain, palpitations and leg swelling.   Gastrointestinal: Negative for abdominal pain, nausea and vomiting.   Musculoskeletal: Negative for myalgias, neck pain and neck stiffness.   Skin: Negative for color change, rash and wound.   Neurological: Negative for dizziness, syncope, light-headedness and confusion.   Psychiatric/Behavioral: Negative for agitation, behavioral problems and decreased concentration. The patient is not nervous/anxious.         Physical Exam  Constitutional:       General: She is not in acute distress.     Appearance: Normal appearance. She is obese. She is ill-appearing. She is not toxic-appearing or diaphoretic.   HENT:      Head: Normocephalic and atraumatic.      Right Ear: Ear canal and external " ear normal. A middle ear effusion is present. Tympanic membrane is erythematous.      Left Ear: Tympanic membrane, ear canal and external ear normal.      Nose: Congestion present. No rhinorrhea.      Right Sinus: Maxillary sinus tenderness and frontal sinus tenderness present.      Left Sinus: Maxillary sinus tenderness and frontal sinus tenderness present.      Mouth/Throat:      Mouth: Mucous membranes are moist.      Pharynx: Oropharynx is clear.   Eyes:      Conjunctiva/sclera: Conjunctivae normal.      Pupils: Pupils are equal, round, and reactive to light.   Cardiovascular:      Rate and Rhythm: Normal rate and regular rhythm.      Pulses: Normal pulses.      Heart sounds: Normal heart sounds.   Pulmonary:      Effort: Pulmonary effort is normal. No respiratory distress.      Breath sounds: Normal breath sounds.   Musculoskeletal:      Cervical back: No tenderness.   Lymphadenopathy:      Cervical: Cervical adenopathy present.   Skin:     General: Skin is warm and dry.   Neurological:      General: No focal deficit present.      Mental Status: She is alert and oriented to person, place, and time.   Psychiatric:         Mood and Affect: Mood normal.         Behavior: Behavior normal.         Thought Content: Thought content normal.         Judgment: Judgment normal.        Result Review :                Assessment and Plan   Diagnoses and all orders for this visit:    1. Acute non-recurrent pansinusitis (Primary)  -     methylPREDNISolone (MEDROL) 4 MG dose pack; Take as directed on package instructions.  Dispense: 21 tablet; Refill: 0  -     amoxicillin-clavulanate (Augmentin) 875-125 MG per tablet; Take 1 tablet by mouth 2 (Two) Times a Day for 10 days.  Dispense: 20 tablet; Refill: 0    2. Yeast vaginitis  -     fluconazole (Diflucan) 150 MG tablet; Take 1 tablet by mouth 1 (One) Time for 1 dose.  Dispense: 1 tablet; Refill: 0             Follow Up   Return if symptoms worsen or fail to improve.  Patient  was given instructions and counseling regarding her condition or for health maintenance advice. Please see specific information pulled into the AVS if appropriate.

## 2023-05-05 NOTE — PATIENT INSTRUCTIONS
You may begin feeling better before you are finished with your antibiotics. Please finish the course completely even after feeling better, to prevent future drug resistant infections.     Give antibiotics 3-4 days and let me know if no improvements.

## 2023-08-01 ENCOUNTER — OFFICE VISIT (OUTPATIENT)
Dept: FAMILY MEDICINE CLINIC | Facility: CLINIC | Age: 53
End: 2023-08-01
Payer: COMMERCIAL

## 2023-08-01 ENCOUNTER — LAB (OUTPATIENT)
Dept: FAMILY MEDICINE CLINIC | Facility: CLINIC | Age: 53
End: 2023-08-01
Payer: COMMERCIAL

## 2023-08-01 VITALS
HEIGHT: 68 IN | SYSTOLIC BLOOD PRESSURE: 128 MMHG | HEART RATE: 73 BPM | RESPIRATION RATE: 20 BRPM | DIASTOLIC BLOOD PRESSURE: 67 MMHG | TEMPERATURE: 98.2 F | OXYGEN SATURATION: 98 % | WEIGHT: 236.8 LBS | BODY MASS INDEX: 35.89 KG/M2

## 2023-08-01 DIAGNOSIS — G47.33 OSA (OBSTRUCTIVE SLEEP APNEA): ICD-10-CM

## 2023-08-01 DIAGNOSIS — N95.1 MENOPAUSAL SYMPTOMS: ICD-10-CM

## 2023-08-01 DIAGNOSIS — F32.A ANXIETY AND DEPRESSION: Primary | ICD-10-CM

## 2023-08-01 DIAGNOSIS — E78.00 PURE HYPERCHOLESTEROLEMIA: ICD-10-CM

## 2023-08-01 DIAGNOSIS — R53.83 FATIGUE, UNSPECIFIED TYPE: ICD-10-CM

## 2023-08-01 DIAGNOSIS — E03.9 HYPOTHYROIDISM, UNSPECIFIED TYPE: ICD-10-CM

## 2023-08-01 DIAGNOSIS — F41.9 ANXIETY AND DEPRESSION: Primary | ICD-10-CM

## 2023-08-01 LAB
ALBUMIN SERPL-MCNC: 4.1 G/DL (ref 3.5–5.2)
ALBUMIN/GLOB SERPL: 1.5 G/DL
ALP SERPL-CCNC: 89 U/L (ref 39–117)
ALT SERPL W P-5'-P-CCNC: 21 U/L (ref 1–33)
ANION GAP SERPL CALCULATED.3IONS-SCNC: 13.2 MMOL/L (ref 5–15)
AST SERPL-CCNC: 16 U/L (ref 1–32)
BASOPHILS # BLD AUTO: 0.05 10*3/MM3 (ref 0–0.2)
BASOPHILS NFR BLD AUTO: 0.8 % (ref 0–1.5)
BILIRUB SERPL-MCNC: 0.4 MG/DL (ref 0–1.2)
BUN SERPL-MCNC: 19 MG/DL (ref 6–20)
BUN/CREAT SERPL: 25 (ref 7–25)
CALCIUM SPEC-SCNC: 9.5 MG/DL (ref 8.6–10.5)
CHLORIDE SERPL-SCNC: 103 MMOL/L (ref 98–107)
CHOLEST SERPL-MCNC: 192 MG/DL (ref 0–200)
CO2 SERPL-SCNC: 23.8 MMOL/L (ref 22–29)
CREAT SERPL-MCNC: 0.76 MG/DL (ref 0.57–1)
DEPRECATED RDW RBC AUTO: 40.4 FL (ref 37–54)
EGFRCR SERPLBLD CKD-EPI 2021: 92.7 ML/MIN/1.73
EOSINOPHIL # BLD AUTO: 0 10*3/MM3 (ref 0–0.4)
EOSINOPHIL NFR BLD AUTO: 0 % (ref 0.3–6.2)
ERYTHROCYTE [DISTWIDTH] IN BLOOD BY AUTOMATED COUNT: 13.2 % (ref 12.3–15.4)
GLOBULIN UR ELPH-MCNC: 2.7 GM/DL
GLUCOSE SERPL-MCNC: 101 MG/DL (ref 65–99)
HCT VFR BLD AUTO: 40.4 % (ref 34–46.6)
HDLC SERPL-MCNC: 46 MG/DL (ref 40–60)
HGB BLD-MCNC: 13.5 G/DL (ref 12–15.9)
IMM GRANULOCYTES # BLD AUTO: 0 10*3/MM3 (ref 0–0.05)
IMM GRANULOCYTES NFR BLD AUTO: 0 % (ref 0–0.5)
LDLC SERPL CALC-MCNC: 115 MG/DL (ref 0–100)
LDLC/HDLC SERPL: 2.41 {RATIO}
LYMPHOCYTES # BLD AUTO: 2.27 10*3/MM3 (ref 0.7–3.1)
LYMPHOCYTES NFR BLD AUTO: 37.2 % (ref 19.6–45.3)
MCH RBC QN AUTO: 28.4 PG (ref 26.6–33)
MCHC RBC AUTO-ENTMCNC: 33.4 G/DL (ref 31.5–35.7)
MCV RBC AUTO: 85.1 FL (ref 79–97)
MONOCYTES # BLD AUTO: 0.51 10*3/MM3 (ref 0.1–0.9)
MONOCYTES NFR BLD AUTO: 8.4 % (ref 5–12)
NEUTROPHILS NFR BLD AUTO: 3.27 10*3/MM3 (ref 1.7–7)
NEUTROPHILS NFR BLD AUTO: 53.6 % (ref 42.7–76)
NRBC BLD AUTO-RTO: 0 /100 WBC (ref 0–0.2)
PLATELET # BLD AUTO: 244 10*3/MM3 (ref 140–450)
PMV BLD AUTO: 9.4 FL (ref 6–12)
POTASSIUM SERPL-SCNC: 4.3 MMOL/L (ref 3.5–5.2)
PROT SERPL-MCNC: 6.8 G/DL (ref 6–8.5)
RBC # BLD AUTO: 4.75 10*6/MM3 (ref 3.77–5.28)
SODIUM SERPL-SCNC: 140 MMOL/L (ref 136–145)
TRIGL SERPL-MCNC: 175 MG/DL (ref 0–150)
TSH SERPL DL<=0.05 MIU/L-ACNC: 1.81 UIU/ML (ref 0.27–4.2)
VLDLC SERPL-MCNC: 31 MG/DL (ref 5–40)
WBC NRBC COR # BLD: 6.1 10*3/MM3 (ref 3.4–10.8)

## 2023-08-01 PROCEDURE — 85025 COMPLETE CBC W/AUTO DIFF WBC: CPT | Performed by: PHYSICIAN ASSISTANT

## 2023-08-01 PROCEDURE — 80061 LIPID PANEL: CPT | Performed by: PHYSICIAN ASSISTANT

## 2023-08-01 PROCEDURE — 99214 OFFICE O/P EST MOD 30 MIN: CPT | Performed by: PHYSICIAN ASSISTANT

## 2023-08-01 PROCEDURE — 84443 ASSAY THYROID STIM HORMONE: CPT | Performed by: PHYSICIAN ASSISTANT

## 2023-08-01 PROCEDURE — 36415 COLL VENOUS BLD VENIPUNCTURE: CPT | Performed by: PHYSICIAN ASSISTANT

## 2023-08-01 PROCEDURE — 80053 COMPREHEN METABOLIC PANEL: CPT | Performed by: PHYSICIAN ASSISTANT

## 2023-08-01 RX ORDER — DESVENLAFAXINE SUCCINATE 50 MG/1
50 TABLET, EXTENDED RELEASE ORAL DAILY
Qty: 30 TABLET | Refills: 5 | Status: SHIPPED | OUTPATIENT
Start: 2023-08-01

## 2023-08-03 RX ORDER — LEVOTHYROXINE SODIUM 0.1 MG/1
100 TABLET ORAL DAILY
Qty: 30 TABLET | Refills: 3 | Status: SHIPPED | OUTPATIENT
Start: 2023-08-03

## 2023-08-28 RX ORDER — LEVOTHYROXINE SODIUM 0.1 MG/1
100 TABLET ORAL DAILY
Qty: 30 TABLET | Refills: 3 | Status: SHIPPED | OUTPATIENT
Start: 2023-08-28

## 2023-08-31 ENCOUNTER — TELEMEDICINE (OUTPATIENT)
Dept: PSYCHIATRY | Facility: CLINIC | Age: 53
End: 2023-08-31
Payer: COMMERCIAL

## 2023-08-31 DIAGNOSIS — F33.1 MODERATE EPISODE OF RECURRENT MAJOR DEPRESSIVE DISORDER: Primary | ICD-10-CM
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
## 2023-09-08 ENCOUNTER — TELEPHONE (OUTPATIENT)
Dept: FAMILY MEDICINE CLINIC | Facility: CLINIC | Age: 53
End: 2023-09-08
Payer: COMMERCIAL

## 2023-09-08 NOTE — TELEPHONE ENCOUNTER
Caller: Jimena Welch    Relationship to patient: Self    Best call back number: [phone]    Patient is needing: PATIENT CALLED AND SAID SHE'S BEEN GOING THROUGH VIRTUAL THERAPY WITH [facility] AND THEY HAD MENTIONED HER DOING GENETIC TESTING BUT DID NOT FOLLOW UP WITH HER ON SCHEDULING ANYTHING    SHE IS WANTING TO KNOW IF THIS IS SOMETHING JUSTEN BELTRAN WILL NEED TO ORDER OR IF SHE WILL NEED TO HAVE PSYCHIATRY ORDER THE TESTING    SHE DOES HAVE AN APPOINTMENT WITH A PSYCHIATRIST NEXT WEEK

## 2023-09-14 ENCOUNTER — TELEMEDICINE (OUTPATIENT)
Dept: PSYCHIATRY | Facility: CLINIC | Age: 53
End: 2023-09-14
Payer: COMMERCIAL

## 2023-09-14 DIAGNOSIS — G47.01 INSOMNIA DUE TO MEDICAL CONDITION: Primary | ICD-10-CM

## 2023-09-14 DIAGNOSIS — F41.9 ANXIETY DISORDER, UNSPECIFIED TYPE: ICD-10-CM

## 2023-09-14 PROCEDURE — 90792 PSYCH DIAG EVAL W/MED SRVCS: CPT | Performed by: NURSE PRACTITIONER

## 2023-09-14 RX ORDER — DARIDOREXANT 50 MG/1
50 TABLET, FILM COATED ORAL NIGHTLY
Qty: 30 TABLET | Refills: 2 | Status: SHIPPED | OUTPATIENT
Start: 2023-09-14

## 2023-09-14 NOTE — PROGRESS NOTES
Patient Name: Jimena Welch  MRN: [id number]   :  1970     Referring Physician: Carolin Calero APRN    This provider is located in her home office through the [facility] (through [facility]), [address], [address] using a secure [employer] Video Visit through [employer]. Patient is being seen remotely via telehealth at their home address in [place], and stated they are in a secure environment for this session. The patient's condition being diagnosed/treated is appropriate for telemedicine. The provider identified herself as well as her credentials.   The patient, and/or patients guardian, consent to be seen remotely, and when consent is given they understand that the consent allows for patient identifiable information to be sent to a third party as needed.   They may refuse to be seen remotely at any time. The electronic data is encrypted and password protected, and the patient and/or guardian has been advised of the potential risks to privacy not withstanding such measures.    You have chosen to receive care through a telehealth visit.  Do you consent to use a video/audio connection for your medical care today? Yes    Chief Complaint:     ICD-10-CM ICD-9-CM   1. Insomnia due to medical condition  G47.01 327.01   2. Anxiety disorder, unspecified type  F41.9 300.00       HPI:   Jimena Welch is a 53 y.o. female who is here today for initial evaluation of Anxiety  and Insomnia .  Patient states her primary care provider started her on Pristiq.  Patient states she is doing a little bit better but not where she needs to be.  Her biggest issue right now is severe insomnia.  Has used over-the-counter medications and just feels sedated the next day.  Usually goes to bed about 9 PM however most nights does not go to sleep until.  Cannot get anything completed at home.  Everything is piling up all at once.  Has tried trazodone as well as Ambien as  prescriptions.    Past Medical History:   Past Medical History:   Diagnosis Date    Allergic     Anxiety     Arthritis     Asthma     Depression     Hyperlipidemia     Obesity     Urinary tract infection        Past Surgical History:   Past Surgical History:   Procedure Laterality Date    BLADDER SURGERY      CHOLECYSTECTOMY      COLONOSCOPY      HYSTERECTOMY      NASAL SEPTUM SURGERY      2019    SINUS SURGERY      THYROIDECTOMY      WISDOM TOOTH EXTRACTION         Social History:   Social History     Socioeconomic History    Marital status:    Tobacco Use    Smoking status: Never    Smokeless tobacco: Never   Vaping Use    Vaping Use: Never used   Substance and Sexual Activity    Alcohol use: Not Currently     Comment: rarely    Drug use: No    Sexual activity: Yes     Partners: Male     Birth control/protection: Post-menopausal, None       Family History:  Family History   Problem Relation Age of Onset    Heart disease Father     Thyroid disease Father     Depression Father     Asthma Mother     Cancer Mother     Thyroid disease Mother     Arthritis Mother     Depression Mother     Cancer Paternal Grandfather        Allergy:  No Known Allergies    Current Medications:   Current Outpatient Medications   Medication Sig Dispense Refill    levothyroxine (SYNTHROID, LEVOTHROID) 100 MCG tablet TAKE 1 TABLET BY MOUTH DAILY 30 tablet 3    albuterol sulfate  (90 Base) MCG/ACT inhaler       atorvastatin (LIPITOR) 10 MG tablet TAKE ONE TABLET BY MOUTH DAILY 90 tablet 4    azelastine (ASTELIN) 0.1 % nasal spray       Daridorexant HCl (Quviviq) 50 MG tablet Take 1 tablet by mouth Every Night. 30 tablet 2    desvenlafaxine (Pristiq) 50 MG 24 hr tablet Take 1 tablet by mouth Daily. 30 tablet 5    Estrogel 0.75 MG/1.25 GM (0.06%) topical gel       fluticasone (FLONASE) 50 MCG/ACT nasal spray into the nostril(s) as directed by provider.      Fluticasone Furoate 50 MCG/ACT aerosol powder  Inhale 1 puff Daily. Rinse  mouth after each use. 30 each 5    hydrOXYzine (ATARAX) 10 MG tablet Take 1 tablet by mouth Every Night.      meloxicam (MOBIC) 15 MG tablet Take 1 tablet by mouth Daily.      Mirabegron ER (MYRBETRIQ) 50 MG tablet sustained-release 24 hour 24 hr tablet Take 50 mg by mouth Daily.      omeprazole (priLOSEC) 20 MG capsule Take 1 capsule by mouth Daily.      Prasterone 6.5 MG insert Insert 1 suppository into the vagina Daily.      Progesterone (PROMETRIUM) 100 MG capsule        No current facility-administered medications for this visit.       Lab Results:   Office Visit on 08/01/2023   Component Date Value Ref Range Status    Glucose 08/01/2023 101 (H)  65 - 99 mg/dL Final    BUN 08/01/2023 19  6 - 20 mg/dL Final    Creatinine 08/01/2023 0.76  0.57 - 1.00 mg/dL Final    Sodium 08/01/2023 140  136 - 145 mmol/L Final    Potassium 08/01/2023 4.3  3.5 - 5.2 mmol/L Final    Chloride 08/01/2023 103  98 - 107 mmol/L Final    CO2 08/01/2023 23.8  22.0 - 29.0 mmol/L Final    Calcium 08/01/2023 9.5  8.6 - 10.5 mg/dL Final    Total Protein 08/01/2023 6.8  6.0 - 8.5 g/dL Final    Albumin 08/01/2023 4.1  3.5 - 5.2 g/dL Final    ALT (SGPT) 08/01/2023 21  1 - 33 U/L Final    AST (SGOT) 08/01/2023 16  1 - 32 U/L Final    Alkaline Phosphatase 08/01/2023 89  39 - 117 U/L Final    Total Bilirubin 08/01/2023 0.4  0.0 - 1.2 mg/dL Final    Globulin 08/01/2023 2.7  gm/dL Final    A/G Ratio 08/01/2023 1.5  g/dL Final    BUN/Creatinine Ratio 08/01/2023 25.0  7.0 - 25.0 Final    Anion Gap 08/01/2023 13.2  5.0 - 15.0 mmol/L Final    eGFR 08/01/2023 92.7  >60.0 mL/min/1.73 Final    Total Cholesterol 08/01/2023 192  0 - 200 mg/dL Final    Triglycerides 08/01/2023 175 (H)  0 - 150 mg/dL Final    HDL Cholesterol 08/01/2023 46  40 - 60 mg/dL Final    LDL Cholesterol  08/01/2023 115 (H)  0 - 100 mg/dL Final    VLDL Cholesterol 08/01/2023 31  5 - 40 mg/dL Final    LDL/HDL Ratio 08/01/2023 2.41   Final    TSH 08/01/2023 1.810  0.270 - 4.200 uIU/mL  Final    WBC 08/01/2023 6.10  3.40 - 10.80 10*3/mm3 Final    RBC 08/01/2023 4.75  3.77 - 5.28 10*6/mm3 Final    Hemoglobin 08/01/2023 13.5  12.0 - 15.9 g/dL Final    Hematocrit 08/01/2023 40.4  34.0 - 46.6 % Final    MCV 08/01/2023 85.1  79.0 - 97.0 fL Final    MCH 08/01/2023 28.4  26.6 - 33.0 pg Final    MCHC 08/01/2023 33.4  31.5 - 35.7 g/dL Final    RDW 08/01/2023 13.2  12.3 - 15.4 % Final    RDW-SD 08/01/2023 40.4  37.0 - 54.0 fl Final    MPV 08/01/2023 9.4  6.0 - 12.0 fL Final    Platelets 08/01/2023 244  140 - 450 10*3/mm3 Final    Neutrophil % 08/01/2023 53.6  42.7 - 76.0 % Final    Lymphocyte % 08/01/2023 37.2  19.6 - 45.3 % Final    Monocyte % 08/01/2023 8.4  5.0 - 12.0 % Final    Eosinophil % 08/01/2023 0.0 (L)  0.3 - 6.2 % Final    Basophil % 08/01/2023 0.8  0.0 - 1.5 % Final    Immature Grans % 08/01/2023 0.0  0.0 - 0.5 % Final    Neutrophils, Absolute 08/01/2023 3.27  1.70 - 7.00 10*3/mm3 Final    Lymphocytes, Absolute 08/01/2023 2.27  0.70 - 3.10 10*3/mm3 Final    Monocytes, Absolute 08/01/2023 0.51  0.10 - 0.90 10*3/mm3 Final    Eosinophils, Absolute 08/01/2023 0.00  0.00 - 0.40 10*3/mm3 Final    Basophils, Absolute 08/01/2023 0.05  0.00 - 0.20 10*3/mm3 Final    Immature Grans, Absolute 08/01/2023 0.00  0.00 - 0.05 10*3/mm3 Final    nRBC 08/01/2023 0.0  0.0 - 0.2 /100 WBC Final       Review of Symptoms:   Review of Systems   Constitutional:  Negative for activity change, appetite change, fatigue, unexpected weight gain and unexpected weight loss.   Respiratory:  Negative for shortness of breath and wheezing.    Gastrointestinal:  Negative for constipation, diarrhea, nausea and vomiting.   Musculoskeletal:  Negative for gait problem.   Skin:  Negative for dry skin and rash.   Neurological:  Negative for dizziness, speech difficulty, weakness, light-headedness, headache, memory problem and confusion.   Psychiatric/Behavioral:  Positive for sleep disturbance and stress. Negative for agitation, behavioral  problems, decreased concentration, dysphoric mood, hallucinations, self-injury, suicidal ideas, negative for hyperactivity and depressed mood. The patient is nervous/anxious.      Physical Exam:   Physical Exam  Constitutional:       General: She is not in acute distress.     Appearance: She is well-developed. She is not diaphoretic.   HENT:      Head: Normocephalic and atraumatic.   Eyes:      Conjunctiva/sclera: Conjunctivae normal.   Pulmonary:      Effort: Pulmonary effort is normal. No respiratory distress.   Musculoskeletal:         General: Normal range of motion.      Cervical back: Full passive range of motion without pain and normal range of motion.   Neurological:      Mental Status: She is alert and oriented to person, place, and time.   Psychiatric:         Mood and Affect: Mood is anxious. Mood is not depressed. Affect is not labile, blunt, angry or inappropriate.         Speech: Speech is not rapid and pressured or tangential.         Behavior: Behavior normal. Behavior is not agitated, slowed, aggressive, withdrawn, hyperactive or combative. Behavior is cooperative.         Thought Content: Thought content normal. Thought content is not paranoid or delusional. Thought content does not include homicidal or suicidal ideation. Thought content does not include homicidal or suicidal plan.         Judgment: Judgment normal.     not currently breastfeeding.  There is no height or weight on file to calculate BMI. Video appt unable to obtain.     Mental Status Exam:   Appearance: appropriate  Hygiene:   good  Cooperation:  Cooperative  Eye Contact:  Good  Psychomotor Behavior:  Appropriate  Mood:anxious  Affect:  Appropriate  Hopelessness: Denies  Speech:  Normal  Thought Process:  Goal directed  Thought Content:  Normal  Suicidal:  None  Homicidal:  None  Hallucinations:  None  Delusion:  None  Memory:  Intact  Orientation:  Person, Place, Time, and Situation  Reliability:  good  Insight:  Good  Judgement:   Good  Impulse Control:  Good    PHQ-9 Depression Screening  Little interest or pleasure in doing things?     Feeling down, depressed, or hopeless?     Trouble falling or staying asleep, or sleeping too much?     Feeling tired or having little energy?     Poor appetite or overeating?     Feeling bad about yourself - or that you are a failure or have let yourself or your family down?     Trouble concentrating on things, such as reading the newspaper or watching television?     Moving or speaking so slowly that other people could have noticed? Or the opposite - being so fidgety or restless that you have been moving around a lot more than usual?     Thoughts that you would be better off dead, or of hurting yourself in some way?     PHQ-9 Total Score     If you checked off any problems, how difficult have these problems made it for you to do your work, take care of things at home, or get along with other people?        Assessment/Plan:   Diagnoses and all orders for this visit:    1. Insomnia due to medical condition (Primary)  -     Daridorexant HCl (Quviviq) 50 MG tablet; Take 1 tablet by mouth Every Night.  Dispense: 30 tablet; Refill: 2    2. Anxiety disorder, unspecified type    Patient feels her biggest issue is sleep.  We will start Quviviq 50 mg at bedtime.  We will follow-up in a month.    A psychological evaluation was conducted in order to assess past and current level of functioning. Areas assessed included, but were not limited to: perception of social support, perception of ability to face and deal with challenges in life (positive functioning), anxiety symptoms, depressive symptoms, perspective on beliefs/belief system, coping skills for stress, intelligence level,  Therapeutic rapport was established. Interventions conducted today were geared towards incorporating medication management along with support for continued therapy. Education was also provided as to the med management with this provider and  what to expect in subsequent sessions.    We discussed risks, benefits,goals and side effects of the above medication and the patient was agreeable with the plan.Patient was educated on the importance of compliance with treatment and follow-up appointments. Patient is aware to contact the [facility] [phone] with any worsening of symptoms. To call for questions or concerns and return early if necessary. Patent is agreeable to go to the Emergency Department or call 911 should they begin SI/HI.     Part of this note may be an electronic transcription/translation of spoken language to printed text using the Dragon Dictation System.    Return in about 4 weeks (around 10/12/2023) for Follow Up 30 min, Video visit.    Rebecca Barksdale, APRN

## 2023-09-22 ENCOUNTER — TELEMEDICINE (OUTPATIENT)
Dept: PSYCHIATRY | Facility: CLINIC | Age: 53
End: 2023-09-22
Payer: COMMERCIAL

## 2023-09-22 DIAGNOSIS — F33.1 MODERATE EPISODE OF RECURRENT MAJOR DEPRESSIVE DISORDER: Primary | ICD-10-CM

## 2023-09-22 NOTE — PROGRESS NOTES
Date: September 22, 2023  Time In: 10:05  Time Out: 10:59    This provider is located at home address in connection with the [facility] (through [facility]), [address], [place] [address] using a secure Gen[employer] Video Visit through [employer]. Patient is being seen remotely via telehealth at home address in [place] and stated they are in a secure environment for this session. The patient's condition being diagnosed/treated is appropriate for telemedicine. The provider identified himself as well as his credentials. The patient, and/or patients guardian, consent to be seen remotely, and when consent is given they understand that the consent allows for patient identifiable information to be sent to a third party as needed. They may refuse to be seen remotely at any time. The electronic data is encrypted and password protected, and the patient and/or guardian has been advised of the potential risks to privacy not withstanding such measures.  You have chosen to receive care through a telehealth visit.  Do you consent to use a video/audio connection for your medical care today? Yes    PROGRESS NOTE  Data:  Jimena Welch is a 53 y.o. female who presents today for follow up    Chief Complaint: depression    History of Present Illness: Patient reports ongoing stressors related to managing physical symptoms, difficulty with sleep, and at times concerns about closeness with . Patient reports depression has been worse lately.     Clinical Maneuvering/Intervention:  Assisted patient in processing above session content; acknowledged and normalized patient’s thoughts, feelings, and concerns.  Rationalized patient thought process regarding growing together vs. Growing Apart .  Discussed triggers associated with patient's depression.  Also discussed coping skills for patient to implement such as gratitude list..    Allowed patient to freely discuss issues without interruption or  judgment. Provided safe, confidential environment to facilitate the development of positive therapeutic relationship and encourage open, honest communication. Assisted patient in identifying risk factors which would indicate the need for higher level of care including thoughts to harm self or others and/or self-harming behavior and encouraged patient to contact this office, call 911, or present to the nearest emergency room should any of these events occur. Discussed crisis intervention services and means to access. Patient adamantly and convincingly denies current suicidal or homicidal ideation or perceptual disturbance.    Assessment:   Assessment   Patient appears to maintain relative stability as compared to their baseline.  However, patient continues to struggle with depression which continues to cause impairment in important areas of functioning.  As a result, they can be reasonably expected to continue to benefit from treatment and would likely be at increased risk for decompensation otherwise.    Mental Status Exam:   Hygiene:   good  Cooperation:  Cooperative  Eye Contact:  Good  Psychomotor Behavior:  Appropriate  Affect:  Blunted  Mood: depressed  Speech:  Normal  Thought Process:  Goal directed  Thought Content:  Normal  Suicidal:  None  Homicidal:  None  Hallucinations:  None  Delusion:  None  Memory:  Intact  Orientation:  Grossly intact  Reliability:  good  Insight:  Fair  Judgement:  Fair  Impulse Control:  Fair  Physical/Medical Issues:  Yes menopause        Patient's Support Network Includes:      Functional Status: Moderate impairment     Progress toward goal: Not at goal    Prognosis: Fair with Ongoing Treatment          Plan:    Patient will continue in individual outpatient therapy with focus on improved functioning and coping skills, maintaining stability, and avoiding decompensation and the need for higher level of care.    Patient will adhere to medication regimen as prescribed and  report any side effects. Patient will contact this office, call 911 or present to the nearest emergency room should suicidal or homicidal ideations occur. Provide Cognitive Behavioral Therapy and Solution Focused Therapy to improve functioning, maintain stability, and avoid decompensation and the need for higher level of care.     Return in about 1 month or earlier if symptoms worsen or fail to improve.           VISIT DIAGNOSIS:     ICD-10-CM ICD-9-CM   1. Moderate episode of recurrent major depressive disorder  F33.1 296.32        [facility] No Show Policy:  We understand unexpected circumstances arise; however, anytime you miss your appointment we are unable to provide you appropriate care.  In addition, each appointment missed could have been used to provide care for others.  We ask that you call at least 24 hours in advance to cancel or reschedule an appointment.  We would like to take this opportunity to remind you of our policy stating patients who miss THREE or more appointments without cancelling or rescheduling 24 hours in advance of the appointment may be subject to cancellation of any further visits with our clinic and recommendation to seek in-person services/visits.    Please call [phone] to reschedule your appointment. If there are reasons that make it difficult for you to keep the appointments, please call and let us know how we can help.  Please understand that medication prescribing will not continue without seeing your provider.      [facility]'s No Show Policy reviewed with patient at today's visit. Patient verbalized understanding of this policy. Discussed with patient that in the event that there are three or more no show visits, it will be recommended that they pursue in-person services/visits as noncompliance with telehealth visits indicates that patient is not an appropriate candidate for telemedicine and would likely be more appropriate  for in-person services/visits. Patient verbalizes understanding and is agreeable to this.         This document has been electronically signed by Francis Ferreira LCSW  October 3, 2023 23:29 EDT     Part of this note may be an electronic transcription/translation of spoken language to printed text using the Dragon Dictation System.

## 2023-09-26 ENCOUNTER — TELEMEDICINE (OUTPATIENT)
Dept: PSYCHIATRY | Facility: CLINIC | Age: 53
End: 2023-09-26
Payer: COMMERCIAL

## 2023-09-26 NOTE — PROGRESS NOTES
Date: September 26, 2023  Time In: 12:02  Time Out: 12:08    This provider is located at home address in connection with the [facility] (through [facility]), [address], [place] [address] using a secure [employer] Video Visit through [employer]. Patient is being seen remotely via telehealth at home address in [place] and stated they are in a secure environment for this session. The patient's condition being diagnosed/treated is appropriate for telemedicine. The provider identified himself as well as his credentials. The patient, and/or patients guardian, consent to be seen remotely, and when consent is given they understand that the consent allows for patient identifiable information to be sent to a third party as needed. They may refuse to be seen remotely at any time. The electronic data is encrypted and password protected, and the patient and/or guardian has been advised of the potential risks to privacy not withstanding such measures.  You have chosen to receive care through a telehealth visit.  Do you consent to use a video/audio connection for your medical care today? Yes    PROGRESS NOTE  Data:  Jimena Welch is a 53 y.o. female who presents today for follow up    Note Summary: Patient arrived on time for appointment, however, reports currently distracted by unexpected projects at work.  Met briefly with patient and decided to end the session early with plan to meet next Tuesday for follow-up.    Assessment:   Assessment   Patient appears to maintain relative stability as compared to their baseline.  However, patient continues to struggle with depression which continues to cause impairment in important areas of functioning.  As a result, they can be reasonably expected to continue to benefit from treatment and would likely be at increased risk for decompensation otherwise.    Functional Status: Moderate impairment     Progress toward goal: Not at goal    Prognosis:  Fair with Ongoing Treatment          Plan:    Patient will continue in individual outpatient therapy with focus on improved functioning and coping skills, maintaining stability, and avoiding decompensation and the need for higher level of care.    Patient will adhere to medication regimen as prescribed and report any side effects. Patient will contact this office, call 911 or present to the nearest emergency room should suicidal or homicidal ideations occur. Provide Cognitive Behavioral Therapy and Solution Focused Therapy to improve functioning, maintain stability, and avoid decompensation and the need for higher level of care.     Return in about 1 week, or earlier if symptoms worsen or fail to improve.         [facility] No Show Policy:  We understand unexpected circumstances arise; however, anytime you miss your appointment we are unable to provide you appropriate care.  In addition, each appointment missed could have been used to provide care for others.  We ask that you call at least 24 hours in advance to cancel or reschedule an appointment.  We would like to take this opportunity to remind you of our policy stating patients who miss THREE or more appointments without cancelling or rescheduling 24 hours in advance of the appointment may be subject to cancellation of any further visits with our clinic and recommendation to seek in-person services/visits.    Please call [phone] to reschedule your appointment. If there are reasons that make it difficult for you to keep the appointments, please call and let us know how we can help.  Please understand that medication prescribing will not continue without seeing your provider.      [facility]'s No Show Policy reviewed with patient at today's visit. Patient verbalized understanding of this policy. Discussed with patient that in the event that there are three or more no show visits, it will be recommended that they  pursue in-person services/visits as noncompliance with telehealth visits indicates that patient is not an appropriate candidate for telemedicine and would likely be more appropriate for in-person services/visits. Patient verbalizes understanding and is agreeable to this.         This document has been electronically signed by Francis Ferreira LCSW  September 26, 2023 12:15 EDT     Part of this note may be an electronic transcription/translation of spoken language to printed text using the Dragon Dictation System.
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
## 2023-10-02 ENCOUNTER — TELEPHONE (OUTPATIENT)
Dept: FAMILY MEDICINE CLINIC | Facility: CLINIC | Age: 53
End: 2023-10-02
Payer: COMMERCIAL

## 2023-10-02 RX ORDER — METHYLPREDNISOLONE 4 MG/1
TABLET ORAL
Qty: 21 TABLET | Refills: 0 | Status: SHIPPED | OUTPATIENT
Start: 2023-10-02

## 2023-10-02 NOTE — TELEPHONE ENCOUNTER
Caller: Jimena Welch    Relationship to patient: Self    Best call back number: [phone]     Date of exposure: UNKNOWN    Date of positive COVID19 test: 10/02/23 - SYMPTOMS STARTED ON 09/29/23    COVID19 symptoms: SHORTNESS OF BREATH    Date of initial quarantine: 10/02/23    Additional information or concerns: PATIENT HAS ASTHMA AND IS WORRIED DUE TO THE SHORTNESS OF BREATH CAUSED BY COVID    What is the patients preferred pharmacy: [facility]/pharmacy #[id number] - [address] [phone] [facility] [phone]  [phone]

## 2023-10-02 NOTE — TELEPHONE ENCOUNTER
Patient notified and states she feels this way when she gets to coughing and her chest is sore. She is afraid it's going to turn into something bad. She doesn't want it to get to that point. She is getting choked on her phlegm. If we could send in a steroid that could help.

## 2023-10-02 NOTE — TELEPHONE ENCOUNTER
If patient is having extreme shortness of air she needs to go to the ER. I want her drinking plenty of water. Take short frequent walks and work on deep breathing exercises. Is she wheezing at all? If she is I could call in a steroid.

## 2023-10-16 ENCOUNTER — TELEMEDICINE (OUTPATIENT)
Dept: PSYCHIATRY | Facility: CLINIC | Age: 53
End: 2023-10-16
Payer: COMMERCIAL

## 2023-10-16 DIAGNOSIS — F41.9 ANXIETY DISORDER, UNSPECIFIED TYPE: ICD-10-CM

## 2023-10-16 DIAGNOSIS — G47.01 INSOMNIA DUE TO MEDICAL CONDITION: Primary | ICD-10-CM

## 2023-10-16 DIAGNOSIS — F33.1 MODERATE EPISODE OF RECURRENT MAJOR DEPRESSIVE DISORDER: ICD-10-CM

## 2023-10-16 PROCEDURE — 99214 OFFICE O/P EST MOD 30 MIN: CPT | Performed by: NURSE PRACTITIONER

## 2023-10-16 RX ORDER — TEMAZEPAM 30 MG/1
30 CAPSULE ORAL NIGHTLY PRN
Qty: 30 CAPSULE | Refills: 1 | Status: SHIPPED | OUTPATIENT
Start: 2023-10-16

## 2023-10-16 NOTE — PROGRESS NOTES
Patient Name: Jimena Welch  MRN: [id number]   :  1970     This provider is located at her home office through the [facility]), [address], [address] using a secure [employer] Video Visit through [employer]. Patient is being seen remotely via telehealth at their home address in [place], and stated they are in a secure environment for this session. The patient's condition being diagnosed/treated is appropriate for telemedicine. The provider identified herself as well as her credentials.   The patient, and/or patients guardian, consent to be seen remotely, and when consent is given they understand that the consent allows for patient identifiable information to be sent to a third party as needed.   They may refuse to be seen remotely at any time. The electronic data is encrypted and password protected, and the patient and/or guardian has been advised of the potential risks to privacy not withstanding such measures.    You have chosen to receive care through a telehealth visit.  Do you consent to use a video/audio connection for your medical care today? Yes    Chief Complaint:      ICD-10-CM ICD-9-CM   1. Insomnia due to medical condition  G47.01 327.01   2. Moderate episode of recurrent major depressive disorder  F33.1 296.32   3. Anxiety disorder, unspecified type  F41.9 300.00       History of Present Illness: Jimena Welch is a 53 y.o. female is here today for medication management follow up.  Quviviq was not effective.  Patient states it still took her several hours to fall asleep.  Patient is getting over COVID right now.  Has to go to work today.    The following portions of the patient's history were reviewed and updated as appropriate: allergies, current medications, past family history, past medical history, past social history, past surgical history, and problem list.    Review of Systems;;  Review of Systems   Constitutional:   Negative for activity change, appetite change, fatigue, unexpected weight gain and unexpected weight loss.   Respiratory:  Negative for shortness of breath and wheezing.    Gastrointestinal:  Negative for constipation, diarrhea, nausea and vomiting.   Musculoskeletal:  Negative for gait problem.   Skin:  Negative for dry skin and rash.   Neurological:  Negative for dizziness, speech difficulty, weakness, light-headedness, headache, memory problem and confusion.   Psychiatric/Behavioral:  Negative for agitation, behavioral problems, decreased concentration, dysphoric mood, hallucinations, self-injury, sleep disturbance, suicidal ideas, negative for hyperactivity, depressed mood and stress. The patient is not nervous/anxious.        Physical Exam;;  Physical Exam  Constitutional:       General: She is not in acute distress.     Appearance: She is well-developed. She is not diaphoretic.   HENT:      Head: Normocephalic and atraumatic.   Eyes:      Conjunctiva/sclera: Conjunctivae normal.   Pulmonary:      Effort: Pulmonary effort is normal. No respiratory distress.   Musculoskeletal:         General: Normal range of motion.      Cervical back: Full passive range of motion without pain and normal range of motion.   Neurological:      Mental Status: She is alert and oriented to person, place, and time.   Psychiatric:         Mood and Affect: Mood is not anxious or depressed. Affect is not labile, blunt, angry or inappropriate.         Speech: Speech is not rapid and pressured or tangential.         Behavior: Behavior normal. Behavior is not agitated, slowed, aggressive, withdrawn, hyperactive or combative. Behavior is cooperative.         Thought Content: Thought content normal. Thought content is not paranoid or delusional. Thought content does not include homicidal or suicidal ideation. Thought content does not include homicidal or suicidal plan.         Judgment: Judgment normal.       not currently  breastfeeding.  There is no height or weight on file to calculate BMI.    Current Medications;;    Current Outpatient Medications:     levothyroxine (SYNTHROID, LEVOTHROID) 100 MCG tablet, TAKE 1 TABLET BY MOUTH DAILY, Disp: 30 tablet, Rfl: 3    albuterol sulfate  (90 Base) MCG/ACT inhaler, , Disp: , Rfl:     atorvastatin (LIPITOR) 10 MG tablet, TAKE ONE TABLET BY MOUTH DAILY, Disp: 90 tablet, Rfl: 4    azelastine (ASTELIN) 0.1 % nasal spray, , Disp: , Rfl:     Daridorexant HCl (Quviviq) 50 MG tablet, Take 1 tablet by mouth Every Night., Disp: 30 tablet, Rfl: 2    desvenlafaxine (Pristiq) 50 MG 24 hr tablet, Take 1 tablet by mouth Daily., Disp: 30 tablet, Rfl: 5    Estrogel 0.75 MG/1.25 GM (0.06%) topical gel, , Disp: , Rfl:     fluticasone (FLONASE) 50 MCG/ACT nasal spray, into the nostril(s) as directed by provider., Disp: , Rfl:     Fluticasone Furoate 50 MCG/ACT aerosol powder , Inhale 1 puff Daily. Rinse mouth after each use., Disp: 30 each, Rfl: 5    hydrOXYzine (ATARAX) 10 MG tablet, Take 1 tablet by mouth Every Night., Disp: , Rfl:     meloxicam (MOBIC) 15 MG tablet, Take 1 tablet by mouth Daily., Disp: , Rfl:     methylPREDNISolone (MEDROL) 4 MG dose pack, Take as directed on package instructions., Disp: 21 tablet, Rfl: 0    Mirabegron ER (MYRBETRIQ) 50 MG tablet sustained-release 24 hour 24 hr tablet, Take 50 mg by mouth Daily., Disp: , Rfl:     omeprazole (priLOSEC) 20 MG capsule, Take 1 capsule by mouth Daily., Disp: , Rfl:     Prasterone 6.5 MG insert, Insert 1 suppository into the vagina Daily., Disp: , Rfl:     Progesterone (PROMETRIUM) 100 MG capsule, , Disp: , Rfl:     temazepam (Restoril) 30 MG capsule, Take 1 capsule by mouth At Night As Needed for Sleep., Disp: 30 capsule, Rfl: 1    Lab Results:   Office Visit on 08/01/2023   Component Date Value Ref Range Status    Glucose 08/01/2023 101 (H)  65 - 99 mg/dL Final    BUN 08/01/2023 19  6 - 20 mg/dL Final    Creatinine 08/01/2023 0.76  0.57  - 1.00 mg/dL Final    Sodium 08/01/2023 140  136 - 145 mmol/L Final    Potassium 08/01/2023 4.3  3.5 - 5.2 mmol/L Final    Chloride 08/01/2023 103  98 - 107 mmol/L Final    CO2 08/01/2023 23.8  22.0 - 29.0 mmol/L Final    Calcium 08/01/2023 9.5  8.6 - 10.5 mg/dL Final    Total Protein 08/01/2023 6.8  6.0 - 8.5 g/dL Final    Albumin 08/01/2023 4.1  3.5 - 5.2 g/dL Final    ALT (SGPT) 08/01/2023 21  1 - 33 U/L Final    AST (SGOT) 08/01/2023 16  1 - 32 U/L Final    Alkaline Phosphatase 08/01/2023 89  39 - 117 U/L Final    Total Bilirubin 08/01/2023 0.4  0.0 - 1.2 mg/dL Final    Globulin 08/01/2023 2.7  gm/dL Final    A/G Ratio 08/01/2023 1.5  g/dL Final    BUN/Creatinine Ratio 08/01/2023 25.0  7.0 - 25.0 Final    Anion Gap 08/01/2023 13.2  5.0 - 15.0 mmol/L Final    eGFR 08/01/2023 92.7  >60.0 mL/min/1.73 Final    Total Cholesterol 08/01/2023 192  0 - 200 mg/dL Final    Triglycerides 08/01/2023 175 (H)  0 - 150 mg/dL Final    HDL Cholesterol 08/01/2023 46  40 - 60 mg/dL Final    LDL Cholesterol  08/01/2023 115 (H)  0 - 100 mg/dL Final    VLDL Cholesterol 08/01/2023 31  5 - 40 mg/dL Final    LDL/HDL Ratio 08/01/2023 2.41   Final    TSH 08/01/2023 1.810  0.270 - 4.200 uIU/mL Final    WBC 08/01/2023 6.10  3.40 - 10.80 10*3/mm3 Final    RBC 08/01/2023 4.75  3.77 - 5.28 10*6/mm3 Final    Hemoglobin 08/01/2023 13.5  12.0 - 15.9 g/dL Final    Hematocrit 08/01/2023 40.4  34.0 - 46.6 % Final    MCV 08/01/2023 85.1  79.0 - 97.0 fL Final    MCH 08/01/2023 28.4  26.6 - 33.0 pg Final    MCHC 08/01/2023 33.4  31.5 - 35.7 g/dL Final    RDW 08/01/2023 13.2  12.3 - 15.4 % Final    RDW-SD 08/01/2023 40.4  37.0 - 54.0 fl Final    MPV 08/01/2023 9.4  6.0 - 12.0 fL Final    Platelets 08/01/2023 244  140 - 450 10*3/mm3 Final    Neutrophil % 08/01/2023 53.6  42.7 - 76.0 % Final    Lymphocyte % 08/01/2023 37.2  19.6 - 45.3 % Final    Monocyte % 08/01/2023 8.4  5.0 - 12.0 % Final    Eosinophil % 08/01/2023 0.0 (L)  0.3 - 6.2 % Final    Basophil  % 08/01/2023 0.8  0.0 - 1.5 % Final    Immature Grans % 08/01/2023 0.0  0.0 - 0.5 % Final    Neutrophils, Absolute 08/01/2023 3.27  1.70 - 7.00 10*3/mm3 Final    Lymphocytes, Absolute 08/01/2023 2.27  0.70 - 3.10 10*3/mm3 Final    Monocytes, Absolute 08/01/2023 0.51  0.10 - 0.90 10*3/mm3 Final    Eosinophils, Absolute 08/01/2023 0.00  0.00 - 0.40 10*3/mm3 Final    Basophils, Absolute 08/01/2023 0.05  0.00 - 0.20 10*3/mm3 Final    Immature Grans, Absolute 08/01/2023 0.00  0.00 - 0.05 10*3/mm3 Final    nRBC 08/01/2023 0.0  0.0 - 0.2 /100 WBC Final       Mental Status Exam:   Hygiene:   good  Cooperation:  Cooperative  Eye Contact:  Good  Psychomotor Behavior:  Appropriate  Mood:within normal limits  Affect:  Appropriate  Hopelessness: Denies  Speech:  Normal  Thought Process:  Goal directed  Thought Content:  Normal  Suicidal:  None  Homicidal:  None  Hallucinations:  None  Delusion:  None  Memory:  Intact  Orientation:  Person, Place, Time, and Situation  Reliability:  good  Insight:  Good  Judgement:  Good  Impulse Control:  Good    PHQ-9 Depression Screening  Little interest or pleasure in doing things? (P) 1-->several days   Feeling down, depressed, or hopeless? (P) 1-->several days   Trouble falling or staying asleep, or sleeping too much? (P) 3-->nearly every day   Feeling tired or having little energy? (P) 2-->more than half the days   Poor appetite or overeating? (P) 2-->more than half the days   Feeling bad about yourself - or that you are a failure or have let yourself or your family down? (P) 1-->several days   Trouble concentrating on things, such as reading the newspaper or watching television? (P) 1-->several days   Moving or speaking so slowly that other people could have noticed? Or the opposite - being so fidgety or restless that you have been moving around a lot more than usual? (P) 0-->not at all   Thoughts that you would be better off dead, or of hurting yourself in some way? (P) 0-->not at all    PHQ-9 Total Score (P) 11   If you checked off any problems, how difficult have these problems made it for you to do your work, take care of things at home, or get along with other people? (P) somewhat difficult        Assessment/Plan:  Diagnoses and all orders for this visit:    1. Insomnia due to medical condition (Primary)  -     temazepam (Restoril) 30 MG capsule; Take 1 capsule by mouth At Night As Needed for Sleep.  Dispense: 30 capsule; Refill: 1    2. Moderate episode of recurrent major depressive disorder    3. Anxiety disorder, unspecified type      Quviviq was not effective.  We will try Restoril 30 mg at night.  Genetic testing kit will be sent to the patient.    A psychological evaluation was conducted in order to assess past and current level of functioning. Areas assessed included, but were not limited to: perception of social support, perception of ability to face and deal with challenges in life (positive functioning), anxiety symptoms, depressive symptoms, perspective on beliefs/belief system, coping skills for stress, intelligence level,  Therapeutic rapport was established. Interventions conducted today were geared towards incorporating medication management along with support for continued therapy. Education was also provided as to the med management with this provider and what to expect in subsequent sessions.    We discussed risks, benefits,goals and side effects of the above medication and the patient was agreeable with the plan.Patient was educated on the importance of compliance with treatment and follow-up appointments. Patient is aware to contact the [facility] [phone] with any worsening of symptoms. To call for questions or concerns and return early if necessary. Patent is agreeable to go to the Emergency Department or call 911 should they begin SI/HI.     Part of this note may be an electronic transcription/translation of spoken language to printed  text using the Dragon Dictation System.    Return in about 4 weeks (around 11/13/2023) for Follow Up 30 min, Video visit.    Rebecca Barksdale, APRN

## 2023-10-24 ENCOUNTER — TELEMEDICINE (OUTPATIENT)
Dept: PSYCHIATRY | Facility: CLINIC | Age: 53
End: 2023-10-24
Payer: COMMERCIAL

## 2023-10-24 DIAGNOSIS — F33.1 MODERATE EPISODE OF RECURRENT MAJOR DEPRESSIVE DISORDER: Primary | ICD-10-CM

## 2023-10-24 NOTE — PROGRESS NOTES
Date: October 24, 2023  Time In: 13:45  Time Out: 14:27    This provider is located at home address in connection with the [facility] (through [facility]), [address], [place] [address] using a secure [employer] Video Visit through [employer]. Patient is being seen remotely via telehealth at home address in [place] and stated they are in a secure environment for this session. The patient's condition being diagnosed/treated is appropriate for telemedicine. The provider identified himself as well as his credentials. The patient, and/or patients guardian, consent to be seen remotely, and when consent is given they understand that the consent allows for patient identifiable information to be sent to a third party as needed. They may refuse to be seen remotely at any time. The electronic data is encrypted and password protected, and the patient and/or guardian has been advised of the potential risks to privacy not withstanding such measures.  You have chosen to receive care through a telehealth visit.  Do you consent to use a video/audio connection for your medical care today? Yes    PROGRESS NOTE  Data:  Jimena Welch is a 53 y.o. female who presents today for follow up    Chief Complaint: depression    History of Present Illness: Patient reports recent stressors with weather changes, difficulty with sleep, significant dates, and work related stress. Reports at times enjoying quiet spot overlooking the city.     Clinical Maneuvering/Intervention:  Assisted patient in processing above session content; acknowledged and normalized patient’s thoughts, feelings, and concerns.  Rationalized patient thought process regarding sleep and impact on mental health .  Discussed triggers associated with patient's depression.  Also discussed coping skills for patient to implement such as gratitude list.    Allowed patient to freely discuss issues without interruption or judgment. Provided  safe, confidential environment to facilitate the development of positive therapeutic relationship and encourage open, honest communication. Assisted patient in identifying risk factors which would indicate the need for higher level of care including thoughts to harm self or others and/or self-harming behavior and encouraged patient to contact this office, call 911, or present to the nearest emergency room should any of these events occur. Discussed crisis intervention services and means to access. Patient adamantly and convincingly denies current suicidal or homicidal ideation or perceptual disturbance.    Assessment:   Assessment   Patient appears to maintain relative stability as compared to their baseline.  However, patient continues to struggle with depression which continues to cause impairment in important areas of functioning.  As a result, they can be reasonably expected to continue to benefit from treatment and would likely be at increased risk for decompensation otherwise.    Mental Status Exam:   Hygiene:   good  Cooperation:  Cooperative  Eye Contact:  Good  Psychomotor Behavior:  Appropriate  Affect:  Blunted  Mood: depressed  Speech:  Normal  Thought Process:  Goal directed  Thought Content:  Normal  Suicidal:  None  Homicidal:  None  Hallucinations:  None  Delusion:  None  Memory:  Intact  Orientation:  Grossly intact  Reliability:  good  Insight:  Fair  Judgement:  Fair  Impulse Control:  Fair  Physical/Medical Issues:  Yes menopause        Patient's Support Network Includes:      Functional Status: Moderate impairment     Progress toward goal: Not at goal    Prognosis: Fair with Ongoing Treatment          Plan:    Patient will continue in individual outpatient therapy with focus on improved functioning and coping skills, maintaining stability, and avoiding decompensation and the need for higher level of care.    Patient will adhere to medication regimen as prescribed and report any side  effects. Patient will contact this office, call 911 or present to the nearest emergency room should suicidal or homicidal ideations occur. Provide Cognitive Behavioral Therapy and Solution Focused Therapy to improve functioning, maintain stability, and avoid decompensation and the need for higher level of care.     Return in about 1 month or earlier if symptoms worsen or fail to improve.           VISIT DIAGNOSIS:     ICD-10-CM ICD-9-CM   1. Moderate episode of recurrent major depressive disorder  F33.1 296.32          [facility] No Show Policy:  We understand unexpected circumstances arise; however, anytime you miss your appointment we are unable to provide you appropriate care.  In addition, each appointment missed could have been used to provide care for others.  We ask that you call at least 24 hours in advance to cancel or reschedule an appointment.  We would like to take this opportunity to remind you of our policy stating patients who miss THREE or more appointments without cancelling or rescheduling 24 hours in advance of the appointment may be subject to cancellation of any further visits with our clinic and recommendation to seek in-person services/visits.    Please call [phone] to reschedule your appointment. If there are reasons that make it difficult for you to keep the appointments, please call and let us know how we can help.  Please understand that medication prescribing will not continue without seeing your provider.      [facility]'s No Show Policy reviewed with patient at today's visit. Patient verbalized understanding of this policy. Discussed with patient that in the event that there are three or more no show visits, it will be recommended that they pursue in-person services/visits as noncompliance with telehealth visits indicates that patient is not an appropriate candidate for telemedicine and would likely be more appropriate for in-person  services/visits. Patient verbalizes understanding and is agreeable to this.         This document has been electronically signed by Francis Ferreira LCSW  November 29, 2023 21:28 EST     Part of this note may be an electronic transcription/translation of spoken language to printed text using the Dragon Dictation System.

## 2023-10-31 ENCOUNTER — TELEMEDICINE (OUTPATIENT)
Dept: PSYCHIATRY | Facility: CLINIC | Age: 53
End: 2023-10-31
Payer: COMMERCIAL

## 2023-10-31 DIAGNOSIS — F41.1 GENERALIZED ANXIETY DISORDER: Primary | ICD-10-CM

## 2023-10-31 NOTE — PROGRESS NOTES
Date: October 31, 2023  Time In: 10:06  Time Out: 11:10    This provider is located at home address in connection with the [facility] (through [facility]), [address], [place] [address] using a secure [employer] Video Visit through [employer]. Patient is being seen remotely via telehealth at home address in [place] and stated they are in a secure environment for this session. The patient's condition being diagnosed/treated is appropriate for telemedicine. The provider identified himself as well as his credentials. The patient, and/or patients guardian, consent to be seen remotely, and when consent is given they understand that the consent allows for patient identifiable information to be sent to a third party as needed. They may refuse to be seen remotely at any time. The electronic data is encrypted and password protected, and the patient and/or guardian has been advised of the potential risks to privacy not withstanding such measures.  You have chosen to receive care through a telehealth visit.  Do you consent to use a video/audio connection for your medical care today? Yes    PROGRESS NOTE  Data:  Jimena Welch is a 53 y.o. female who presents today for follow up    Chief Complaint: anxiety    History of Present Illness: Patient reports recent stressors related to work, difficulty with sleep, and personality differences with . Reports some sense of having to handle many situations on own and at times feeling like not supported in such.     Clinical Maneuvering/Intervention:  Assisted patient in processing above session content; acknowledged and normalized patient’s thoughts, feelings, and concerns.  Rationalized patient thought process regarding sense of lacking support .  Discussed triggers associated with patient's anxiety.  Also discussed coping skills for patient to implement such as gratitude list..    Allowed patient to freely discuss issues without  interruption or judgment. Provided safe, confidential environment to facilitate the development of positive therapeutic relationship and encourage open, honest communication. Assisted patient in identifying risk factors which would indicate the need for higher level of care including thoughts to harm self or others and/or self-harming behavior and encouraged patient to contact this office, call 911, or present to the nearest emergency room should any of these events occur. Discussed crisis intervention services and means to access. Patient adamantly and convincingly denies current suicidal or homicidal ideation or perceptual disturbance.    Assessment:   Assessment   Patient appears to maintain relative stability as compared to their baseline.  However, patient continues to struggle with anxiety which continues to cause impairment in important areas of functioning.  As a result, they can be reasonably expected to continue to benefit from treatment and would likely be at increased risk for decompensation otherwise.    Mental Status Exam:   Hygiene:   good  Cooperation:  Cooperative  Eye Contact:  Good  Psychomotor Behavior:  Appropriate  Affect:  Blunted  Mood: normal  Speech:  Normal  Thought Process:  Goal directed  Thought Content:  Normal  Suicidal:  None  Homicidal:  None  Hallucinations:  None  Delusion:  None  Memory:  Intact  Orientation:  Grossly intact  Reliability:  good  Insight:  Fair  Judgement:  Fair  Impulse Control:  Fair  Physical/Medical Issues:  Yes menopause        Patient's Support Network Includes:      Functional Status: Moderate impairment     Progress toward goal: Not at goal    Prognosis: Fair with Ongoing Treatment          Plan:    Patient will continue in individual outpatient therapy with focus on improved functioning and coping skills, maintaining stability, and avoiding decompensation and the need for higher level of care.    Patient will adhere to medication regimen as  prescribed and report any side effects. Patient will contact this office, call 911 or present to the nearest emergency room should suicidal or homicidal ideations occur. Provide Cognitive Behavioral Therapy and Solution Focused Therapy to improve functioning, maintain stability, and avoid decompensation and the need for higher level of care.              VISIT DIAGNOSIS:     ICD-10-CM ICD-9-CM   1. Generalized anxiety disorder  F41.1 300.02          [facility] No Show Policy:  We understand unexpected circumstances arise; however, anytime you miss your appointment we are unable to provide you appropriate care.  In addition, each appointment missed could have been used to provide care for others.  We ask that you call at least 24 hours in advance to cancel or reschedule an appointment.  We would like to take this opportunity to remind you of our policy stating patients who miss THREE or more appointments without cancelling or rescheduling 24 hours in advance of the appointment may be subject to cancellation of any further visits with our clinic and recommendation to seek in-person services/visits.    Please call [phone] to reschedule your appointment. If there are reasons that make it difficult for you to keep the appointments, please call and let us know how we can help.  Please understand that medication prescribing will not continue without seeing your provider.      [facility]'s No Show Policy reviewed with patient at today's visit. Patient verbalized understanding of this policy. Discussed with patient that in the event that there are three or more no show visits, it will be recommended that they pursue in-person services/visits as noncompliance with telehealth visits indicates that patient is not an appropriate candidate for telemedicine and would likely be more appropriate for in-person services/visits. Patient verbalizes understanding and is agreeable to  this.         This document has been electronically signed by Francis Ferreira LCSW  December 1, 2023 16:07 EST     Part of this note may be an electronic transcription/translation of spoken language to printed text using the Dragon Dictation System.

## 2023-11-15 ENCOUNTER — TELEMEDICINE (OUTPATIENT)
Dept: PSYCHIATRY | Facility: CLINIC | Age: 53
End: 2023-11-15
Payer: COMMERCIAL

## 2023-11-15 DIAGNOSIS — G47.01 INSOMNIA DUE TO MEDICAL CONDITION: ICD-10-CM

## 2023-11-15 DIAGNOSIS — F41.9 ANXIETY DISORDER, UNSPECIFIED TYPE: Primary | ICD-10-CM

## 2023-11-15 PROCEDURE — 99214 OFFICE O/P EST MOD 30 MIN: CPT | Performed by: NURSE PRACTITIONER

## 2023-11-15 RX ORDER — DIAZEPAM 5 MG/1
5 TABLET ORAL EVERY 8 HOURS PRN
Qty: 30 TABLET | Refills: 0 | Status: SHIPPED | OUTPATIENT
Start: 2023-11-15 | End: 2024-11-14

## 2023-11-15 NOTE — PROGRESS NOTES
Patient Name: Jimena Welch  MRN: [id number]   :  1970     This provider is located at her home office through the [facility] (through [facility]), [address], [address] using a secure EN[employer] Video Visit through [employer]. Patient is being seen remotely via telehealth at their home address in [place], and stated they are in a secure environment for this session. The patient's condition being diagnosed/treated is appropriate for telemedicine. The provider identified herself as well as her credentials.   The patient, and/or patients guardian, consent to be seen remotely, and when consent is given they understand that the consent allows for patient identifiable information to be sent to a third party as needed.   They may refuse to be seen remotely at any time. The electronic data is encrypted and password protected, and the patient and/or guardian has been advised of the potential risks to privacy not withstanding such measures.    You have chosen to receive care through a telehealth visit.  Do you consent to use a video/audio connection for your medical care today? Yes    Chief Complaint:      ICD-10-CM ICD-9-CM   1. Anxiety disorder, unspecified type  F41.9 300.00       History of Present Illness: Jimena Welch is a 53 y.o. female is here today for medication management follow up.  Patient frustrated she is still having trouble with insomnia.  She has decreased sugar and caffeine.  Did reschedule her sleep apnea appointment.  Patient doing what she can to help with sleep hygiene.  Her room is dark.  She uses a white noise machine.  Tries not to be on her phone when it is bedtime.    The following portions of the patient's history were reviewed and updated as appropriate: allergies, current medications, past family history, past medical history, past social history, past surgical history, and problem list.    Review of Systems;;  Review of Systems    Constitutional:  Negative for activity change, appetite change, fatigue, unexpected weight gain and unexpected weight loss.   Respiratory:  Negative for shortness of breath and wheezing.    Gastrointestinal:  Negative for constipation, diarrhea, nausea and vomiting.   Musculoskeletal:  Negative for gait problem.   Skin:  Negative for dry skin and rash.   Neurological:  Negative for dizziness, speech difficulty, weakness, light-headedness, headache, memory problem and confusion.   Psychiatric/Behavioral:  Positive for agitation, sleep disturbance and stress. Negative for behavioral problems, decreased concentration, dysphoric mood, hallucinations, self-injury, suicidal ideas, negative for hyperactivity and depressed mood. The patient is not nervous/anxious.        Physical Exam;;  Physical Exam  Constitutional:       General: She is not in acute distress.     Appearance: She is well-developed. She is not diaphoretic.   HENT:      Head: Normocephalic and atraumatic.   Eyes:      Conjunctiva/sclera: Conjunctivae normal.   Pulmonary:      Effort: Pulmonary effort is normal. No respiratory distress.   Musculoskeletal:         General: Normal range of motion.      Cervical back: Full passive range of motion without pain and normal range of motion.   Neurological:      Mental Status: She is alert and oriented to person, place, and time.   Psychiatric:         Mood and Affect: Mood is not anxious or depressed. Affect is not labile, blunt, angry or inappropriate.         Speech: Speech is not rapid and pressured or tangential.         Behavior: Behavior normal. Behavior is not agitated, slowed, aggressive, withdrawn, hyperactive or combative. Behavior is cooperative.         Thought Content: Thought content normal. Thought content is not paranoid or delusional. Thought content does not include homicidal or suicidal ideation. Thought content does not include homicidal or suicidal plan.         Judgment: Judgment normal.        not currently breastfeeding.  There is no height or weight on file to calculate BMI.Video appt unable to obtain.     Current Medications;;    Current Outpatient Medications:     levothyroxine (SYNTHROID, LEVOTHROID) 100 MCG tablet, TAKE 1 TABLET BY MOUTH DAILY, Disp: 30 tablet, Rfl: 3    albuterol sulfate  (90 Base) MCG/ACT inhaler, , Disp: , Rfl:     atorvastatin (LIPITOR) 10 MG tablet, TAKE ONE TABLET BY MOUTH DAILY, Disp: 90 tablet, Rfl: 4    azelastine (ASTELIN) 0.1 % nasal spray, , Disp: , Rfl:     desvenlafaxine (Pristiq) 50 MG 24 hr tablet, Take 1 tablet by mouth Daily., Disp: 30 tablet, Rfl: 5    diazePAM (Valium) 5 MG tablet, Take 1 tablet by mouth Every 8 (Eight) Hours As Needed for Anxiety or Sleep., Disp: 30 tablet, Rfl: 0    Estrogel 0.75 MG/1.25 GM (0.06%) topical gel, , Disp: , Rfl:     fluticasone (FLONASE) 50 MCG/ACT nasal spray, into the nostril(s) as directed by provider., Disp: , Rfl:     Fluticasone Furoate 50 MCG/ACT aerosol powder , Inhale 1 puff Daily. Rinse mouth after each use., Disp: 30 each, Rfl: 5    hydrOXYzine (ATARAX) 10 MG tablet, Take 1 tablet by mouth Every Night., Disp: , Rfl:     meloxicam (MOBIC) 15 MG tablet, Take 1 tablet by mouth Daily., Disp: , Rfl:     methylPREDNISolone (MEDROL) 4 MG dose pack, Take as directed on package instructions., Disp: 21 tablet, Rfl: 0    Mirabegron ER (MYRBETRIQ) 50 MG tablet sustained-release 24 hour 24 hr tablet, Take 50 mg by mouth Daily., Disp: , Rfl:     omeprazole (priLOSEC) 20 MG capsule, Take 1 capsule by mouth Daily., Disp: , Rfl:     Prasterone 6.5 MG insert, Insert 1 suppository into the vagina Daily., Disp: , Rfl:     Progesterone (PROMETRIUM) 100 MG capsule, , Disp: , Rfl:     Lab Results:   No visits with results within 3 Month(s) from this visit.   Latest known visit with results is:   Office Visit on 08/01/2023   Component Date Value Ref Range Status    Glucose 08/01/2023 101 (H)  65 - 99 mg/dL Final    BUN 08/01/2023  19  6 - 20 mg/dL Final    Creatinine 08/01/2023 0.76  0.57 - 1.00 mg/dL Final    Sodium 08/01/2023 140  136 - 145 mmol/L Final    Potassium 08/01/2023 4.3  3.5 - 5.2 mmol/L Final    Chloride 08/01/2023 103  98 - 107 mmol/L Final    CO2 08/01/2023 23.8  22.0 - 29.0 mmol/L Final    Calcium 08/01/2023 9.5  8.6 - 10.5 mg/dL Final    Total Protein 08/01/2023 6.8  6.0 - 8.5 g/dL Final    Albumin 08/01/2023 4.1  3.5 - 5.2 g/dL Final    ALT (SGPT) 08/01/2023 21  1 - 33 U/L Final    AST (SGOT) 08/01/2023 16  1 - 32 U/L Final    Alkaline Phosphatase 08/01/2023 89  39 - 117 U/L Final    Total Bilirubin 08/01/2023 0.4  0.0 - 1.2 mg/dL Final    Globulin 08/01/2023 2.7  gm/dL Final    A/G Ratio 08/01/2023 1.5  g/dL Final    BUN/Creatinine Ratio 08/01/2023 25.0  7.0 - 25.0 Final    Anion Gap 08/01/2023 13.2  5.0 - 15.0 mmol/L Final    eGFR 08/01/2023 92.7  >60.0 mL/min/1.73 Final    Total Cholesterol 08/01/2023 192  0 - 200 mg/dL Final    Triglycerides 08/01/2023 175 (H)  0 - 150 mg/dL Final    HDL Cholesterol 08/01/2023 46  40 - 60 mg/dL Final    LDL Cholesterol  08/01/2023 115 (H)  0 - 100 mg/dL Final    VLDL Cholesterol 08/01/2023 31  5 - 40 mg/dL Final    LDL/HDL Ratio 08/01/2023 2.41   Final    TSH 08/01/2023 1.810  0.270 - 4.200 uIU/mL Final    WBC 08/01/2023 6.10  3.40 - 10.80 10*3/mm3 Final    RBC 08/01/2023 4.75  3.77 - 5.28 10*6/mm3 Final    Hemoglobin 08/01/2023 13.5  12.0 - 15.9 g/dL Final    Hematocrit 08/01/2023 40.4  34.0 - 46.6 % Final    MCV 08/01/2023 85.1  79.0 - 97.0 fL Final    MCH 08/01/2023 28.4  26.6 - 33.0 pg Final    MCHC 08/01/2023 33.4  31.5 - 35.7 g/dL Final    RDW 08/01/2023 13.2  12.3 - 15.4 % Final    RDW-SD 08/01/2023 40.4  37.0 - 54.0 fl Final    MPV 08/01/2023 9.4  6.0 - 12.0 fL Final    Platelets 08/01/2023 244  140 - 450 10*3/mm3 Final    Neutrophil % 08/01/2023 53.6  42.7 - 76.0 % Final    Lymphocyte % 08/01/2023 37.2  19.6 - 45.3 % Final    Monocyte % 08/01/2023 8.4  5.0 - 12.0 % Final     Eosinophil % 08/01/2023 0.0 (L)  0.3 - 6.2 % Final    Basophil % 08/01/2023 0.8  0.0 - 1.5 % Final    Immature Grans % 08/01/2023 0.0  0.0 - 0.5 % Final    Neutrophils, Absolute 08/01/2023 3.27  1.70 - 7.00 10*3/mm3 Final    Lymphocytes, Absolute 08/01/2023 2.27  0.70 - 3.10 10*3/mm3 Final    Monocytes, Absolute 08/01/2023 0.51  0.10 - 0.90 10*3/mm3 Final    Eosinophils, Absolute 08/01/2023 0.00  0.00 - 0.40 10*3/mm3 Final    Basophils, Absolute 08/01/2023 0.05  0.00 - 0.20 10*3/mm3 Final    Immature Grans, Absolute 08/01/2023 0.00  0.00 - 0.05 10*3/mm3 Final    nRBC 08/01/2023 0.0  0.0 - 0.2 /100 WBC Final       Mental Status Exam:   Hygiene:   good  Cooperation:  Cooperative  Eye Contact:  Good  Psychomotor Behavior:  Appropriate  Mood:irritable  Affect:  Appropriate  Hopelessness: Denies  Speech:  Normal  Thought Process:  Goal directed  Thought Content:  Normal  Suicidal:  None  Homicidal:  None  Hallucinations:  None  Delusion:  None  Memory:  Intact  Orientation:  Person, Place, Time, and Situation  Reliability:  good  Insight:  Good  Judgement:  Good  Impulse Control:  Good    PHQ-9 Depression Screening  Little interest or pleasure in doing things?     Feeling down, depressed, or hopeless?     Trouble falling or staying asleep, or sleeping too much?     Feeling tired or having little energy?     Poor appetite or overeating?     Feeling bad about yourself - or that you are a failure or have let yourself or your family down?     Trouble concentrating on things, such as reading the newspaper or watching television?     Moving or speaking so slowly that other people could have noticed? Or the opposite - being so fidgety or restless that you have been moving around a lot more than usual?     Thoughts that you would be better off dead, or of hurting yourself in some way?     PHQ-9 Total Score     If you checked off any problems, how difficult have these problems made it for you to do your work, take care of things  at home, or get along with other people?          Assessment/Plan:  Diagnoses and all orders for this visit:    1. Anxiety disorder, unspecified type (Primary)  -     diazePAM (Valium) 5 MG tablet; Take 1 tablet by mouth Every 8 (Eight) Hours As Needed for Anxiety or Sleep.  Dispense: 30 tablet; Refill: 0    2. Insomnia due to medical condition      Patient is still struggling significantly with insomnia.  Has rescheduled her sleep apnea appointment.  We will trial Valium 5 mg at bedtime to see if this will help with sleep.    A psychological evaluation was conducted in order to assess past and current level of functioning. Areas assessed included, but were not limited to: perception of social support, perception of ability to face and deal with challenges in life (positive functioning), anxiety symptoms, depressive symptoms, perspective on beliefs/belief system, coping skills for stress, intelligence level,  Therapeutic rapport was established. Interventions conducted today were geared towards incorporating medication management along with support for continued therapy. Education was also provided as to the med management with this provider and what to expect in subsequent sessions.    We discussed risks, benefits,goals and side effects of the above medication and the patient was agreeable with the plan.Patient was educated on the importance of compliance with treatment and follow-up appointments. Patient is aware to contact the [facility] [phone] with any worsening of symptoms. To call for questions or concerns and return early if necessary. Patent is agreeable to go to the Emergency Department or call 911 should they begin SI/HI.     Part of this note may be an electronic transcription/translation of spoken language to printed text using the Dragon Dictation System.    Return in about 4 weeks (around 12/13/2023) for Follow Up 30 min, Video visit.    Rebecca Barksdale, HIGINIO

## 2023-11-16 ENCOUNTER — TELEMEDICINE (OUTPATIENT)
Dept: PSYCHIATRY | Facility: CLINIC | Age: 53
End: 2023-11-16
Payer: COMMERCIAL

## 2023-11-16 DIAGNOSIS — F41.1 GENERALIZED ANXIETY DISORDER: Primary | ICD-10-CM

## 2023-11-16 NOTE — PROGRESS NOTES
Date: November 16, 2023  Time In: 12:04  Time Out: 12:46    This provider is located at home address in connection with the [facility] (through [facility]), [address], [place] [address] using a secure [email or web address] Video Visit through [employer]. Patient is being seen remotely via telehealth at home address in [place] and stated they are in a secure environment for this session. The patient's condition being diagnosed/treated is appropriate for telemedicine. The provider identified himself as well as his credentials. The patient, and/or patients guardian, consent to be seen remotely, and when consent is given they understand that the consent allows for patient identifiable information to be sent to a third party as needed. They may refuse to be seen remotely at any time. The electronic data is encrypted and password protected, and the patient and/or guardian has been advised of the potential risks to privacy not withstanding such measures.  You have chosen to receive care through a telehealth visit.  Do you consent to use a video/audio connection for your medical care today? Yes    PROGRESS NOTE  Data:  Jimena Welch is a 53 y.o. female who presents today for follow up    Chief Complaint: anxiety    History of Present Illness: Patient reports ongoing stressors related to sleep challenges, work schedule changing, and medications. Reports ongoing frustration with sleep difficulties and difficulty with concentration at times. Continuing to try different medications to aid sleep.     Clinical Maneuvering/Intervention:  Assisted patient in processing above session content; acknowledged and normalized patient’s thoughts, feelings, and concerns.  Rationalized patient thought process regarding trying to ground, especially to aid with sleep.  Discussed triggers associated with patient's anxiety.  Discussed possible use of calm/safe place with hopes to attempt such in future session.  Also discussed coping skills for patient to implement such as gratitude list..    Allowed patient to freely discuss issues without interruption or judgment. Provided safe, confidential environment to facilitate the development of positive therapeutic relationship and encourage open, honest communication. Assisted patient in identifying risk factors which would indicate the need for higher level of care including thoughts to harm self or others and/or self-harming behavior and encouraged patient to contact this office, call 911, or present to the nearest emergency room should any of these events occur. Discussed crisis intervention services and means to access. Patient adamantly and convincingly denies current suicidal or homicidal ideation or perceptual disturbance.    Assessment:   Assessment   Patient appears to maintain relative stability as compared to their baseline.  However, patient continues to struggle with anxiety which continues to cause impairment in important areas of functioning.  As a result, they can be reasonably expected to continue to benefit from treatment and would likely be at increased risk for decompensation otherwise.    Mental Status Exam:   Hygiene:   good  Cooperation:  Cooperative  Eye Contact:  Good  Psychomotor Behavior:  Appropriate  Affect:  Blunted  Mood: depressed  Speech:  Normal  Thought Process:  Goal directed  Thought Content:  Normal  Suicidal:  None  Homicidal:  None  Hallucinations:  None  Delusion:  None  Memory:  Intact  Orientation:  Grossly intact  Reliability:  good  Insight:  Fair  Judgement:  Fair  Impulse Control:  Fair  Physical/Medical Issues:  Yes menopause        Patient's Support Network Includes:      Functional Status: Moderate impairment     Progress toward goal: Not at goal    Prognosis: Fair with Ongoing Treatment          Plan:    Patient will continue in individual outpatient therapy with focus on improved functioning and coping skills, maintaining  stability, and avoiding decompensation and the need for higher level of care.    Patient will adhere to medication regimen as prescribed and report any side effects. Patient will contact this office, call 911 or present to the nearest emergency room should suicidal or homicidal ideations occur. Provide Cognitive Behavioral Therapy and Solution Focused Therapy to improve functioning, maintain stability, and avoid decompensation and the need for higher level of care.     Return in about 2 weeks or earlier if symptoms worsen or fail to improve.           VISIT DIAGNOSIS:     ICD-10-CM ICD-9-CM   1. Generalized anxiety disorder  F41.1 300.02          [facility] No Show Policy:  We understand unexpected circumstances arise; however, anytime you miss your appointment we are unable to provide you appropriate care.  In addition, each appointment missed could have been used to provide care for others.  We ask that you call at least 24 hours in advance to cancel or reschedule an appointment.  We would like to take this opportunity to remind you of our policy stating patients who miss THREE or more appointments without cancelling or rescheduling 24 hours in advance of the appointment may be subject to cancellation of any further visits with our clinic and recommendation to seek in-person services/visits.    Please call [phone] to reschedule your appointment. If there are reasons that make it difficult for you to keep the appointments, please call and let us know how we can help.  Please understand that medication prescribing will not continue without seeing your provider.      [facility]'s No Show Policy reviewed with patient at today's visit. Patient verbalized understanding of this policy. Discussed with patient that in the event that there are three or more no show visits, it will be recommended that they pursue in-person services/visits as noncompliance with telehealth  visits indicates that patient is not an appropriate candidate for telemedicine and would likely be more appropriate for in-person services/visits. Patient verbalizes understanding and is agreeable to this.         This document has been electronically signed by Francis Ferreira LCSW  November 16, 2023 21:00 EST     Part of this note may be an electronic transcription/translation of spoken language to printed text using the Dragon Dictation System.
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
## 2023-12-01 NOTE — PSYCHOTHERAPY NOTE
Know what walking into today, good time to throw everything away, came home shivering, boss' boss barely got wet, this is not okay to ask employees to do    Everything thought would frost is fine here    Then she told me to chill    She has daddy complex with [employer]    Can't even get to dumter, trying to get this stuff done that you've asked    Where is the we?    It's very disrespectful, chill as trigger    You don't tell me how to handle things, sometimes you just can't that upset    Bringing me the carts     Almost walked    Didn't need to throw all of that away    She's going through pissing everybody off, so davidnikki    Will continue to do my job, allowed 20 hours right now, used to that    Talking to me like a child    Know how to stop, think about what I'm doing, this is just one day    If she disrespects me in front of someone else, have to be straightforward    Trying to have a little fun, he's trying to spend time with sister, don't understand why I'm here    He's such a routine person, they are so tight, anybody marrying in feel like outsiders    Sometimes just don't want to make effort to join group    Terrified what will happen when kids leave the roost    I'm not getting a divorce again, too old to deal with that shit    Used to wonder why mom stayed together, too exhausting to do something different, we'll just stay     Started feeling naturally tired    Have to reschedule sleep apnea    That's probably why waking up, planner, thinker, what we can do next    Doesn't help that 17 year old that likes to stay out, doesn't go to sleep until 130    Can't stop her because this is probably going to be schedule she keeps moving forward, old enough to decide     Sense and hear everything, ear plugs in, then own little world of thinking    What chapter did I miss    Music is great    Really into tv show, but really tired    Don't know how to shut it down    This morning, knew I had 10    If I'm up by  "930, can be ready in 10-15, help the animals, do questionnaires, make coffee     to someone very structured, gets up same time    Still have my spontaneity, all my structure/planning to work around his mindset     If I have energy to do something, just going to go do it    If asking to help me    If I want to go outside of the planning it's just me, the house is not just mine    Feel almost like expected to do everything    Son always helped    Daughter becoming very structured just like     Son has the spontaneity    Don't get help unless asking, begging, throw a tantrum    Brain going to structure for him, already in my head of what I need to do, have to do it every day    Don't understand why he can't see that, he makes the money, doesn't do anything physical     Tried handing off some of this    He's , his brother controller, sister , sister was , international     Mentality of intelligence in his family    Feel like should wear the dunce hat with them     He has personality, but flat line expressions    Only sports, computer games, videogames     I am emotional roller coaster, my feelings do not stop    With children have to be more of a spontaneos person    I can't be myself, want to be goofball, fun me, when I do that, they cut me down    Can't joke at home    Like won't let me be me    Don't do that mom, don't act like that mom    \"What is wrong with you?\" Look    Had to put passions into everything else    People don't get my sarcasm, take it like mean/rude    Animals have unconditional love    Can be vicious, words can cut, never really had to spank    Mom saying only wanted one child, and I'm the second kid, that stuck    Trying to keep things stable with second kiddo to college  "

## 2023-12-07 ENCOUNTER — OFFICE VISIT (OUTPATIENT)
Dept: FAMILY MEDICINE CLINIC | Facility: CLINIC | Age: 53
End: 2023-12-07
Payer: COMMERCIAL

## 2023-12-07 VITALS
HEART RATE: 84 BPM | DIASTOLIC BLOOD PRESSURE: 74 MMHG | BODY MASS INDEX: 36.53 KG/M2 | OXYGEN SATURATION: 97 % | HEIGHT: 68 IN | WEIGHT: 241 LBS | TEMPERATURE: 98 F | SYSTOLIC BLOOD PRESSURE: 137 MMHG

## 2023-12-07 DIAGNOSIS — E03.9 HYPOTHYROIDISM, UNSPECIFIED TYPE: ICD-10-CM

## 2023-12-07 DIAGNOSIS — E78.5 HYPERLIPIDEMIA, UNSPECIFIED HYPERLIPIDEMIA TYPE: ICD-10-CM

## 2023-12-07 DIAGNOSIS — F32.A ANXIETY AND DEPRESSION: ICD-10-CM

## 2023-12-07 DIAGNOSIS — Z12.31 ENCOUNTER FOR SCREENING MAMMOGRAM FOR MALIGNANT NEOPLASM OF BREAST: ICD-10-CM

## 2023-12-07 DIAGNOSIS — F41.9 ANXIETY AND DEPRESSION: ICD-10-CM

## 2023-12-07 DIAGNOSIS — Z00.00 PREVENTATIVE HEALTH CARE: Primary | ICD-10-CM

## 2023-12-07 DIAGNOSIS — G47.33 OSA (OBSTRUCTIVE SLEEP APNEA): ICD-10-CM

## 2023-12-07 RX ORDER — PAROXETINE HYDROCHLORIDE 20 MG/1
20 TABLET, FILM COATED ORAL EVERY MORNING
Qty: 30 TABLET | Refills: 0 | Status: SHIPPED | OUTPATIENT
Start: 2023-12-07

## 2023-12-07 RX ORDER — DIAZEPAM 5 MG/1
5 TABLET ORAL NIGHTLY
COMMUNITY

## 2023-12-07 NOTE — PROGRESS NOTES
Subjective     Jimena Welch is a 53 y.o. female.     History of Present Illness  Pt is here today for her annual CPE.  She works at a plant [employer]  She is not getting much exercise in.  She is trying to eat a well balanced diet  She does not smoke  Drinks on rare occasion.      Hypothyroid-  Had her thyroid removed.  Had nodules when she was 11.    She is currently taking synthroid 100 mcg. She is doing well on the medication at this time.  She does not see endocrinology.      Interstitial Cystitis-  Sees urologynecology- Abby Rosario.  She is retiring so she will now be seeing a gynercologist and then a separate urologist. Takes pyridium as needed.  Watches her diet. She is doing ok at this time. She had surgery on 12/23/22 - cystohydro.      Hormone replacement- is on prasterone insert.  She is also on estradiol gel sees Dr. Lezama. She is doing ok with those at this time.      GERD- takes 20mg omeprazole occasionally.  It is controlled fine.     Anxiety- she is currently on pristiq 50mg daily. Pt states originally she was doing well. She states that within an hour of taking it she will start developing sweating and hot flashes. She used to take zoloft. She is currently in therapy and seeing psych.  She is having trouble sleeping and they are trying some different meds. Has tries quivivic, temazipam, ambien, and valium.  She is wanting to stop the pristiq and start paxil. Denies SI or HI     Hyperlipidemia- Takes 10mg lipitor. She is tolerating it well.       Sleep apnea- does not use a CPAP. She states she has been having trouble sleeping. She sees ENT. She had a sleep study that showed her sleep apnea wasn't as bad. She is going to check with her dentist about a mouth guard.     IBS- she is taking a probiotic. It is helping.  Had colonoscopy in 2018 and everything was fine.     RA/Hand pain- she is now seeing rheumatology. She is on meloxicam. She has RA. She is stable, does have some pain intermittently    "  Labs- UTD  Pap smear- UTD had hysterectomy- doesn't need paps- sees GYN  Mammogram-  1/16/23  DEXA-n/a  Colonoscopy- cologuard 1/5/22     Vaccines:  Flu-  UTD  PNA- n/a  Shingles- due  Tdap- UTD     Dental exam- due  Eye exam- utd            The following portions of the patient's history were reviewed and updated as appropriate: allergies, current medications, past family history, past medical history, past social history, past surgical history, and problem list.    Review of Systems   Constitutional:  Negative for appetite change, chills, fatigue and fever.   HENT:  Negative for congestion, ear pain, hearing loss, postnasal drip, rhinorrhea, sinus pressure, sore throat, swollen glands and trouble swallowing.    Eyes:  Positive for itching. Negative for blurred vision, double vision, pain, discharge and visual disturbance.   Respiratory:  Negative for cough, chest tightness, shortness of breath and wheezing.    Cardiovascular:  Negative for chest pain and palpitations.   Gastrointestinal:  Negative for abdominal pain, blood in stool, constipation, diarrhea, nausea and vomiting.   Endocrine: Negative for polydipsia, polyphagia and polyuria.   Genitourinary:  Positive for pelvic pain (pelvic pressure). Negative for dysuria, flank pain, frequency, urgency, vaginal bleeding, vaginal discharge and vaginal pain.   Musculoskeletal:  Negative for arthralgias, back pain and myalgias.   Skin:  Negative for rash and skin lesions.   Neurological:  Negative for dizziness, weakness, numbness and headache.   Psychiatric/Behavioral:  Positive for depressed mood and stress. Negative for self-injury and suicidal ideas. The patient is nervous/anxious.        Objective     /74 (BP Location: Left arm, Patient Position: Sitting, Cuff Size: Large Adult)   Pulse 84   Temp 98 °F (36.7 °C) (Tympanic)   Ht 172.7 cm (68\")   Wt 109 kg (241 lb)   SpO2 97%   BMI 36.64 kg/m²     Current Outpatient Medications on File Prior to Visit "   Medication Sig Dispense Refill    levothyroxine (SYNTHROID, LEVOTHROID) 100 MCG tablet TAKE 1 TABLET BY MOUTH DAILY 30 tablet 3    albuterol sulfate  (90 Base) MCG/ACT inhaler       atorvastatin (LIPITOR) 10 MG tablet TAKE ONE TABLET BY MOUTH DAILY 90 tablet 4    azelastine (ASTELIN) 0.1 % nasal spray       diazePAM (VALIUM) 5 MG tablet Take 1 tablet by mouth Every Night. 1 as needed for anxiety and sleep      Estrogel 0.75 MG/1.25 GM (0.06%) topical gel       fluticasone (FLONASE) 50 MCG/ACT nasal spray into the nostril(s) as directed by provider.      Fluticasone Furoate 50 MCG/ACT aerosol powder  Inhale 1 puff Daily. Rinse mouth after each use. 30 each 5    meloxicam (MOBIC) 15 MG tablet Take 1 tablet by mouth Daily.      omeprazole (priLOSEC) 20 MG capsule Take 1 capsule by mouth Daily.      Prasterone 6.5 MG insert Insert 1 suppository into the vagina Daily.      [DISCONTINUED] desvenlafaxine (Pristiq) 50 MG 24 hr tablet Take 1 tablet by mouth Daily. 30 tablet 5    [DISCONTINUED] diazePAM (Valium) 5 MG tablet Take 1 tablet by mouth Every 8 (Eight) Hours As Needed for Anxiety or Sleep. 30 tablet 0    [DISCONTINUED] hydrOXYzine (ATARAX) 10 MG tablet Take 1 tablet by mouth Every Night.      [DISCONTINUED] methylPREDNISolone (MEDROL) 4 MG dose pack Take as directed on package instructions. 21 tablet 0    [DISCONTINUED] Mirabegron ER (MYRBETRIQ) 50 MG tablet sustained-release 24 hour 24 hr tablet Take 50 mg by mouth Daily.      [DISCONTINUED] Progesterone (PROMETRIUM) 100 MG capsule        No current facility-administered medications on file prior to visit.        Class 2 Severe Obesity (BMI >=35 and <=39.9). Obesity-related health conditions include the following: obstructive sleep apnea. Obesity is unchanged. BMI is is above average; BMI management plan is completed. We discussed portion control and increasing exercise.       Physical Exam  Vitals reviewed.   Constitutional:       General: She is not in  acute distress.     Appearance: Normal appearance. She is well-developed. She is not diaphoretic.   HENT:      Head: Normocephalic and atraumatic.      Right Ear: Tympanic membrane and ear canal normal.      Left Ear: Tympanic membrane and ear canal normal.      Nose: No congestion or rhinorrhea.      Mouth/Throat:      Pharynx: No oropharyngeal exudate or posterior oropharyngeal erythema.   Eyes:      General:         Right eye: No discharge.         Left eye: No discharge.      Extraocular Movements: Extraocular movements intact.      Conjunctiva/sclera: Conjunctivae normal.   Cardiovascular:      Rate and Rhythm: Normal rate and regular rhythm.      Heart sounds: No murmur heard.  Pulmonary:      Effort: Pulmonary effort is normal. No respiratory distress.      Breath sounds: Normal breath sounds. No wheezing or rales.   Abdominal:      General: Bowel sounds are normal.      Palpations: Abdomen is soft.   Musculoskeletal:         General: Normal range of motion.      Cervical back: Normal range of motion.   Skin:     General: Skin is warm and dry.   Neurological:      General: No focal deficit present.      Mental Status: She is alert and oriented to person, place, and time.   Psychiatric:         Mood and Affect: Mood normal.         Behavior: Behavior normal.         Thought Content: Thought content normal.         Judgment: Judgment normal.           Assessment & Plan     Diagnoses and all orders for this visit:    1. Preventative health care (Primary)  Comments:  work on diet and exercise  labs UTD  get mammo in Jan  sees GYN  vaccines UTD    2. Hypothyroidism, unspecified type  Comments:  stable  cont levothyroxine  check TSH at next visit    3. JACINTO (obstructive sleep apnea)  Comments:  stable  not using CPAP  sees ENT    4. Anxiety and depression  Comments:  deteriorated  not tolerating pristiq  switch to paxil  sees psych  denies SI or HI  Orders:  -     PARoxetine (Paxil) 20 MG tablet; Take 1 tablet by  mouth Every Morning.  Dispense: 30 tablet; Refill: 0    5. Encounter for screening mammogram for malignant neoplasm of breast  -     Mammo Screening Digital Tomosynthesis Bilateral With CAD; Future    6. Hyperlipidemia, unspecified hyperlipidemia type  Comments:  stable  work on diet and exercise  check labs next visit

## 2023-12-12 ENCOUNTER — TELEMEDICINE (OUTPATIENT)
Dept: PSYCHIATRY | Facility: CLINIC | Age: 53
End: 2023-12-12
Payer: COMMERCIAL

## 2023-12-12 DIAGNOSIS — F33.1 MODERATE EPISODE OF RECURRENT MAJOR DEPRESSIVE DISORDER: Primary | ICD-10-CM

## 2023-12-12 PROCEDURE — 90837 PSYTX W PT 60 MINUTES: CPT | Performed by: SOCIAL WORKER
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
## 2023-12-12 NOTE — PROGRESS NOTES
Date: December 12, 2023  Time In: 13:37  Time Out: 14:40    This provider is located at home address in connection with the [facility]through [facility]), [address], [place] [address] using a secure [employer] Video Visit through [employer]. Patient is being seen remotely via telehealth at home address in [place] and stated they are in a secure environment for this session. The patient's condition being diagnosed/treated is appropriate for telemedicine. The provider identified himself as well as his credentials. The patient, and/or patients guardian, consent to be seen remotely, and when consent is given they understand that the consent allows for patient identifiable information to be sent to a third party as needed. They may refuse to be seen remotely at any time. The electronic data is encrypted and password protected, and the patient and/or guardian has been advised of the potential risks to privacy not withstanding such measures.  You have chosen to receive care through a telehealth visit.  Do you consent to use a video/audio connection for your medical care today? Yes    PROGRESS NOTE  Data:  Jimena Welch is a 53 y.o. female who presents today for follow up    Chief Complaint: depression    History of Present Illness: Patient reports ongoing stressors related to difficulty with sleep and medication changes.  Reports some improvement in mood as well as appetite suppression with recent changes.  Appreciative of ways  offers support.    Clinical Maneuvering/Intervention:  Assisted patient in processing above session content; acknowledged and normalized patient’s thoughts, feelings, and concerns.  Rationalized patient thought process regarding emotional lability versus lack of emotions.  Discussed triggers associated with patient's depression.  Also discussed possible use of love languages with .    Allowed patient to freely discuss issues without  interruption or judgment. Provided safe, confidential environment to facilitate the development of positive therapeutic relationship and encourage open, honest communication. Assisted patient in identifying risk factors which would indicate the need for higher level of care including thoughts to harm self or others and/or self-harming behavior and encouraged patient to contact this office, call 911, or present to the nearest emergency room should any of these events occur. Discussed crisis intervention services and means to access. Patient adamantly and convincingly denies current suicidal or homicidal ideation or perceptual disturbance.    Assessment:   Assessment   Patient appears to maintain relative stability as compared to their baseline.  However, patient continues to struggle with depression which continues to cause impairment in important areas of functioning.  As a result, they can be reasonably expected to continue to benefit from treatment and would likely be at increased risk for decompensation otherwise.    Mental Status Exam:   Hygiene:   good  Cooperation:  Cooperative  Eye Contact:  Good  Psychomotor Behavior:  Appropriate  Affect:  Blunted  Mood: depressed  Speech:  Normal  Thought Process:  Goal directed  Thought Content:  Normal  Suicidal:  None  Homicidal:  None  Hallucinations:  None  Delusion:  None  Memory:  Intact  Orientation:  Grossly intact  Reliability:  good  Insight:  Fair  Judgement:  Fair  Impulse Control:  Fair  Physical/Medical Issues:  Yes menopause        Patient's Support Network Includes:      Functional Status: Moderate impairment     Progress toward goal: Not at goal    Prognosis: Fair with Ongoing Treatment          Plan:    Patient will continue in individual outpatient therapy with focus on improved functioning and coping skills, maintaining stability, and avoiding decompensation and the need for higher level of care.    Patient will adhere to medication regimen as  prescribed and report any side effects. Patient will contact this office, call 911 or present to the nearest emergency room should suicidal or homicidal ideations occur. Provide Cognitive Behavioral Therapy and Solution Focused Therapy to improve functioning, maintain stability, and avoid decompensation and the need for higher level of care.     Return in about 4 weeks or earlier if symptoms worsen or fail to improve.           VISIT DIAGNOSIS:     ICD-10-CM ICD-9-CM   1. Moderate episode of recurrent major depressive disorder  F33.1 296.32            [facility] No Show Policy:  We understand unexpected circumstances arise; however, anytime you miss your appointment we are unable to provide you appropriate care.  In addition, each appointment missed could have been used to provide care for others.  We ask that you call at least 24 hours in advance to cancel or reschedule an appointment.  We would like to take this opportunity to remind you of our policy stating patients who miss THREE or more appointments without cancelling or rescheduling 24 hours in advance of the appointment may be subject to cancellation of any further visits with our clinic and recommendation to seek in-person services/visits.    Please call [phone] to reschedule your appointment. If there are reasons that make it difficult for you to keep the appointments, please call and let us know how we can help.  Please understand that medication prescribing will not continue without seeing your provider.      [facility]'s No Show Policy reviewed with patient at today's visit. Patient verbalized understanding of this policy. Discussed with patient that in the event that there are three or more no show visits, it will be recommended that they pursue in-person services/visits as noncompliance with telehealth visits indicates that patient is not an appropriate candidate for telemedicine and would likely be  more appropriate for in-person services/visits. Patient verbalizes understanding and is agreeable to this.         This document has been electronically signed by Francis Ferreira LCSW  December 12, 2023 14:54 EST     Part of this note may be an electronic transcription/translation of spoken language to printed text using the Dragon Dictation System.

## 2023-12-20 ENCOUNTER — TELEMEDICINE (OUTPATIENT)
Dept: PSYCHIATRY | Facility: CLINIC | Age: 53
End: 2023-12-20
Payer: COMMERCIAL

## 2023-12-20 DIAGNOSIS — F41.1 GENERALIZED ANXIETY DISORDER: ICD-10-CM

## 2023-12-20 DIAGNOSIS — F33.1 MODERATE EPISODE OF RECURRENT MAJOR DEPRESSIVE DISORDER: Primary | ICD-10-CM

## 2023-12-20 DIAGNOSIS — G47.01 INSOMNIA DUE TO MEDICAL CONDITION: ICD-10-CM

## 2023-12-20 PROCEDURE — 99214 OFFICE O/P EST MOD 30 MIN: CPT | Performed by: NURSE PRACTITIONER

## 2023-12-20 RX ORDER — MIRTAZAPINE 30 MG/1
30 TABLET, FILM COATED ORAL NIGHTLY
Qty: 30 TABLET | Refills: 2 | Status: SHIPPED | OUTPATIENT
Start: 2023-12-20 | End: 2024-12-19

## 2023-12-20 RX ORDER — BUPROPION HYDROCHLORIDE 150 MG/1
150 TABLET ORAL EVERY MORNING
Qty: 30 TABLET | Refills: 2 | Status: SHIPPED | OUTPATIENT
Start: 2023-12-20 | End: 2024-12-19

## 2023-12-20 NOTE — PROGRESS NOTES
Patient Name: Jimena Welch  MRN: [id number]   :  1970     This provider is located at her home office through the [facility] (through [facility]), [address], [address] using a secure [employer] Video Visit through [employer]. Patient is being seen remotely via telehealth at their home address in [place], and stated they are in a secure environment for this session. The patient's condition being diagnosed/treated is appropriate for telemedicine. The provider identified herself as well as her credentials.   The patient, and/or patients guardian, consent to be seen remotely, and when consent is given they understand that the consent allows for patient identifiable information to be sent to a third party as needed.   They may refuse to be seen remotely at any time. The electronic data is encrypted and password protected, and the patient and/or guardian has been advised of the potential risks to privacy not withstanding such measures.    You have chosen to receive care through a telehealth visit.  Do you consent to use a video/audio connection for your medical care today? Yes    Chief Complaint:      ICD-10-CM ICD-9-CM   1. Moderate episode of recurrent major depressive disorder  F33.1 296.32   2. Generalized anxiety disorder  F41.1 300.02   3. Insomnia due to medical condition  G47.01 327.01       History of Present Illness: Jimena Welch is a 53 y.o. female is here today for medication management follow up.  Patient states Valium made her very relaxed at bedtime however did not help with insomnia.  Has a friend that takes mirtazapine and would like to try this.  Still having some mood issues secondary to the insomnia.    The following portions of the patient's history were reviewed and updated as appropriate: allergies, current medications, past family history, past medical history, past social history, past surgical history, and problem list.    Review of  Systems;;  Review of Systems   Constitutional:  Negative for activity change, appetite change, fatigue, unexpected weight gain and unexpected weight loss.   Respiratory:  Negative for shortness of breath and wheezing.    Gastrointestinal:  Negative for constipation, diarrhea, nausea and vomiting.   Musculoskeletal:  Negative for gait problem.   Skin:  Negative for dry skin and rash.   Neurological:  Negative for dizziness, speech difficulty, weakness, light-headedness, headache, memory problem and confusion.   Psychiatric/Behavioral:  Positive for sleep disturbance and stress. Negative for agitation, behavioral problems, decreased concentration, dysphoric mood, hallucinations, self-injury, suicidal ideas, negative for hyperactivity and depressed mood. The patient is not nervous/anxious.        Physical Exam;;  Physical Exam  Constitutional:       General: She is not in acute distress.     Appearance: She is well-developed. She is not diaphoretic.   HENT:      Head: Normocephalic and atraumatic.   Eyes:      Conjunctiva/sclera: Conjunctivae normal.   Pulmonary:      Effort: Pulmonary effort is normal. No respiratory distress.   Musculoskeletal:         General: Normal range of motion.      Cervical back: Full passive range of motion without pain and normal range of motion.   Neurological:      Mental Status: She is alert and oriented to person, place, and time.   Psychiatric:         Mood and Affect: Mood is not anxious or depressed. Affect is not labile, blunt, angry or inappropriate.         Speech: Speech is not rapid and pressured or tangential.         Behavior: Behavior normal. Behavior is not agitated, slowed, aggressive, withdrawn, hyperactive or combative. Behavior is cooperative.         Thought Content: Thought content normal. Thought content is not paranoid or delusional. Thought content does not include homicidal or suicidal ideation. Thought content does not include homicidal or suicidal plan.          Judgment: Judgment normal.       not currently breastfeeding.  There is no height or weight on file to calculate BMI. Video appt unable to obtain.     Current Medications;;    Current Outpatient Medications:     levothyroxine (SYNTHROID, LEVOTHROID) 100 MCG tablet, TAKE 1 TABLET BY MOUTH DAILY, Disp: 30 tablet, Rfl: 3    albuterol sulfate  (90 Base) MCG/ACT inhaler, , Disp: , Rfl:     atorvastatin (LIPITOR) 10 MG tablet, TAKE ONE TABLET BY MOUTH DAILY, Disp: 90 tablet, Rfl: 4    azelastine (ASTELIN) 0.1 % nasal spray, , Disp: , Rfl:     buPROPion XL (Wellbutrin XL) 150 MG 24 hr tablet, Take 1 tablet by mouth Every Morning., Disp: 30 tablet, Rfl: 2    diazePAM (VALIUM) 5 MG tablet, Take 1 tablet by mouth Every Night. 1 as needed for anxiety and sleep, Disp: , Rfl:     Estrogel 0.75 MG/1.25 GM (0.06%) topical gel, , Disp: , Rfl:     fluticasone (FLONASE) 50 MCG/ACT nasal spray, into the nostril(s) as directed by provider., Disp: , Rfl:     Fluticasone Furoate 50 MCG/ACT aerosol powder , Inhale 1 puff Daily. Rinse mouth after each use., Disp: 30 each, Rfl: 5    meloxicam (MOBIC) 15 MG tablet, Take 1 tablet by mouth Daily., Disp: , Rfl:     mirtazapine (REMERON SOL-TAB) 30 MG disintegrating tablet, Place 1 tablet on the tongue Every Night., Disp: 30 tablet, Rfl: 2    omeprazole (priLOSEC) 20 MG capsule, Take 1 capsule by mouth Daily., Disp: , Rfl:     PARoxetine (Paxil) 20 MG tablet, Take 1 tablet by mouth Every Morning., Disp: 30 tablet, Rfl: 0    Prasterone 6.5 MG insert, Insert 1 suppository into the vagina Daily., Disp: , Rfl:     Lab Results:   No visits with results within 3 Month(s) from this visit.   Latest known visit with results is:   Office Visit on 08/01/2023   Component Date Value Ref Range Status    Glucose 08/01/2023 101 (H)  65 - 99 mg/dL Final    BUN 08/01/2023 19  6 - 20 mg/dL Final    Creatinine 08/01/2023 0.76  0.57 - 1.00 mg/dL Final    Sodium 08/01/2023 140  136 - 145 mmol/L Final     Potassium 08/01/2023 4.3  3.5 - 5.2 mmol/L Final    Chloride 08/01/2023 103  98 - 107 mmol/L Final    CO2 08/01/2023 23.8  22.0 - 29.0 mmol/L Final    Calcium 08/01/2023 9.5  8.6 - 10.5 mg/dL Final    Total Protein 08/01/2023 6.8  6.0 - 8.5 g/dL Final    Albumin 08/01/2023 4.1  3.5 - 5.2 g/dL Final    ALT (SGPT) 08/01/2023 21  1 - 33 U/L Final    AST (SGOT) 08/01/2023 16  1 - 32 U/L Final    Alkaline Phosphatase 08/01/2023 89  39 - 117 U/L Final    Total Bilirubin 08/01/2023 0.4  0.0 - 1.2 mg/dL Final    Globulin 08/01/2023 2.7  gm/dL Final    A/G Ratio 08/01/2023 1.5  g/dL Final    BUN/Creatinine Ratio 08/01/2023 25.0  7.0 - 25.0 Final    Anion Gap 08/01/2023 13.2  5.0 - 15.0 mmol/L Final    eGFR 08/01/2023 92.7  >60.0 mL/min/1.73 Final    Total Cholesterol 08/01/2023 192  0 - 200 mg/dL Final    Triglycerides 08/01/2023 175 (H)  0 - 150 mg/dL Final    HDL Cholesterol 08/01/2023 46  40 - 60 mg/dL Final    LDL Cholesterol  08/01/2023 115 (H)  0 - 100 mg/dL Final    VLDL Cholesterol 08/01/2023 31  5 - 40 mg/dL Final    LDL/HDL Ratio 08/01/2023 2.41   Final    TSH 08/01/2023 1.810  0.270 - 4.200 uIU/mL Final    WBC 08/01/2023 6.10  3.40 - 10.80 10*3/mm3 Final    RBC 08/01/2023 4.75  3.77 - 5.28 10*6/mm3 Final    Hemoglobin 08/01/2023 13.5  12.0 - 15.9 g/dL Final    Hematocrit 08/01/2023 40.4  34.0 - 46.6 % Final    MCV 08/01/2023 85.1  79.0 - 97.0 fL Final    MCH 08/01/2023 28.4  26.6 - 33.0 pg Final    MCHC 08/01/2023 33.4  31.5 - 35.7 g/dL Final    RDW 08/01/2023 13.2  12.3 - 15.4 % Final    RDW-SD 08/01/2023 40.4  37.0 - 54.0 fl Final    MPV 08/01/2023 9.4  6.0 - 12.0 fL Final    Platelets 08/01/2023 244  140 - 450 10*3/mm3 Final    Neutrophil % 08/01/2023 53.6  42.7 - 76.0 % Final    Lymphocyte % 08/01/2023 37.2  19.6 - 45.3 % Final    Monocyte % 08/01/2023 8.4  5.0 - 12.0 % Final    Eosinophil % 08/01/2023 0.0 (L)  0.3 - 6.2 % Final    Basophil % 08/01/2023 0.8  0.0 - 1.5 % Final    Immature Grans % 08/01/2023 0.0   0.0 - 0.5 % Final    Neutrophils, Absolute 08/01/2023 3.27  1.70 - 7.00 10*3/mm3 Final    Lymphocytes, Absolute 08/01/2023 2.27  0.70 - 3.10 10*3/mm3 Final    Monocytes, Absolute 08/01/2023 0.51  0.10 - 0.90 10*3/mm3 Final    Eosinophils, Absolute 08/01/2023 0.00  0.00 - 0.40 10*3/mm3 Final    Basophils, Absolute 08/01/2023 0.05  0.00 - 0.20 10*3/mm3 Final    Immature Grans, Absolute 08/01/2023 0.00  0.00 - 0.05 10*3/mm3 Final    nRBC 08/01/2023 0.0  0.0 - 0.2 /100 WBC Final       Mental Status Exam:   Hygiene:   good  Cooperation:  Cooperative  Eye Contact:  Good  Psychomotor Behavior:  Appropriate  Mood:irritable  Affect:  Appropriate  Hopelessness: Denies  Speech:  Normal  Thought Process:  Goal directed  Thought Content:  Normal  Suicidal:  None  Homicidal:  None  Hallucinations:  None  Delusion:  None  Memory:  Intact  Orientation:  Person, Place, Time, and Situation  Reliability:  good  Insight:  Good  Judgement:  Good  Impulse Control:  Good    PHQ-9 Depression Screening  Little interest or pleasure in doing things? (P) 1-->several days   Feeling down, depressed, or hopeless? (P) 1-->several days   Trouble falling or staying asleep, or sleeping too much? (P) 3-->nearly every day   Feeling tired or having little energy? (P) 2-->more than half the days   Poor appetite or overeating? (P) 0-->not at all   Feeling bad about yourself - or that you are a failure or have let yourself or your family down? (P) 1-->several days   Trouble concentrating on things, such as reading the newspaper or watching television? (P) 0-->not at all   Moving or speaking so slowly that other people could have noticed? Or the opposite - being so fidgety or restless that you have been moving around a lot more than usual? (P) 0-->not at all   Thoughts that you would be better off dead, or of hurting yourself in some way? (P) 0-->not at all   PHQ-9 Total Score (P) 8   If you checked off any problems, how difficult have these problems made  it for you to do your work, take care of things at home, or get along with other people? (P) somewhat difficult        Assessment/Plan:  Diagnoses and all orders for this visit:    1. Moderate episode of recurrent major depressive disorder (Primary)  -     buPROPion XL (Wellbutrin XL) 150 MG 24 hr tablet; Take 1 tablet by mouth Every Morning.  Dispense: 30 tablet; Refill: 2    2. Generalized anxiety disorder    3. Insomnia due to medical condition  -     mirtazapine (REMERON SOL-TAB) 30 MG disintegrating tablet; Place 1 tablet on the tongue Every Night.  Dispense: 30 tablet; Refill: 2      Patient continues to have significant insomnia.  Would like to try mirtazapine.  We will trial mirtazapine 35 mg disintegrating tablets 1 at bedtime.  We will hold Valium.  We will start Wellbutrin  mg daily.    A psychological evaluation was conducted in order to assess past and current level of functioning. Areas assessed included, but were not limited to: perception of social support, perception of ability to face and deal with challenges in life (positive functioning), anxiety symptoms, depressive symptoms, perspective on beliefs/belief system, coping skills for stress, intelligence level,  Therapeutic rapport was established. Interventions conducted today were geared towards incorporating medication management along with support for continued therapy. Education was also provided as to the med management with this provider and what to expect in subsequent sessions.    We discussed risks, benefits,goals and side effects of the above medication and the patient was agreeable with the plan.Patient was educated on the importance of compliance with treatment and follow-up appointments. Patient is aware to contact the [facility] [phone] with any worsening of symptoms. To call for questions or concerns and return early if necessary. Patent is agreeable to go to the Emergency Department or call  [phone] should they begin SI/HI.     Part of this note may be an electronic transcription/translation of spoken language to printed text using the Dragon Dictation System.    Return in about 4 weeks (around 1/17/2024) for Follow Up 30 min, Video visit.    Rebecca Barksdale, APRN

## 2024-01-01 DIAGNOSIS — F32.A ANXIETY AND DEPRESSION: ICD-10-CM

## 2024-01-01 DIAGNOSIS — F41.9 ANXIETY AND DEPRESSION: ICD-10-CM

## 2024-01-02 RX ORDER — PAROXETINE HYDROCHLORIDE 20 MG/1
20 TABLET, FILM COATED ORAL EVERY MORNING
Qty: 90 TABLET | Refills: 1 | Status: SHIPPED | OUTPATIENT
Start: 2024-01-02

## 2024-01-09 ENCOUNTER — TELEMEDICINE (OUTPATIENT)
Dept: PSYCHIATRY | Facility: CLINIC | Age: 54
End: 2024-01-09
Payer: COMMERCIAL

## 2024-01-09 DIAGNOSIS — F33.1 MODERATE EPISODE OF RECURRENT MAJOR DEPRESSIVE DISORDER: Primary | ICD-10-CM

## 2024-01-09 PROCEDURE — 90837 PSYTX W PT 60 MINUTES: CPT | Performed by: SOCIAL WORKER

## 2024-01-09 NOTE — PROGRESS NOTES
Date: January 9, 2024  Time In: 14:32  Time Out: 15:39    This provider is located at home address in connection with the [facility] (through [facility]), [address], [place] [address] using a secure ZEB[employer] Video Visit through [email or web address]. Patient is being seen remotely via telehealth at home address in [place] and stated they are in a secure environment for this session. The patient's condition being diagnosed/treated is appropriate for telemedicine. The provider identified himself as well as his credentials. The patient, and/or patients guardian, consent to be seen remotely, and when consent is given they understand that the consent allows for patient identifiable information to be sent to a third party as needed. They may refuse to be seen remotely at any time. The electronic data is encrypted and password protected, and the patient and/or guardian has been advised of the potential risks to privacy not withstanding such measures.  You have chosen to receive care through a telehealth visit.  Do you consent to use a video/audio connection for your medical care today? Yes    PROGRESS NOTE  Data:  Jimena Welch is a 53 y.o. 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 Reports some improved sense of self with change of medication and being able to be present with her family again. Reports some improvement in efforts being made in relationship.     Clinical Maneuvering/Intervention:  Assisted patient in processing above session content; acknowledged and normalized patient’s thoughts, feelings, and concerns.  Rationalized patient thought process regarding being stuck on what should've done or said.  Discussed triggers associated with patient's depression.  Also discussed possible use of SIFT acronym (Sensation, Image,  Feeling, and Thought about self) as journaling/thought exercise when stuck on rethinking scenario repeatedly.     Allowed patient to freely discuss issues without interruption or judgment. Provided safe, confidential environment to facilitate the development of positive therapeutic relationship and encourage open, honest communication. Assisted patient in identifying risk factors which would indicate the need for higher level of care including thoughts to harm self or others and/or self-harming behavior and encouraged patient to contact this office, call 911, or present to the nearest emergency room should any of these events occur. Discussed crisis intervention services and means to access. Patient adamantly and convincingly denies current suicidal or homicidal ideation or perceptual disturbance.    Assessment:   Assessment   Patient appears to maintain relative stability as compared to their baseline.  However, patient continues to struggle with depression which continues to cause impairment in important areas of functioning.  As a result, they can be reasonably expected to continue to benefit from treatment and would likely be at increased risk for decompensation otherwise.    Mental Status Exam:   Hygiene:   good  Cooperation:  Cooperative  Eye Contact:  Good  Psychomotor Behavior:  Appropriate  Affect:  Blunted  Mood: depressed  Speech:  Normal  Thought Process:  Goal directed  Thought Content:  Normal  Suicidal:  None  Homicidal:  None  Hallucinations:  None  Delusion:  None  Memory:  Intact  Orientation:  Grossly intact  Reliability:  good  Insight:  Fair  Judgement:  Fair  Impulse Control:  Fair  Physical/Medical Issues:  Yes menopause        Patient's Support Network Includes:      Functional Status: Moderate impairment     Progress toward goal: Not at goal    Prognosis: Fair with Ongoing Treatment          Plan:    Patient will continue in individual outpatient therapy with focus on improved  functioning and coping skills, maintaining stability, and avoiding decompensation and the need for higher level of care.    Patient will adhere to medication regimen as prescribed and report any side effects. Patient will contact this office, call 911 or present to the nearest emergency room should suicidal or homicidal ideations occur. Provide Cognitive Behavioral Therapy and Solution Focused Therapy to improve functioning, maintain stability, and avoid decompensation and the need for higher level of care.     Return in about 3 weeks or earlier if symptoms worsen or fail to improve.           VISIT DIAGNOSIS:     ICD-10-CM ICD-9-CM   1. Moderate episode of recurrent major depressive disorder  F33.1 296.32              [facility] No Show Policy:  We understand unexpected circumstances arise; however, anytime you miss your appointment we are unable to provide you appropriate care.  In addition, each appointment missed could have been used to provide care for others.  We ask that you call at least 24 hours in advance to cancel or reschedule an appointment.  We would like to take this opportunity to remind you of our policy stating patients who miss THREE or more appointments without cancelling or rescheduling 24 hours in advance of the appointment may be subject to cancellation of any further visits with our clinic and recommendation to seek in-person services/visits.    Please call [phone] to reschedule your appointment. If there are reasons that make it difficult for you to keep the appointments, please call and let us know how we can help.  Please understand that medication prescribing will not continue without seeing your provider.      [facility]'s No Show Policy reviewed with patient at today's visit. Patient verbalized understanding of this policy. Discussed with patient that in the event that there are three or more no show visits, it will be recommended  that they pursue in-person services/visits as noncompliance with telehealth visits indicates that patient is not an appropriate candidate for telemedicine and would likely be more appropriate for in-person services/visits. Patient verbalizes understanding and is agreeable to this.         This document has been electronically signed by Francis Ferreira LCSW  January 9, 2024 20:54 EST     Part of this note may be an electronic transcription/translation of spoken language to printed text using the Dragon Dictation System.

## 2024-01-10 NOTE — PSYCHOTHERAPY NOTE
off pristiq, on wellbutrin now, no bad thoughts, argument with a swim parent, if I did something wrong will be first to admit it, spending own money for swim stuff, stuff will be reimbursed, took high [place] for daughter, stewed on it for 3 days, just mad, second day mad at myself, then 3rd day-why allowing person to make me so upset    If I don't address situation, then will stew on it    Don't care how they feel about me, but care about how I handled it, so many people get the upper hand on me    Seeing things they aren't doing, not taking care of him the way they should, he has bed sore    Can't even visit him because it breaks my heart    Still struggling with sleep    This is ridiculous, stop thinking about it, you're ridiculous    Want to get things addressed so I can move on    Every scenario what I should've done/could've done, worried about these people that won't even see after March    Some people just move on     actually acknowledging me    Hate finances around [place], then taxes January/February     Coworker trying to pull me into her drama, feels she had unfair review    Not going to worry about all that with work    I am [place]    SIFT

## 2024-01-24 ENCOUNTER — TELEMEDICINE (OUTPATIENT)
Dept: PSYCHIATRY | Facility: CLINIC | Age: 54
End: 2024-01-24
Payer: COMMERCIAL

## 2024-01-24 DIAGNOSIS — F41.1 GENERALIZED ANXIETY DISORDER: ICD-10-CM

## 2024-01-24 DIAGNOSIS — F33.1 MODERATE EPISODE OF RECURRENT MAJOR DEPRESSIVE DISORDER: Primary | ICD-10-CM

## 2024-01-24 DIAGNOSIS — G47.01 INSOMNIA DUE TO MEDICAL CONDITION: ICD-10-CM

## 2024-01-24 PROCEDURE — 99214 OFFICE O/P EST MOD 30 MIN: CPT | Performed by: NURSE PRACTITIONER

## 2024-01-24 RX ORDER — MIRTAZAPINE 15 MG/1
15 TABLET, FILM COATED ORAL NIGHTLY
Qty: 30 TABLET | Refills: 2 | Status: SHIPPED | OUTPATIENT
Start: 2024-01-24 | End: 2025-01-23

## 2024-01-24 NOTE — PROGRESS NOTES
Patient Name: Jimena Welch  MRN: [id number]   :  1970     This provider is located at her home office through the [facility] (through [facility]), [address], [address] using a secure V[employer]Port[employer] Video Visit through [employer]. Patient is being seen remotely via telehealth at their home address in [place], and stated they are in a secure environment for this session. The patient's condition being diagnosed/treated is appropriate for telemedicine. The provider identified herself as well as her credentials.   The patient, and/or patients guardian, consent to be seen remotely, and when consent is given they understand that the consent allows for patient identifiable information to be sent to a third party as needed.   They may refuse to be seen remotely at any time. The electronic data is encrypted and password protected, and the patient and/or guardian has been advised of the potential risks to privacy not withstanding such measures.    You have chosen to receive care through a telehealth visit.  Do you consent to use a video/audio connection for your medical care today? Yes    Chief Complaint:      ICD-10-CM ICD-9-CM   1. Moderate episode of recurrent major depressive disorder  F33.1 296.32   2. Generalized anxiety disorder  F41.1 300.02   3. Insomnia due to medical condition  G47.01 327.01       History of Present Illness: Jimena Welch is a 54 y.o. female is here today for medication management follow up.  Patient states Pristiq caused nausea and her stomach to hurt.  Patient wondering about trying Paxil.  Wellbutrin in the past has made her nauseous.  Was having hot flashes during the day with Pristiq as well.  Still struggling with insomnia.    The following portions of the patient's history were reviewed and updated as appropriate: allergies, current medications, past family history, past medical history, past social history, past surgical history, and  problem list.    Review of Systems;;  Review of Systems   Constitutional:  Negative for activity change, appetite change, fatigue, unexpected weight gain and unexpected weight loss.   Respiratory:  Negative for shortness of breath and wheezing.    Gastrointestinal:  Negative for constipation, diarrhea, nausea and vomiting.   Musculoskeletal:  Negative for gait problem.   Skin:  Negative for dry skin and rash.   Neurological:  Negative for dizziness, speech difficulty, weakness, light-headedness, headache, memory problem and confusion.   Psychiatric/Behavioral:  Positive for sleep disturbance, depressed mood and stress. Negative for agitation, behavioral problems, decreased concentration, dysphoric mood, hallucinations, self-injury, suicidal ideas and negative for hyperactivity. The patient is nervous/anxious.        Physical Exam;;  Physical Exam  Constitutional:       General: She is not in acute distress.     Appearance: She is well-developed. She is not diaphoretic.   HENT:      Head: Normocephalic and atraumatic.   Eyes:      Conjunctiva/sclera: Conjunctivae normal.   Pulmonary:      Effort: Pulmonary effort is normal. No respiratory distress.   Musculoskeletal:         General: Normal range of motion.      Cervical back: Full passive range of motion without pain and normal range of motion.   Neurological:      Mental Status: She is alert and oriented to person, place, and time.   Psychiatric:         Mood and Affect: Mood is anxious and depressed. Affect is not labile, blunt, angry or inappropriate.         Speech: Speech is not rapid and pressured or tangential.         Behavior: Behavior normal. Behavior is not agitated, slowed, aggressive, withdrawn, hyperactive or combative. Behavior is cooperative.         Thought Content: Thought content normal. Thought content is not paranoid or delusional. Thought content does not include homicidal or suicidal ideation. Thought content does not include homicidal or  suicidal plan.         Judgment: Judgment normal.       not currently breastfeeding.  There is no height or weight on file to calculate BMI. Video appt unable to obtain.     Current Medications;;    Current Outpatient Medications:     albuterol sulfate  (90 Base) MCG/ACT inhaler, , Disp: , Rfl:     atorvastatin (LIPITOR) 10 MG tablet, TAKE ONE TABLET BY MOUTH DAILY, Disp: 90 tablet, Rfl: 4    azelastine (ASTELIN) 0.1 % nasal spray, , Disp: , Rfl:     diazePAM (VALIUM) 5 MG tablet, Take 1 tablet by mouth Every Night. 1 as needed for anxiety and sleep, Disp: , Rfl:     Estrogel 0.75 MG/1.25 GM (0.06%) topical gel, , Disp: , Rfl:     fluticasone (FLONASE) 50 MCG/ACT nasal spray, into the nostril(s) as directed by provider., Disp: , Rfl:     Fluticasone Furoate 50 MCG/ACT aerosol powder , Inhale 1 puff Daily. Rinse mouth after each use., Disp: 30 each, Rfl: 5    levothyroxine (SYNTHROID, LEVOTHROID) 100 MCG tablet, TAKE 1 TABLET BY MOUTH DAILY, Disp: 30 tablet, Rfl: 3    meloxicam (MOBIC) 15 MG tablet, Take 1 tablet by mouth Daily., Disp: , Rfl:     mirtazapine (Remeron) 15 MG tablet, Take 1 tablet by mouth Every Night., Disp: 30 tablet, Rfl: 2    omeprazole (priLOSEC) 20 MG capsule, Take 1 capsule by mouth Daily., Disp: , Rfl:     PARoxetine (PAXIL) 40 MG tablet, Take 1 tablet by mouth Every Morning., Disp: 30 tablet, Rfl: 3    Prasterone 6.5 MG insert, Insert 1 suppository into the vagina Daily., Disp: , Rfl:     Lab Results:   No visits with results within 3 Month(s) from this visit.   Latest known visit with results is:   Office Visit on 08/01/2023   Component Date Value Ref Range Status    Glucose 08/01/2023 101 (H)  65 - 99 mg/dL Final    BUN 08/01/2023 19  6 - 20 mg/dL Final    Creatinine 08/01/2023 0.76  0.57 - 1.00 mg/dL Final    Sodium 08/01/2023 140  136 - 145 mmol/L Final    Potassium 08/01/2023 4.3  3.5 - 5.2 mmol/L Final    Chloride 08/01/2023 103  98 - 107 mmol/L Final    CO2 08/01/2023 23.8  22.0  - 29.0 mmol/L Final    Calcium 08/01/2023 9.5  8.6 - 10.5 mg/dL Final    Total Protein 08/01/2023 6.8  6.0 - 8.5 g/dL Final    Albumin 08/01/2023 4.1  3.5 - 5.2 g/dL Final    ALT (SGPT) 08/01/2023 21  1 - 33 U/L Final    AST (SGOT) 08/01/2023 16  1 - 32 U/L Final    Alkaline Phosphatase 08/01/2023 89  39 - 117 U/L Final    Total Bilirubin 08/01/2023 0.4  0.0 - 1.2 mg/dL Final    Globulin 08/01/2023 2.7  gm/dL Final    A/G Ratio 08/01/2023 1.5  g/dL Final    BUN/Creatinine Ratio 08/01/2023 25.0  7.0 - 25.0 Final    Anion Gap 08/01/2023 13.2  5.0 - 15.0 mmol/L Final    eGFR 08/01/2023 92.7  >60.0 mL/min/1.73 Final    Total Cholesterol 08/01/2023 192  0 - 200 mg/dL Final    Triglycerides 08/01/2023 175 (H)  0 - 150 mg/dL Final    HDL Cholesterol 08/01/2023 46  40 - 60 mg/dL Final    LDL Cholesterol  08/01/2023 115 (H)  0 - 100 mg/dL Final    VLDL Cholesterol 08/01/2023 31  5 - 40 mg/dL Final    LDL/HDL Ratio 08/01/2023 2.41   Final    TSH 08/01/2023 1.810  0.270 - 4.200 uIU/mL Final    WBC 08/01/2023 6.10  3.40 - 10.80 10*3/mm3 Final    RBC 08/01/2023 4.75  3.77 - 5.28 10*6/mm3 Final    Hemoglobin 08/01/2023 13.5  12.0 - 15.9 g/dL Final    Hematocrit 08/01/2023 40.4  34.0 - 46.6 % Final    MCV 08/01/2023 85.1  79.0 - 97.0 fL Final    MCH 08/01/2023 28.4  26.6 - 33.0 pg Final    MCHC 08/01/2023 33.4  31.5 - 35.7 g/dL Final    RDW 08/01/2023 13.2  12.3 - 15.4 % Final    RDW-SD 08/01/2023 40.4  37.0 - 54.0 fl Final    MPV 08/01/2023 9.4  6.0 - 12.0 fL Final    Platelets 08/01/2023 244  140 - 450 10*3/mm3 Final    Neutrophil % 08/01/2023 53.6  42.7 - 76.0 % Final    Lymphocyte % 08/01/2023 37.2  19.6 - 45.3 % Final    Monocyte % 08/01/2023 8.4  5.0 - 12.0 % Final    Eosinophil % 08/01/2023 0.0 (L)  0.3 - 6.2 % Final    Basophil % 08/01/2023 0.8  0.0 - 1.5 % Final    Immature Grans % 08/01/2023 0.0  0.0 - 0.5 % Final    Neutrophils, Absolute 08/01/2023 3.27  1.70 - 7.00 10*3/mm3 Final    Lymphocytes, Absolute 08/01/2023  2.27  0.70 - 3.10 10*3/mm3 Final    Monocytes, Absolute 08/01/2023 0.51  0.10 - 0.90 10*3/mm3 Final    Eosinophils, Absolute 08/01/2023 0.00  0.00 - 0.40 10*3/mm3 Final    Basophils, Absolute 08/01/2023 0.05  0.00 - 0.20 10*3/mm3 Final    Immature Grans, Absolute 08/01/2023 0.00  0.00 - 0.05 10*3/mm3 Final    nRBC 08/01/2023 0.0  0.0 - 0.2 /100 WBC Final       Mental Status Exam:   Hygiene:   good  Cooperation:  Cooperative  Eye Contact:  Good  Psychomotor Behavior:  Appropriate  Mood:anxious and depressed  Affect:  Appropriate  Hopelessness: Denies  Speech:  Normal  Thought Process:  Goal directed  Thought Content:  Normal  Suicidal:  None  Homicidal:  None  Hallucinations:  None  Delusion:  None  Memory:  Intact  Orientation:  Person, Place, Time, and Situation  Reliability:  good  Insight:  Good  Judgement:  Good  Impulse Control:  Good    PHQ-9 Depression Screening  Little interest or pleasure in doing things? (P) 2-->more than half the days   Feeling down, depressed, or hopeless? (P) 1-->several days   Trouble falling or staying asleep, or sleeping too much? (P) 3-->nearly every day   Feeling tired or having little energy? (P) 3-->nearly every day   Poor appetite or overeating? (P) 3-->nearly every day   Feeling bad about yourself - or that you are a failure or have let yourself or your family down? (P) 1-->several days   Trouble concentrating on things, such as reading the newspaper or watching television? (P) 2-->more than half the days   Moving or speaking so slowly that other people could have noticed? Or the opposite - being so fidgety or restless that you have been moving around a lot more than usual? (P) 0-->not at all   Thoughts that you would be better off dead, or of hurting yourself in some way? (P) 0-->not at all   PHQ-9 Total Score (P) 15   If you checked off any problems, how difficult have these problems made it for you to do your work, take care of things at home, or get along with other people?  (P) very difficult        Assessment/Plan:  Diagnoses and all orders for this visit:    1. Moderate episode of recurrent major depressive disorder (Primary)    2. Generalized anxiety disorder    3. Insomnia due to medical condition  -     mirtazapine (Remeron) 15 MG tablet; Take 1 tablet by mouth Every Night.  Dispense: 30 tablet; Refill: 2      Patient states still struggling with sleep.  Mirtazapine 15 mg every night.    A psychological evaluation was conducted in order to assess past and current level of functioning. Areas assessed included, but were not limited to: perception of social support, perception of ability to face and deal with challenges in life (positive functioning), anxiety symptoms, depressive symptoms, perspective on beliefs/belief system, coping skills for stress, intelligence level,  Therapeutic rapport was established. Interventions conducted today were geared towards incorporating medication management along with support for continued therapy. Education was also provided as to the med management with this provider and what to expect in subsequent sessions.    We discussed risks, benefits,goals and side effects of the above medication and the patient was agreeable with the plan.Patient was educated on the importance of compliance with treatment and follow-up appointments. Patient is aware to contact the [facility] [phone] with any worsening of symptoms. To call for questions or concerns and return early if necessary. Patent is agreeable to go to the Emergency Department or call 911 should they begin SI/HI.     Part of this note may be an electronic transcription/translation of spoken language to printed text using the Dragon Dictation System.    Return in about 4 weeks (around 2/21/2024) for Follow Up 30 min, Video visit.    HIGINIO Valero

## 2024-01-26 ENCOUNTER — OFFICE VISIT (OUTPATIENT)
Dept: FAMILY MEDICINE CLINIC | Facility: CLINIC | Age: 54
End: 2024-01-26
Payer: COMMERCIAL

## 2024-01-26 VITALS
OXYGEN SATURATION: 99 % | SYSTOLIC BLOOD PRESSURE: 137 MMHG | BODY MASS INDEX: 37.1 KG/M2 | DIASTOLIC BLOOD PRESSURE: 84 MMHG | WEIGHT: 244 LBS | HEART RATE: 84 BPM | TEMPERATURE: 97.8 F

## 2024-01-26 DIAGNOSIS — M25.562 ACUTE PAIN OF LEFT KNEE: Primary | ICD-10-CM

## 2024-01-26 PROCEDURE — 99213 OFFICE O/P EST LOW 20 MIN: CPT | Performed by: NURSE PRACTITIONER

## 2024-01-26 RX ORDER — PAROXETINE HYDROCHLORIDE 40 MG/1
40 TABLET, FILM COATED ORAL EVERY MORNING
COMMUNITY
Start: 2024-01-17

## 2024-01-26 NOTE — PROGRESS NOTES
Subjective     Jimena Welch is a 53 y.o. female.     History of Present Illness  Pt is here today with c/o left knee pain  She states it started about 1 wk ago after a fall  She was walking down her driveway and slipped on ice.  She states her knee twisted the opposite direction as the rest of her body.  She states if she rotates the leg to the sides she gets pain in the lateral and medial sides of the knee.  Sitting it doesn't gurts  When she straightens her leg she feels tension and it pops.  Pain intensifies with walking.  It does not give out on her.   She has been taking ibuprofen and can't tell if it works  She has taken mobic as well.    Lower Extremity Issue  Associated symptoms include arthralgias. Pertinent negatives include no chest pain, chills, fatigue or fever. The symptoms are aggravated by movement.        The following portions of the patient's history were reviewed and updated as appropriate: allergies, current medications, past family history, past medical history, past social history, past surgical history, and problem list.    Review of Systems   Constitutional:  Negative for chills, fatigue and fever.   Respiratory:  Negative for chest tightness and shortness of breath.    Cardiovascular:  Negative for chest pain and palpitations.   Musculoskeletal:  Positive for arthralgias.   Neurological:  Negative for dizziness and headache.       Objective     /84 (BP Location: Left arm, Patient Position: Sitting, Cuff Size: Large Adult)   Pulse 84   Temp 97.8 °F (36.6 °C) (Tympanic)   Wt 111 kg (244 lb)   SpO2 99%   BMI 37.10 kg/m²     Current Outpatient Medications on File Prior to Visit   Medication Sig Dispense Refill    levothyroxine (SYNTHROID, LEVOTHROID) 100 MCG tablet TAKE 1 TABLET BY MOUTH DAILY 30 tablet 3    PARoxetine (PAXIL) 40 MG tablet Take 1 tablet by mouth Every Morning.      albuterol sulfate  (90 Base) MCG/ACT inhaler       atorvastatin (LIPITOR) 10 MG tablet TAKE  ONE TABLET BY MOUTH DAILY 90 tablet 4    azelastine (ASTELIN) 0.1 % nasal spray       diazePAM (VALIUM) 5 MG tablet Take 1 tablet by mouth Every Night. 1 as needed for anxiety and sleep      Estrogel 0.75 MG/1.25 GM (0.06%) topical gel       fluticasone (FLONASE) 50 MCG/ACT nasal spray into the nostril(s) as directed by provider.      Fluticasone Furoate 50 MCG/ACT aerosol powder  Inhale 1 puff Daily. Rinse mouth after each use. 30 each 5    meloxicam (MOBIC) 15 MG tablet Take 1 tablet by mouth Daily.      mirtazapine (Remeron) 15 MG tablet Take 1 tablet by mouth Every Night. 30 tablet 2    omeprazole (priLOSEC) 20 MG capsule Take 1 capsule by mouth Daily.      Prasterone 6.5 MG insert Insert 1 suppository into the vagina Daily.      [DISCONTINUED] PARoxetine (PAXIL) 20 MG tablet TAKE ONE TABLET BY MOUTH EVERY MORNING 90 tablet 1     No current facility-administered medications on file prior to visit.                 Physical Exam  Constitutional:       General: She is not in acute distress.     Appearance: Normal appearance. She is not ill-appearing.   Pulmonary:      Effort: Pulmonary effort is normal. No respiratory distress.   Musculoskeletal:         General: No tenderness. Normal range of motion.      Comments: Negative anterior drawer, varus, and valgus   Skin:     General: Skin is warm and dry.   Neurological:      General: No focal deficit present.      Mental Status: She is alert and oriented to person, place, and time.   Psychiatric:         Mood and Affect: Mood normal.         Behavior: Behavior normal.         Thought Content: Thought content normal.         Judgment: Judgment normal.           Assessment & Plan     Diagnoses and all orders for this visit:    1. Acute pain of left knee (Primary)  Comments:  poss meniscus or ligament strain- get xray to r/o bone  cont mobic  get xray  referral to ortho  start PT  wear supportive sleeve at work  Orders:  -     Ambulatory Referral to Orthopedic Surgery  -      Ambulatory Referral to Physical Therapy Evaluate and treat  -     XR Knee 3 View Left; Future

## 2024-01-30 ENCOUNTER — OFFICE VISIT (OUTPATIENT)
Dept: ORTHOPEDIC SURGERY | Facility: CLINIC | Age: 54
End: 2024-01-30
Payer: COMMERCIAL

## 2024-01-30 VITALS — OXYGEN SATURATION: 100 % | BODY MASS INDEX: 36.98 KG/M2 | WEIGHT: 244 LBS | RESPIRATION RATE: 20 BRPM | HEIGHT: 68 IN

## 2024-01-30 DIAGNOSIS — G89.29 CHRONIC PAIN OF LEFT KNEE: Primary | ICD-10-CM

## 2024-01-30 DIAGNOSIS — M25.562 CHRONIC PAIN OF LEFT KNEE: Primary | ICD-10-CM

## 2024-01-30 NOTE — PROGRESS NOTES
[facility]        Patient Name: Jimena Welch  : 1970  Primary Care Physician: Carolin Calero APRN        Chief Complaint:    Chief Complaint   Patient presents with    Left Knee - Pain, Initial Evaluation      DOI- 2024   Started hurting couple days after fall         HPI:   Jimena Welch is a 53 y.o. year old who presents today for evaluation of left knee pain.    She slipped on ice 24 and the left knee internally rotated during her fall. She was able to stand up and bear full weight immediately after the fall. No immediate pain or swelling. No audible pop. ~ 2 days later, she began having some pain in the left knee with some minimal swelling and wanted to have it checked out.     No previous injury or surgery to the left knee.           Past Medical/Surgical, Social and Family History:  I have reviewed and/or updated pertinent history as noted in the medical record including:  Past Medical History:   Diagnosis Date    Allergic     Anxiety     Anxiety disorder 08/15/2019    Arthritis     Asthma     Chronic pain disorder     CTS (carpal tunnel syndrome)     Depression     Disease of thyroid gland     Hyperlipidemia     Obesity     Obsessive-compulsive disorder     Suicide attempt     Urinary tract infection      Past Surgical History:   Procedure Laterality Date    BLADDER SURGERY      CHOLECYSTECTOMY      COLONOSCOPY      FRACTURE SURGERY  Collarbone when a child    HYSTERECTOMY      NASAL SEPTUM SURGERY      2019    SINUS SURGERY      THYROIDECTOMY      WISDOM TOOTH EXTRACTION       Social History     Occupational History    Not on file   Tobacco Use    Smoking status: Never    Smokeless tobacco: Never   Vaping Use    Vaping Use: Never used   Substance and Sexual Activity    Alcohol use: Not Currently     Alcohol/week: 2.0 standard drinks of alcohol     Types: 2 Drinks containing 0.5 oz of alcohol per week     Comment: rarely    Drug use: No    Sexual activity: Yes     Partners: Male      Birth control/protection: Condom, Post-menopausal, None          Allergies: No Known Allergies    Medications:   Home Medications:  Current Outpatient Medications on File Prior to Visit   Medication Sig    albuterol sulfate  (90 Base) MCG/ACT inhaler     atorvastatin (LIPITOR) 10 MG tablet TAKE ONE TABLET BY MOUTH DAILY    azelastine (ASTELIN) 0.1 % nasal spray     diazePAM (VALIUM) 5 MG tablet Take 1 tablet by mouth Every Night. 1 as needed for anxiety and sleep    Estrogel 0.75 MG/1.25 GM (0.06%) topical gel     fluticasone (FLONASE) 50 MCG/ACT nasal spray into the nostril(s) as directed by provider.    Fluticasone Furoate 50 MCG/ACT aerosol powder  Inhale 1 puff Daily. Rinse mouth after each use.    levothyroxine (SYNTHROID, LEVOTHROID) 100 MCG tablet TAKE 1 TABLET BY MOUTH DAILY    meloxicam (MOBIC) 15 MG tablet Take 1 tablet by mouth Daily.    mirtazapine (Remeron) 15 MG tablet Take 1 tablet by mouth Every Night.    omeprazole (priLOSEC) 20 MG capsule Take 1 capsule by mouth Daily.    PARoxetine (PAXIL) 40 MG tablet Take 1 tablet by mouth Every Morning.    Prasterone 6.5 MG insert Insert 1 suppository into the vagina Daily.     No current facility-administered medications on file prior to visit.         ROS:  Negative unless listed in the HPI    Physical Exam:   53 y.o. female  Body mass index is 37.1 kg/m²., 111 kg (244 lb)  Vitals:    01/30/24 1259   Resp: 20   SpO2: 100%     General: Alert, cooperative, appears well and in no observable distress.   HEENT: Normocephalic, atraumatic on external visual inspection. No icterus.   CV: No significant peripheral edema.    Respiratory: Normal respiratory effort.   Skin: Warm & well perfused; appropriate skin turgor.  Psych: Appropriate mood & affect.  Neuro: Gross sensation and motor intact in affected extremity/extremities.  Vascular: Peripheral pulses palpable in affected extremity/extremities.     Left Knee Exam   Left knee exam is normal.    Muscle  Strength   The patient has normal left knee strength.    Range of Motion   Extension:  normal   Flexion:  normal     Tests   Ashley:  Medial - negative Lateral - negative  Varus: negative Valgus: negative  Drawer:  Anterior - negative     Posterior - negative  Patellar apprehension: negative    Other   Erythema: absent  Sensation: normal  Pulse: present  Swelling: none  Effusion: no effusion present               Radiology:  Narrative & Impression   XR KNEE 3 VW LEFT     Date of Exam: 1/30/2024 1:05 PM EST     Indication: Left knee, Date of injury-1/20/2024, pain for about 10 days, fell in her drive way     Comparison: None available.     Findings:  There are mild degenerative changes in the medial and patellofemoral compartments. There is a small suprapatellar effusion. Patellar enthesophytes are noted. No acute fracture or dislocation identified. No gross osseous lesion is seen.     IMPRESSION:  Impression:  Mild degenerative changes. No acute bony injury. Small suprapatellar effusion.        Electronically Signed: Mark Smiley MD    1/30/2024 4:27 PM EST    Workstation ID: [id number]       Assessment:  Left knee strain  Body mass index is 37.1 kg/m².  BMI consistent with Obese Class II: 35-39.9kg/m2           Plan:  Reviewed the above imaging and exam with the patient today  No acute injury identified; likely strain  Recommend OTC NSAIDs PRN  No additional imaging warranted  BMI reviewed  Follow up PRN  Patient encouraged to call with any questions or concerns in the interim    HIGINIO Jack

## 2024-01-31 ENCOUNTER — TELEMEDICINE (OUTPATIENT)
Dept: PSYCHIATRY | Facility: CLINIC | Age: 54
End: 2024-01-31
Payer: COMMERCIAL

## 2024-01-31 DIAGNOSIS — F33.1 MODERATE EPISODE OF RECURRENT MAJOR DEPRESSIVE DISORDER: Primary | ICD-10-CM

## 2024-01-31 PROCEDURE — 90834 PSYTX W PT 45 MINUTES: CPT | Performed by: SOCIAL WORKER

## 2024-01-31 NOTE — PROGRESS NOTES
Date: January 31, 2024  Time In: 14:46  Time Out: 15:38    This provider is located at home address in connection with the [facility] (through [facility]), [address], [place] [address] using a secure [employer] Video Visit through [employer]. Patient is being seen remotely via telehealth at home address in [place] and stated they are in a secure environment for this session. The patient's condition being diagnosed/treated is appropriate for telemedicine. The provider identified himself as well as his credentials. The patient, and/or patients guardian, consent to be seen remotely, and when consent is given they understand that the consent allows for patient identifiable information to be sent to a third party as needed. They may refuse to be seen remotely at any time. The electronic data is encrypted and password protected, and the patient and/or guardian has been advised of the potential risks to privacy not withstanding such measures.  You have chosen to receive care through a telehealth visit.  Do you consent to use a video/audio connection for your medical care today? Yes    PROGRESS NOTE  Data:  Jimena Welch is a 53 y.o. female who presents today for follow up    Chief Complaint: depression    History of Present Illness: Patient reports ongoing stressors related to work increasing, father's decline, challenges with extended family, and difficulties with medications. Reports some increased depression this week with father going into hospice. Reports some success with sleep aids at times. Looking forward to having the house to herself.     Clinical Maneuvering/Intervention:  Assisted patient in processing above session content; acknowledged and normalized patient’s thoughts, feelings, and concerns.  Rationalized patient thought process regarding needing time alone  Discussed triggers associated with patient's depression.  Also discussed possible use of SIFT acronym  (Sensation, Image, Feeling, and Thought about self) as journaling/thought exercise when stuck on rethinking scenario repeatedly.     Allowed patient to freely discuss issues without interruption or judgment. Provided safe, confidential environment to facilitate the development of positive therapeutic relationship and encourage open, honest communication. Assisted patient in identifying risk factors which would indicate the need for higher level of care including thoughts to harm self or others and/or self-harming behavior and encouraged patient to contact this office, call 911, or present to the nearest emergency room should any of these events occur. Discussed crisis intervention services and means to access. Patient adamantly and convincingly denies current suicidal or homicidal ideation or perceptual disturbance.    Assessment:   Assessment   Patient appears to maintain relative stability as compared to their baseline.  However, patient continues to struggle with depression which continues to cause impairment in important areas of functioning.  As a result, they can be reasonably expected to continue to benefit from treatment and would likely be at increased risk for decompensation otherwise.    Mental Status Exam:   Hygiene:   good  Cooperation:  Cooperative  Eye Contact:  Good  Psychomotor Behavior:  Appropriate  Affect:  Blunted  Mood: depressed  Speech:  Normal  Thought Process:  Goal directed  Thought Content:  Normal  Suicidal:  None  Homicidal:  None  Hallucinations:  None  Delusion:  None  Memory:  Intact  Orientation:  Grossly intact  Reliability:  good  Insight:  Fair  Judgement:  Fair  Impulse Control:  Fair  Physical/Medical Issues:  Yes menopause        Patient's Support Network Includes:      Functional Status: Moderate impairment     Progress toward goal: Not at goal    Prognosis: Fair with Ongoing Treatment          Plan:    Patient will continue in individual outpatient therapy with focus  on improved functioning and coping skills, maintaining stability, and avoiding decompensation and the need for higher level of care.    Patient will adhere to medication regimen as prescribed and report any side effects. Patient will contact this office, call 911 or present to the nearest emergency room should suicidal or homicidal ideations occur. Provide Cognitive Behavioral Therapy and Solution Focused Therapy to improve functioning, maintain stability, and avoid decompensation and the need for higher level of care.     Return in about 2 weeks or earlier if symptoms worsen or fail to improve.           VISIT DIAGNOSIS:     ICD-10-CM ICD-9-CM   1. Moderate episode of recurrent major depressive disorder  F33.1 296.32                [facility] No Show Policy:  We understand unexpected circumstances arise; however, anytime you miss your appointment we are unable to provide you appropriate care.  In addition, each appointment missed could have been used to provide care for others.  We ask that you call at least 24 hours in advance to cancel or reschedule an appointment.  We would like to take this opportunity to remind you of our policy stating patients who miss THREE or more appointments without cancelling or rescheduling 24 hours in advance of the appointment may be subject to cancellation of any further visits with our clinic and recommendation to seek in-person services/visits.    Please call [phone] to reschedule your appointment. If there are reasons that make it difficult for you to keep the appointments, please call and let us know how we can help.  Please understand that medication prescribing will not continue without seeing your provider.      [facility]'s No Show Policy reviewed with patient at today's visit. Patient verbalized understanding of this policy. Discussed with patient that in the event that there are three or more no show visits, it will be  recommended that they pursue in-person services/visits as noncompliance with telehealth visits indicates that patient is not an appropriate candidate for telemedicine and would likely be more appropriate for in-person services/visits. Patient verbalizes understanding and is agreeable to this.         This document has been electronically signed by Francis Ferreira LCSW  January 31, 2024 20:51 EST     Part of this note may be an electronic transcription/translation of spoken language to printed text using the Dragon Dictation System.

## 2024-02-01 ENCOUNTER — PATIENT ROUNDING (BHMG ONLY) (OUTPATIENT)
Dept: ORTHOPEDIC SURGERY | Facility: CLINIC | Age: 54
End: 2024-02-01
Payer: COMMERCIAL

## 2024-02-01 NOTE — PROGRESS NOTES
A My-Chart message has been sent to the patient for PATIENT ROUNDING with [facility]

## 2024-02-01 NOTE — PSYCHOTHERAPY NOTE
Dad going into hospice, getting double the care    Significant weight loss, moving him to death ewing, he's comfortable, not hurting or anything, still want to be able to try to talk to him    Buried 3 loved ones in 8 years, cremation and get ashes, celebration of life    Brother coming in next week, flying in the 6th, then out on the 7th    When he dies may not see him again    They left as mom was being lowered into the ground    Teaching my children to spend time    Calling his side of the family and telling them to come see him    Doing my part that's all that matters    First two nights on Remeron 15, slept good    Have to decide each night    Times not understanding why so angry, have thrown things

## 2024-02-22 ENCOUNTER — TELEMEDICINE (OUTPATIENT)
Dept: PSYCHIATRY | Facility: CLINIC | Age: 54
End: 2024-02-22
Payer: COMMERCIAL

## 2024-02-22 DIAGNOSIS — G47.01 INSOMNIA DUE TO MEDICAL CONDITION: ICD-10-CM

## 2024-02-22 DIAGNOSIS — F33.1 MODERATE EPISODE OF RECURRENT MAJOR DEPRESSIVE DISORDER: Primary | ICD-10-CM

## 2024-02-22 DIAGNOSIS — F41.1 GENERALIZED ANXIETY DISORDER: ICD-10-CM

## 2024-02-22 PROCEDURE — 99214 OFFICE O/P EST MOD 30 MIN: CPT | Performed by: NURSE PRACTITIONER

## 2024-02-22 RX ORDER — PAROXETINE HYDROCHLORIDE 40 MG/1
40 TABLET, FILM COATED ORAL EVERY MORNING
Qty: 30 TABLET | Refills: 3 | Status: SHIPPED | OUTPATIENT
Start: 2024-02-22

## 2024-02-22 NOTE — PROGRESS NOTES
Patient Name: Jimena Welch  MRN: [id number]   :  1970     This provider is located at her home office through the [facility] (through [facility]), [address], [address] using a secure [employer] Video Visit through [employer]. Patient is being seen remotely via telehealth at their home address in [place], and stated they are in a secure environment for this session. The patient's condition being diagnosed/treated is appropriate for telemedicine. The provider identified herself as well as her credentials.   The patient, and/or patients guardian, consent to be seen remotely, and when consent is given they understand that the consent allows for patient identifiable information to be sent to a third party as needed.   They may refuse to be seen remotely at any time. The electronic data is encrypted and password protected, and the patient and/or guardian has been advised of the potential risks to privacy not withstanding such measures.    You have chosen to receive care through a telehealth visit.  Do you consent to use a video/audio connection for your medical care today? Yes    Chief Complaint:      ICD-10-CM ICD-9-CM   1. Moderate episode of recurrent major depressive disorder  F33.1 296.32   2. Generalized anxiety disorder  F41.1 300.02   3. Insomnia due to medical condition  G47.01 327.01       History of Present Illness: Jimena Welch is a 54 y.o. female is here today for medication management follow up.  Patient states the last few days she has not been able to sleep at all.  Hot flashes are better.  Knee is hurting.  Does know she sleeps better on her left side.  Has not been going to sleep till 130 or 2 AM.    The following portions of the patient's history were reviewed and updated as appropriate: allergies, current medications, past family history, past medical history, past social history, past surgical history, and problem list.    Review of  Systems;;  Review of Systems   Constitutional:  Negative for activity change, appetite change, fatigue, unexpected weight gain and unexpected weight loss.   Respiratory:  Negative for shortness of breath and wheezing.    Gastrointestinal:  Negative for constipation, diarrhea, nausea and vomiting.   Musculoskeletal:  Negative for gait problem.   Skin:  Negative for dry skin and rash.   Neurological:  Negative for dizziness, speech difficulty, weakness, light-headedness, headache, memory problem and confusion.   Psychiatric/Behavioral:  Positive for agitation, sleep disturbance and stress. Negative for behavioral problems, decreased concentration, dysphoric mood, hallucinations, self-injury, suicidal ideas, negative for hyperactivity and depressed mood. The patient is not nervous/anxious.        Physical Exam;;  Physical Exam  Constitutional:       General: She is not in acute distress.     Appearance: She is well-developed. She is not diaphoretic.   HENT:      Head: Normocephalic and atraumatic.   Eyes:      Conjunctiva/sclera: Conjunctivae normal.   Pulmonary:      Effort: Pulmonary effort is normal. No respiratory distress.   Musculoskeletal:         General: Normal range of motion.      Cervical back: Full passive range of motion without pain and normal range of motion.   Neurological:      Mental Status: She is alert and oriented to person, place, and time.   Psychiatric:         Mood and Affect: Mood is not anxious or depressed. Affect is not labile, blunt, angry or inappropriate.         Speech: Speech is not rapid and pressured or tangential.         Behavior: Behavior normal. Behavior is not agitated, slowed, aggressive, withdrawn, hyperactive or combative. Behavior is cooperative.         Thought Content: Thought content normal. Thought content is not paranoid or delusional. Thought content does not include homicidal or suicidal ideation. Thought content does not include homicidal or suicidal plan.          Judgment: Judgment normal.       not currently breastfeeding.  There is no height or weight on file to calculate BMI. Video appt unable to obtain.     Current Medications;;    Current Outpatient Medications:     PARoxetine (PAXIL) 40 MG tablet, Take 1 tablet by mouth Every Morning., Disp: 30 tablet, Rfl: 3    albuterol sulfate  (90 Base) MCG/ACT inhaler, , Disp: , Rfl:     atorvastatin (LIPITOR) 10 MG tablet, TAKE ONE TABLET BY MOUTH DAILY, Disp: 90 tablet, Rfl: 4    azelastine (ASTELIN) 0.1 % nasal spray, , Disp: , Rfl:     diazePAM (VALIUM) 5 MG tablet, Take 1 tablet by mouth Every Night. 1 as needed for anxiety and sleep, Disp: , Rfl:     Estrogel 0.75 MG/1.25 GM (0.06%) topical gel, , Disp: , Rfl:     fluticasone (FLONASE) 50 MCG/ACT nasal spray, into the nostril(s) as directed by provider., Disp: , Rfl:     Fluticasone Furoate 50 MCG/ACT aerosol powder , Inhale 1 puff Daily. Rinse mouth after each use., Disp: 30 each, Rfl: 5    levothyroxine (SYNTHROID, LEVOTHROID) 100 MCG tablet, TAKE 1 TABLET BY MOUTH DAILY, Disp: 30 tablet, Rfl: 3    meloxicam (MOBIC) 15 MG tablet, Take 1 tablet by mouth Daily., Disp: , Rfl:     mirtazapine (Remeron) 15 MG tablet, Take 1 tablet by mouth Every Night., Disp: 30 tablet, Rfl: 2    omeprazole (priLOSEC) 20 MG capsule, Take 1 capsule by mouth Daily., Disp: , Rfl:     Prasterone 6.5 MG insert, Insert 1 suppository into the vagina Daily., Disp: , Rfl:     Lab Results:   No visits with results within 3 Month(s) from this visit.   Latest known visit with results is:   Office Visit on 08/01/2023   Component Date Value Ref Range Status    Glucose 08/01/2023 101 (H)  65 - 99 mg/dL Final    BUN 08/01/2023 19  6 - 20 mg/dL Final    Creatinine 08/01/2023 0.76  0.57 - 1.00 mg/dL Final    Sodium 08/01/2023 140  136 - 145 mmol/L Final    Potassium 08/01/2023 4.3  3.5 - 5.2 mmol/L Final    Chloride 08/01/2023 103  98 - 107 mmol/L Final    CO2 08/01/2023 23.8  22.0 - 29.0 mmol/L Final     Calcium 08/01/2023 9.5  8.6 - 10.5 mg/dL Final    Total Protein 08/01/2023 6.8  6.0 - 8.5 g/dL Final    Albumin 08/01/2023 4.1  3.5 - 5.2 g/dL Final    ALT (SGPT) 08/01/2023 21  1 - 33 U/L Final    AST (SGOT) 08/01/2023 16  1 - 32 U/L Final    Alkaline Phosphatase 08/01/2023 89  39 - 117 U/L Final    Total Bilirubin 08/01/2023 0.4  0.0 - 1.2 mg/dL Final    Globulin 08/01/2023 2.7  gm/dL Final    A/G Ratio 08/01/2023 1.5  g/dL Final    BUN/Creatinine Ratio 08/01/2023 25.0  7.0 - 25.0 Final    Anion Gap 08/01/2023 13.2  5.0 - 15.0 mmol/L Final    eGFR 08/01/2023 92.7  >60.0 mL/min/1.73 Final    Total Cholesterol 08/01/2023 192  0 - 200 mg/dL Final    Triglycerides 08/01/2023 175 (H)  0 - 150 mg/dL Final    HDL Cholesterol 08/01/2023 46  40 - 60 mg/dL Final    LDL Cholesterol  08/01/2023 115 (H)  0 - 100 mg/dL Final    VLDL Cholesterol 08/01/2023 31  5 - 40 mg/dL Final    LDL/HDL Ratio 08/01/2023 2.41   Final    TSH 08/01/2023 1.810  0.270 - 4.200 uIU/mL Final    WBC 08/01/2023 6.10  3.40 - 10.80 10*3/mm3 Final    RBC 08/01/2023 4.75  3.77 - 5.28 10*6/mm3 Final    Hemoglobin 08/01/2023 13.5  12.0 - 15.9 g/dL Final    Hematocrit 08/01/2023 40.4  34.0 - 46.6 % Final    MCV 08/01/2023 85.1  79.0 - 97.0 fL Final    MCH 08/01/2023 28.4  26.6 - 33.0 pg Final    MCHC 08/01/2023 33.4  31.5 - 35.7 g/dL Final    RDW 08/01/2023 13.2  12.3 - 15.4 % Final    RDW-SD 08/01/2023 40.4  37.0 - 54.0 fl Final    MPV 08/01/2023 9.4  6.0 - 12.0 fL Final    Platelets 08/01/2023 244  140 - 450 10*3/mm3 Final    Neutrophil % 08/01/2023 53.6  42.7 - 76.0 % Final    Lymphocyte % 08/01/2023 37.2  19.6 - 45.3 % Final    Monocyte % 08/01/2023 8.4  5.0 - 12.0 % Final    Eosinophil % 08/01/2023 0.0 (L)  0.3 - 6.2 % Final    Basophil % 08/01/2023 0.8  0.0 - 1.5 % Final    Immature Grans % 08/01/2023 0.0  0.0 - 0.5 % Final    Neutrophils, Absolute 08/01/2023 3.27  1.70 - 7.00 10*3/mm3 Final    Lymphocytes, Absolute 08/01/2023 2.27  0.70 - 3.10 10*3/mm3  Final    Monocytes, Absolute 08/01/2023 0.51  0.10 - 0.90 10*3/mm3 Final    Eosinophils, Absolute 08/01/2023 0.00  0.00 - 0.40 10*3/mm3 Final    Basophils, Absolute 08/01/2023 0.05  0.00 - 0.20 10*3/mm3 Final    Immature Grans, Absolute 08/01/2023 0.00  0.00 - 0.05 10*3/mm3 Final    nRBC 08/01/2023 0.0  0.0 - 0.2 /100 WBC Final       Mental Status Exam:   Hygiene:   good  Cooperation:  Cooperative  Eye Contact:  Good  Psychomotor Behavior:  Appropriate  Mood:irritable  Affect:  Appropriate  Hopelessness: Denies  Speech:  Normal  Thought Process:  Goal directed  Thought Content:  Normal  Suicidal:  None  Homicidal:  None  Hallucinations:  None  Delusion:  None  Memory:  Intact  Orientation:  Person, Place, Time, and Situation  Reliability:  good  Insight:  Good  Judgement:  Good  Impulse Control:  Good    PHQ-9 Depression Screening  Little interest or pleasure in doing things?     Feeling down, depressed, or hopeless?     Trouble falling or staying asleep, or sleeping too much?     Feeling tired or having little energy?     Poor appetite or overeating?     Feeling bad about yourself - or that you are a failure or have let yourself or your family down?     Trouble concentrating on things, such as reading the newspaper or watching television?     Moving or speaking so slowly that other people could have noticed? Or the opposite - being so fidgety or restless that you have been moving around a lot more than usual?     Thoughts that you would be better off dead, or of hurting yourself in some way?     PHQ-9 Total Score     If you checked off any problems, how difficult have these problems made it for you to do your work, take care of things at home, or get along with other people?          Assessment/Plan:  Diagnoses and all orders for this visit:    1. Moderate episode of recurrent major depressive disorder (Primary)  -     PARoxetine (PAXIL) 40 MG tablet; Take 1 tablet by mouth Every Morning.  Dispense: 30 tablet;  Refill: 3    2. Generalized anxiety disorder  -     PARoxetine (PAXIL) 40 MG tablet; Take 1 tablet by mouth Every Morning.  Dispense: 30 tablet; Refill: 3    3. Insomnia due to medical condition  -     Ambulatory Referral to Sleep Educator      Patient continues to have significant insomnia.  We will refer her to a sleep specialist.  We will follow-up in 1 month.    A psychological evaluation was conducted in order to assess past and current level of functioning. Areas assessed included, but were not limited to: perception of social support, perception of ability to face and deal with challenges in life (positive functioning), anxiety symptoms, depressive symptoms, perspective on beliefs/belief system, coping skills for stress, intelligence level,  Therapeutic rapport was established. Interventions conducted today were geared towards incorporating medication management along with support for continued therapy. Education was also provided as to the med management with this provider and what to expect in subsequent sessions.    We discussed risks, benefits,goals and side effects of the above medication and the patient was agreeable with the plan.Patient was educated on the importance of compliance with treatment and follow-up appointments. Patient is aware to contact the [facility] [phone] with any worsening of symptoms. To call for questions or concerns and return early if necessary. Patent is agreeable to go to the Emergency Department or call 911 should they begin SI/HI.     Part of this note may be an electronic transcription/translation of spoken language to printed text using the Dragon Dictation System.    Return in about 4 weeks (around 3/21/2024) for Follow Up 30 min, Video visit.    HIGINIO Valero

## 2024-02-27 ENCOUNTER — TELEMEDICINE (OUTPATIENT)
Dept: PSYCHIATRY | Facility: CLINIC | Age: 54
End: 2024-02-27
Payer: COMMERCIAL

## 2024-02-27 DIAGNOSIS — F33.1 MODERATE EPISODE OF RECURRENT MAJOR DEPRESSIVE DISORDER: Primary | ICD-10-CM

## 2024-02-27 NOTE — PROGRESS NOTES
Date: February 27, 2024  Time In: 13:35  Time Out: 14:19    This provider is located at home address in connection with the [facility] (through [facility]), [address], [place] [address] using a secure [employer] Video Visit through [employer]. Patient is being seen remotely via telehealth at home address in [place] and stated they are in a secure environment for this session. The patient's condition being diagnosed/treated is appropriate for telemedicine. The provider identified himself as well as his credentials. The patient, and/or patients guardian, consent to be seen remotely, and when consent is given they understand that the consent allows for patient identifiable information to be sent to a third party as needed. They may refuse to be seen remotely at any time. The electronic data is encrypted and password protected, and the patient and/or guardian has been advised of the potential risks to privacy not withstanding such measures.  You have chosen to receive care through a telehealth visit.  Do you consent to use a video/audio connection for your medical care today? Yes    PROGRESS NOTE  Data:  Jimena Welch is a 54 y.o. female who presents today for follow up    Chief Complaint: depression    History of Present Illness: Patient reports ongoing stressors related to difficulty with sleep due to bladder infection, college search, and work being slow. Reports having had some improvement in ability to fall asleep recently with use of over the counter solutions.    Clinical Maneuvering/Intervention:  Assisted patient in processing above session content; acknowledged and normalized patient’s thoughts, feelings, and concerns.  Rationalized patient thought process regarding individual factors reducing efficacy of medications  Discussed triggers associated with patient's depression.      Allowed patient to freely discuss issues without interruption or judgment. Provided  safe, confidential environment to facilitate the development of positive therapeutic relationship and encourage open, honest communication. Assisted patient in identifying risk factors which would indicate the need for higher level of care including thoughts to harm self or others and/or self-harming behavior and encouraged patient to contact this office, call 911, or present to the nearest emergency room should any of these events occur. Discussed crisis intervention services and means to access. Patient adamantly and convincingly denies current suicidal or homicidal ideation or perceptual disturbance.    Assessment:   Assessment   Patient appears to maintain relative stability as compared to their baseline.  However, patient continues to struggle with depression which continues to cause impairment in important areas of functioning.  As a result, they can be reasonably expected to continue to benefit from treatment and would likely be at increased risk for decompensation otherwise.    Mental Status Exam:   Hygiene:   good  Cooperation:  Cooperative  Eye Contact:  Good  Psychomotor Behavior:  Appropriate  Affect:  Blunted  Mood: depressed  Speech:  Normal  Thought Process:  Goal directed  Thought Content:  Normal  Suicidal:  None  Homicidal:  None  Hallucinations:  None  Delusion:  None  Memory:  Intact  Orientation:  Grossly intact  Reliability:  good  Insight:  Fair  Judgement:  Fair  Impulse Control:  Fair  Physical/Medical Issues:  Yes menopause        Patient's Support Network Includes:      Functional Status: Moderate impairment     Progress toward goal: Not at goal    Prognosis: Fair with Ongoing Treatment          Plan:    Patient will continue in individual outpatient therapy with focus on improved functioning and coping skills, maintaining stability, and avoiding decompensation and the need for higher level of care.    Patient will adhere to medication regimen as prescribed and report any side  effects. Patient will contact this office, call 911 or present to the nearest emergency room should suicidal or homicidal ideations occur. Provide Cognitive Behavioral Therapy and Solution Focused Therapy to improve functioning, maintain stability, and avoid decompensation and the need for higher level of care.     Return in about 3 weeks or earlier if symptoms worsen or fail to improve.           VISIT DIAGNOSIS:     ICD-10-CM ICD-9-CM   1. Moderate episode of recurrent major depressive disorder  F33.1 296.32                  [facility] No Show Policy:  We understand unexpected circumstances arise; however, anytime you miss your appointment we are unable to provide you appropriate care.  In addition, each appointment missed could have been used to provide care for others.  We ask that you call at least 24 hours in advance to cancel or reschedule an appointment.  We would like to take this opportunity to remind you of our policy stating patients who miss THREE or more appointments without cancelling or rescheduling 24 hours in advance of the appointment may be subject to cancellation of any further visits with our clinic and recommendation to seek in-person services/visits.    Please call [phone] to reschedule your appointment. If there are reasons that make it difficult for you to keep the appointments, please call and let us know how we can help.  Please understand that medication prescribing will not continue without seeing your provider.      [facility]'s No Show Policy reviewed with patient at today's visit. Patient verbalized understanding of this policy. Discussed with patient that in the event that there are three or more no show visits, it will be recommended that they pursue in-person services/visits as noncompliance with telehealth visits indicates that patient is not an appropriate candidate for telemedicine and would likely be more appropriate for  in-person services/visits. Patient verbalizes understanding and is agreeable to this.         This document has been electronically signed by Francis Ferreira LCSW  February 27, 2024 21:06 EST     Part of this note may be an electronic transcription/translation of spoken language to printed text using the Dragon Dictation System.

## 2024-02-28 NOTE — PSYCHOTHERAPY NOTE
"Not sleeping, am actually sleeping but bladder infection kept me up, clovid does help with sleep    College visit, [employer]     She likes [employer] but only for short period    [employer], [employer], [employer]    Will be good having someone normal and close    Got into [employer] program, she hated that the most    [employer] is just right but doesn't have law program    Couldn't have car freshman year at [employer]    This time of year, work is so slow, don't want to come in, bored out of mind    Not absorbing medicine as well    Been doing this since 2014, still not sleeping, hate hearing \"it's menopause\"    Brother has same issue, trouble sleeping    Know that mom napped a lot    We've always been light sleepers    One of thyroid glands removed, gissell at 11, 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

## 2024-02-29 ENCOUNTER — OFFICE VISIT (OUTPATIENT)
Dept: FAMILY MEDICINE CLINIC | Facility: CLINIC | Age: 54
End: 2024-02-29
Payer: COMMERCIAL

## 2024-02-29 VITALS
DIASTOLIC BLOOD PRESSURE: 82 MMHG | BODY MASS INDEX: 37.56 KG/M2 | SYSTOLIC BLOOD PRESSURE: 135 MMHG | OXYGEN SATURATION: 97 % | HEART RATE: 75 BPM | TEMPERATURE: 98.7 F | WEIGHT: 247 LBS

## 2024-02-29 DIAGNOSIS — R35.0 URINARY FREQUENCY: Primary | ICD-10-CM

## 2024-02-29 PROCEDURE — 99213 OFFICE O/P EST LOW 20 MIN: CPT | Performed by: NURSE PRACTITIONER

## 2024-02-29 PROCEDURE — 87086 URINE CULTURE/COLONY COUNT: CPT | Performed by: NURSE PRACTITIONER

## 2024-02-29 RX ORDER — NITROFURANTOIN 25; 75 MG/1; MG/1
100 CAPSULE ORAL 2 TIMES DAILY
Qty: 10 CAPSULE | Refills: 0 | Status: SHIPPED | OUTPATIENT
Start: 2024-02-29 | End: 2024-03-05

## 2024-02-29 NOTE — PROGRESS NOTES
Subjective     Jimena Welch is a 54 y.o. female.     History of Present Illness  Pt is here today with c/o urinary frequency.  She states her symptoms started 2-3 days ago  She is on pyridium so UA could not be completed- will send for culture  She states the frequency is every hour  She has a history of IC  She doesn't have a lot of burning or pressure  Denies fever or chills  Denies back pain    Dysuria   This is a recurrent problem. The current episode started in the past 7 days. The problem occurs constantly. The problem has been worsening. The quality of the pain is described as burning. The pain is at a severity of 7/10. There has been no fever. She is not sexually active. There is no history of pyelonephritis. Associated symptoms include a discharge, frequency, hesitancy and urgency. Pertinent negatives include no chills, flank pain, hematuria, nausea, possible pregnancy, sweats or vomiting.   Additional comments: I have IC  Difficulty Urinating   Associated symptoms include a discharge, frequency, hesitancy and urgency. Pertinent negatives include no chills, flank pain, hematuria, nausea, possible pregnancy, sweats or vomiting.        The following portions of the patient's history were reviewed and updated as appropriate: allergies, current medications, past family history, past medical history, past social history, past surgical history, and problem list.    Review of Systems   Constitutional:  Negative for chills, fatigue and fever.   Respiratory:  Negative for chest tightness and shortness of breath.    Cardiovascular:  Negative for chest pain and palpitations.   Gastrointestinal:  Negative for abdominal pain, nausea and vomiting.   Genitourinary:  Positive for difficulty urinating, frequency, hesitancy and urgency. Negative for dysuria, flank pain and hematuria.   Neurological:  Negative for dizziness and headache.       Objective     /82 (BP Location: Left arm, Patient Position: Sitting, Cuff  Size: Large Adult)   Pulse 75   Temp 98.7 °F (37.1 °C) (Tympanic)   Wt 112 kg (247 lb)   SpO2 97%   BMI 37.56 kg/m²     Current Outpatient Medications on File Prior to Visit   Medication Sig Dispense Refill    albuterol sulfate  (90 Base) MCG/ACT inhaler       atorvastatin (LIPITOR) 10 MG tablet TAKE ONE TABLET BY MOUTH DAILY 90 tablet 4    azelastine (ASTELIN) 0.1 % nasal spray       diazePAM (VALIUM) 5 MG tablet Take 1 tablet by mouth Every Night. 1 as needed for anxiety and sleep      Estrogel 0.75 MG/1.25 GM (0.06%) topical gel       fluticasone (FLONASE) 50 MCG/ACT nasal spray into the nostril(s) as directed by provider.      Fluticasone Furoate 50 MCG/ACT aerosol powder  Inhale 1 puff Daily. Rinse mouth after each use. 30 each 5    levothyroxine (SYNTHROID, LEVOTHROID) 100 MCG tablet TAKE 1 TABLET BY MOUTH DAILY 30 tablet 3    meloxicam (MOBIC) 15 MG tablet Take 1 tablet by mouth Daily.      mirtazapine (Remeron) 15 MG tablet Take 1 tablet by mouth Every Night. 30 tablet 2    omeprazole (priLOSEC) 20 MG capsule Take 1 capsule by mouth Daily.      PARoxetine (PAXIL) 40 MG tablet Take 1 tablet by mouth Every Morning. 30 tablet 3    Prasterone 6.5 MG insert Insert 1 suppository into the vagina Daily.       No current facility-administered medications on file prior to visit.                 Physical Exam  Constitutional:       Appearance: Normal appearance. She is not ill-appearing.   HENT:      Head: Normocephalic and atraumatic.   Pulmonary:      Effort: Pulmonary effort is normal. No respiratory distress.      Breath sounds: Normal breath sounds.   Abdominal:      General: Abdomen is flat.      Palpations: Abdomen is soft.   Musculoskeletal:         General: Normal range of motion.   Skin:     General: Skin is warm and dry.   Neurological:      General: No focal deficit present.      Mental Status: She is alert and oriented to person, place, and time.   Psychiatric:         Mood and Affect: Mood  normal.         Behavior: Behavior normal.         Thought Content: Thought content normal.         Judgment: Judgment normal.           Assessment & Plan     Diagnoses and all orders for this visit:    1. Urinary frequency (Primary)  Comments:  unable to do UA- wlill send for culture  treat with macrobid  push water  sees urology next week  Orders:  -     Urine Culture - Urine, Urine, Clean Catch  -     nitrofurantoin, macrocrystal-monohydrate, (Macrobid) 100 MG capsule; Take 1 capsule by mouth 2 (Two) Times a Day for 5 days.  Dispense: 10 capsule; Refill: 0

## 2024-03-02 LAB — BACTERIA SPEC AEROBE CULT: NORMAL

## 2024-03-18 ENCOUNTER — TELEPHONE (OUTPATIENT)
Dept: PSYCHIATRY | Facility: CLINIC | Age: 54
End: 2024-03-18
Payer: COMMERCIAL

## 2024-03-18 NOTE — TELEPHONE ENCOUNTER
Faxed to [facility] [place] [facility] at [phone]. Clinic will reach out to get patient scheduled.

## 2024-04-03 ENCOUNTER — TELEMEDICINE (OUTPATIENT)
Dept: PSYCHIATRY | Facility: CLINIC | Age: 54
End: 2024-04-03
Payer: COMMERCIAL

## 2024-04-03 DIAGNOSIS — F41.1 GENERALIZED ANXIETY DISORDER: Primary | ICD-10-CM

## 2024-04-03 NOTE — PROGRESS NOTES
Date: April 3, 2024  Time In: 14:39  Time Out: 15:09    This provider is located at home address in connection with the [facility] (through [facility]), [address], [place] [address] using a secure [employer] Video Visit through [employer]. Patient is being seen remotely via telehealth at home address in [place] and stated they are in a secure environment for this session. The patient's condition being diagnosed/treated is appropriate for telemedicine. The provider identified himself as well as his credentials. The patient, and/or patients guardian, consent to be seen remotely, and when consent is given they understand that the consent allows for patient identifiable information to be sent to a third party as needed. They may refuse to be seen remotely at any time. The electronic data is encrypted and password protected, and the patient and/or guardian has been advised of the potential risks to privacy not withstanding such measures.  You have chosen to receive care through a telehealth visit.  Do you consent to use a video/audio connection for your medical care today? Yes    PROGRESS NOTE  Data:  Jimena Welch is a 54 y.o. female who presents today for follow up    Chief Complaint: anxiety    History of Present Illness: Reports ongoing stressors related to work, daughter's college decision, unforeseen finance changes with dad, and having to cancel vacation. Reports some improvement in mood with beginning to get better sleep following specific regimen before bed. Looking forward to paying off loan, possibly going back to school, and taking some time away with .     Clinical Maneuvering/Intervention:  Assisted patient in processing above session content; acknowledged and normalized patient’s thoughts, feelings, and concerns.  Rationalized patient thought process regarding having things to look forward to.  Discussed triggers associated with patient's anxiety.       Allowed patient to freely discuss issues without interruption or judgment. Provided safe, confidential environment to facilitate the development of positive therapeutic relationship and encourage open, honest communication. Assisted patient in identifying risk factors which would indicate the need for higher level of care including thoughts to harm self or others and/or self-harming behavior and encouraged patient to contact this office, call 911, or present to the nearest emergency room should any of these events occur. Discussed crisis intervention services and means to access. Patient adamantly and convincingly denies current suicidal or homicidal ideation or perceptual disturbance.    Assessment:   Assessment   Patient appears to maintain relative stability as compared to their baseline.  However, patient continues to struggle with anxiety which continues to cause impairment in important areas of functioning.  As a result, they can be reasonably expected to continue to benefit from treatment and would likely be at increased risk for decompensation otherwise.    Mental Status Exam:   Hygiene:   good  Cooperation:  Cooperative  Eye Contact:  Good  Psychomotor Behavior:  Appropriate  Affect:  Blunted  Mood: normal  Speech:  Normal  Thought Process:  Goal directed  Thought Content:  Normal  Suicidal:  None  Homicidal:  None  Hallucinations:  None  Delusion:  None  Memory:  Intact  Orientation:  Grossly intact  Reliability:  good  Insight:  Fair  Judgement:  Fair  Impulse Control:  Fair  Physical/Medical Issues:  Yes menopause        Patient's Support Network Includes:      Functional Status: Moderate impairment     Progress toward goal: Not at goal    Prognosis: Fair with Ongoing Treatment          Plan:    Patient will continue in individual outpatient therapy with focus on improved functioning and coping skills, maintaining stability, and avoiding decompensation and the need for higher level of  care.    Patient will adhere to medication regimen as prescribed and report any side effects. Patient will contact this office, call 911 or present to the nearest emergency room should suicidal or homicidal ideations occur. Provide Cognitive Behavioral Therapy and Solution Focused Therapy to improve functioning, maintain stability, and avoid decompensation and the need for higher level of care.     Return in about 12 weeks or earlier if symptoms worsen or fail to improve.           VISIT DIAGNOSIS:     ICD-10-CM ICD-9-CM   1. Generalized anxiety disorder  F41.1 300.02                    [facility] No Show Policy:  We understand unexpected circumstances arise; however, anytime you miss your appointment we are unable to provide you appropriate care.  In addition, each appointment missed could have been used to provide care for others.  We ask that you call at least 24 hours in advance to cancel or reschedule an appointment.  We would like to take this opportunity to remind you of our policy stating patients who miss THREE or more appointments without cancelling or rescheduling 24 hours in advance of the appointment may be subject to cancellation of any further visits with our clinic and recommendation to seek in-person services/visits.    Please call [phone] to reschedule your appointment. If there are reasons that make it difficult for you to keep the appointments, please call and let us know how we can help.  Please understand that medication prescribing will not continue without seeing your provider.      [facility]'s No Show Policy reviewed with patient at today's visit. Patient verbalized understanding of this policy. Discussed with patient that in the event that there are three or more no show visits, it will be recommended that they pursue in-person services/visits as noncompliance with telehealth visits indicates that patient is not an appropriate candidate  for telemedicine and would likely be more appropriate for in-person services/visits. Patient verbalizes understanding and is agreeable to this.         This document has been electronically signed by Francis Ferreira LCSW  April 3, 2024 21:42 EDT     Part of this note may be an electronic transcription/translation of spoken language to printed text using the Dragon Dictation System.

## 2024-05-01 RX ORDER — LEVOTHYROXINE SODIUM 0.1 MG/1
100 TABLET ORAL DAILY
Qty: 30 TABLET | Refills: 3 | Status: SHIPPED | OUTPATIENT
Start: 2024-05-01

## 2024-05-01 NOTE — TELEPHONE ENCOUNTER
Last OV 2/29/2024  Next OV 6/7/2024   Spoke with Kaushik Knox (Spouse). Updated on pt condition. All questions answered.

## 2024-05-23 RX ORDER — ATORVASTATIN CALCIUM 10 MG/1
TABLET, FILM COATED ORAL
Qty: 90 TABLET | Refills: 4 | Status: SHIPPED | OUTPATIENT
Start: 2024-05-23

## 2024-06-20 ENCOUNTER — LAB (OUTPATIENT)
Dept: FAMILY MEDICINE CLINIC | Facility: CLINIC | Age: 54
End: 2024-06-20
Payer: COMMERCIAL

## 2024-06-20 ENCOUNTER — OFFICE VISIT (OUTPATIENT)
Dept: FAMILY MEDICINE CLINIC | Facility: CLINIC | Age: 54
End: 2024-06-20
Payer: COMMERCIAL

## 2024-06-20 VITALS
WEIGHT: 233 LBS | HEART RATE: 80 BPM | BODY MASS INDEX: 35.43 KG/M2 | OXYGEN SATURATION: 99 % | DIASTOLIC BLOOD PRESSURE: 82 MMHG | TEMPERATURE: 98.2 F | SYSTOLIC BLOOD PRESSURE: 130 MMHG

## 2024-06-20 DIAGNOSIS — E03.9 HYPOTHYROIDISM, UNSPECIFIED TYPE: ICD-10-CM

## 2024-06-20 DIAGNOSIS — M79.605 LEFT LEG PAIN: ICD-10-CM

## 2024-06-20 DIAGNOSIS — F32.A ANXIETY AND DEPRESSION: ICD-10-CM

## 2024-06-20 DIAGNOSIS — M79.644 PAIN IN FINGER OF RIGHT HAND: ICD-10-CM

## 2024-06-20 DIAGNOSIS — F41.9 ANXIETY AND DEPRESSION: ICD-10-CM

## 2024-06-20 DIAGNOSIS — E03.9 HYPOTHYROIDISM, UNSPECIFIED TYPE: Primary | ICD-10-CM

## 2024-06-20 DIAGNOSIS — M06.9 RHEUMATOID ARTHRITIS, INVOLVING UNSPECIFIED SITE, UNSPECIFIED WHETHER RHEUMATOID FACTOR PRESENT: ICD-10-CM

## 2024-06-20 DIAGNOSIS — E78.5 HYPERLIPIDEMIA, UNSPECIFIED HYPERLIPIDEMIA TYPE: ICD-10-CM

## 2024-06-20 DIAGNOSIS — N30.10 INTERSTITIAL CYSTITIS: ICD-10-CM

## 2024-06-20 LAB
ALBUMIN SERPL-MCNC: 4.2 G/DL (ref 3.5–5.2)
ALBUMIN/GLOB SERPL: 1.6 G/DL
ALP SERPL-CCNC: 96 U/L (ref 39–117)
ALT SERPL W P-5'-P-CCNC: 22 U/L (ref 1–33)
ANION GAP SERPL CALCULATED.3IONS-SCNC: 9.6 MMOL/L (ref 5–15)
AST SERPL-CCNC: 13 U/L (ref 1–32)
BILIRUB SERPL-MCNC: 0.3 MG/DL (ref 0–1.2)
BUN SERPL-MCNC: 17 MG/DL (ref 6–20)
BUN/CREAT SERPL: 20.7 (ref 7–25)
CALCIUM SPEC-SCNC: 9.4 MG/DL (ref 8.6–10.5)
CHLORIDE SERPL-SCNC: 107 MMOL/L (ref 98–107)
CHOLEST SERPL-MCNC: 185 MG/DL (ref 0–200)
CO2 SERPL-SCNC: 24.4 MMOL/L (ref 22–29)
CREAT SERPL-MCNC: 0.82 MG/DL (ref 0.57–1)
EGFRCR SERPLBLD CKD-EPI 2021: 84.1 ML/MIN/1.73
GLOBULIN UR ELPH-MCNC: 2.6 GM/DL
GLUCOSE SERPL-MCNC: 108 MG/DL (ref 65–99)
HDLC SERPL-MCNC: 44 MG/DL (ref 40–60)
LDLC SERPL CALC-MCNC: 113 MG/DL (ref 0–100)
LDLC/HDLC SERPL: 2.49 {RATIO}
POTASSIUM SERPL-SCNC: 3.9 MMOL/L (ref 3.5–5.2)
PROT SERPL-MCNC: 6.8 G/DL (ref 6–8.5)
SODIUM SERPL-SCNC: 141 MMOL/L (ref 136–145)
TRIGL SERPL-MCNC: 158 MG/DL (ref 0–150)
TSH SERPL DL<=0.05 MIU/L-ACNC: 0.99 UIU/ML (ref 0.27–4.2)
VLDLC SERPL-MCNC: 28 MG/DL (ref 5–40)

## 2024-06-20 PROCEDURE — 84443 ASSAY THYROID STIM HORMONE: CPT | Performed by: NURSE PRACTITIONER

## 2024-06-20 PROCEDURE — 36415 COLL VENOUS BLD VENIPUNCTURE: CPT

## 2024-06-20 PROCEDURE — 80053 COMPREHEN METABOLIC PANEL: CPT | Performed by: NURSE PRACTITIONER

## 2024-06-20 PROCEDURE — 99214 OFFICE O/P EST MOD 30 MIN: CPT | Performed by: NURSE PRACTITIONER

## 2024-06-20 PROCEDURE — 80061 LIPID PANEL: CPT | Performed by: NURSE PRACTITIONER

## 2024-06-20 RX ORDER — HYDROXYZINE HYDROCHLORIDE 10 MG/1
10 TABLET, FILM COATED ORAL DAILY
COMMUNITY
Start: 2024-03-07 | End: 2025-03-07

## 2024-06-20 NOTE — PROGRESS NOTES
Subjective     Jimena Welch is a 54 y.o. female.     History of Present Illness  Patient is here today to follow-up on chronic medical conditions.  She works at a [employer]  She is staying active with work.  Doesn't eat a well balanced diet.   She does not smoke  Drinks on rare occasion.   Pt c/o left upper leg pain.  She gets pain in the upper leg  She states it feels like a hot poker in the leg  She has back pain  The leg burns and feels like a shock  Pain occurs daily  The shock pain lasts 10-15 mins and then gets better- stays sore     Hypothyroid-  Had her thyroid removed.  Had nodules when she was 11.    She is currently taking synthroid 100 mcg. She is doing well on the medication at this time.  She does not see endocrinology.      Interstitial Cystitis-  Sees urologynecology-Takes pyridium as needed.  Watches her diet. She is doing ok at this time. She had surgery on 12/23/22 - cystohydro.      Hormone replacement- she is off hormones. Having issues. Will see Dr. Lezama.      GERD- takes 20mg omeprazole occasionally.  It is controlled fine.     Anxiety- she is currently on paxil 40mg daily. It is working well. She is having some hot flashes at night.  Has tries quivivic, temazipam, ambien, pristiq, zoloft, and valium.      Hyperlipidemia- Takes 10mg lipitor. She is tolerating it well.       Sleep apnea- does not use a CPAP. She states she has been having trouble sleeping. She sees ENT. She had a sleep study that showed her sleep apnea wasn't as bad.      IBS- she is taking a probiotic. It is helping.  Had colonoscopy in 2018 and everything was fine.     RA/Hand pain- she is now seeing rheumatology. She is on meloxicam. She has RA. She is stable, does have some pain intermittently. She is having some right pinky pain. She jammed it a week ago. She states it is very swollen.      Labs- UTD  Pap smear- UTD had hysterectomy- doesn't need paps- sees GYN  Mammogram-  12/7/23  DEXA-n/a  Colonoscopy- cologua  1/5/22     Vaccines:  Flu-  UTD  PNA- n/a  Shingles- due  Tdap- UTD     Dental exam- due  Eye exam- utd               The following portions of the patient's history were reviewed and updated as appropriate: allergies, current medications, past family history, past medical history, past social history, past surgical history, and problem list.    Review of Systems   Constitutional:  Positive for diaphoresis. Negative for chills, fatigue and fever.   Respiratory:  Negative for chest tightness and shortness of breath.    Cardiovascular:  Negative for chest pain and palpitations.   Gastrointestinal:  Negative for abdominal pain, nausea and vomiting.   Musculoskeletal:  Positive for arthralgias.        Right pinky pain   Neurological:  Negative for dizziness and headache.   Psychiatric/Behavioral:  Negative for self-injury, suicidal ideas, depressed mood and stress. The patient is not nervous/anxious.        Objective     /82 (BP Location: Left arm, Patient Position: Sitting, Cuff Size: Large Adult)   Pulse 80   Temp 98.2 °F (36.8 °C) (Tympanic)   Wt 106 kg (233 lb)   SpO2 99%   BMI 35.43 kg/m²     Current Outpatient Medications on File Prior to Visit   Medication Sig Dispense Refill    hydrOXYzine (ATARAX) 10 MG tablet Take 1 tablet by mouth Daily.      albuterol sulfate  (90 Base) MCG/ACT inhaler       atorvastatin (LIPITOR) 10 MG tablet TAKE ONE TABLET BY MOUTH DAILY 90 tablet 4    azelastine (ASTELIN) 0.1 % nasal spray       Estrogel 0.75 MG/1.25 GM (0.06%) topical gel       fluticasone (FLONASE) 50 MCG/ACT nasal spray into the nostril(s) as directed by provider.      Fluticasone Furoate 50 MCG/ACT aerosol powder  Inhale 1 puff Daily. Rinse mouth after each use. 30 each 5    levothyroxine (SYNTHROID, LEVOTHROID) 100 MCG tablet TAKE 1 TABLET BY MOUTH DAILY 30 tablet 3    meloxicam (MOBIC) 15 MG tablet Take 1 tablet by mouth Daily.      omeprazole (priLOSEC) 20 MG capsule Take 1 capsule by mouth  Daily.      PARoxetine (PAXIL) 40 MG tablet Take 1 tablet by mouth Every Morning. 30 tablet 3    Prasterone 6.5 MG insert Insert 1 suppository into the vagina Daily.      [DISCONTINUED] diazePAM (VALIUM) 5 MG tablet Take 1 tablet by mouth Every Night. 1 as needed for anxiety and sleep      [DISCONTINUED] mirtazapine (Remeron) 15 MG tablet Take 1 tablet by mouth Every Night. 30 tablet 2     No current facility-administered medications on file prior to visit.                 Physical Exam  Constitutional:       General: She is not in acute distress.     Appearance: Normal appearance. She is not ill-appearing.   HENT:      Head: Normocephalic and atraumatic.   Eyes:      Extraocular Movements: Extraocular movements intact.   Cardiovascular:      Rate and Rhythm: Normal rate and regular rhythm.      Heart sounds: No murmur heard.  Pulmonary:      Effort: Pulmonary effort is normal. No respiratory distress.   Musculoskeletal:      Comments: Right pinky swelling at the joint   Skin:     General: Skin is warm and dry.   Neurological:      General: No focal deficit present.      Mental Status: She is alert and oriented to person, place, and time.   Psychiatric:         Mood and Affect: Mood normal.         Behavior: Behavior normal.         Thought Content: Thought content normal.         Judgment: Judgment normal.           Assessment & Plan     Diagnoses and all orders for this visit:    1. Hypothyroidism, unspecified type (Primary)  Comments:  stable  cont med  check lab  Orders:  -     TSH; Future    2. Hyperlipidemia, unspecified hyperlipidemia type  Comments:  stable  cont statin  work on diet and exercise  check labs  Orders:  -     Comprehensive Metabolic Panel; Future  -     Lipid Panel; Future    3. Anxiety and depression  Comments:  stable  cont paxil  denies SI or HI    4. Interstitial cystitis  Comments:  stable  sees urogynecology  watches diet    5. Rheumatoid arthritis, involving unspecified site,  unspecified whether rheumatoid factor present  Comments:  stable  sees rheum    6. Pain in finger of right hand  Comments:  injury to right pinky  get xray  swelling- possible sprain  Orders:  -     XR Hand 3+ View Right; Future    7. Left leg pain  Comments:  unknown etiology  possibly stemming from back?  referral to ortho for further eval  Orders:  -     Ambulatory Referral to Orthopedic Surgery

## 2024-07-02 ENCOUNTER — OFFICE VISIT (OUTPATIENT)
Dept: ORTHOPEDIC SURGERY | Facility: CLINIC | Age: 54
End: 2024-07-02
Payer: COMMERCIAL

## 2024-07-02 VITALS — HEIGHT: 68 IN | BODY MASS INDEX: 35.31 KG/M2 | HEART RATE: 78 BPM | WEIGHT: 233 LBS | OXYGEN SATURATION: 98 %

## 2024-07-02 DIAGNOSIS — M70.62 TROCHANTERIC BURSITIS OF LEFT HIP: Primary | ICD-10-CM

## 2024-07-02 PROCEDURE — 99213 OFFICE O/P EST LOW 20 MIN: CPT | Performed by: NURSE PRACTITIONER

## 2024-07-02 PROCEDURE — 20610 DRAIN/INJ JOINT/BURSA W/O US: CPT | Performed by: NURSE PRACTITIONER

## 2024-07-02 RX ORDER — TRIAMCINOLONE ACETONIDE 40 MG/ML
80 INJECTION, SUSPENSION INTRA-ARTICULAR; INTRAMUSCULAR
Status: COMPLETED | OUTPATIENT
Start: 2024-07-02 | End: 2024-07-02

## 2024-07-02 RX ORDER — LIDOCAINE HYDROCHLORIDE 10 MG/ML
2 INJECTION, SOLUTION INFILTRATION; PERINEURAL
Status: COMPLETED | OUTPATIENT
Start: 2024-07-02 | End: 2024-07-02

## 2024-07-02 RX ADMIN — LIDOCAINE HYDROCHLORIDE 2 ML: 10 INJECTION, SOLUTION INFILTRATION; PERINEURAL at 15:44

## 2024-07-02 RX ADMIN — TRIAMCINOLONE ACETONIDE 80 MG: 40 INJECTION, SUSPENSION INTRA-ARTICULAR; INTRAMUSCULAR at 15:44

## 2024-07-02 NOTE — PROGRESS NOTES
FOLLOW UP VISIT        Patient Name: Jimena Welch  : 1970  Primary Care Physician: Carolin Calero APRN        Chief Complaint:  Left lateral hip pain    HPI:   Jimena Welch is a 54 y.o. year old who presents today for evaluation of the above. Onset of symptoms a few weeks ago. 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 Pain is focal to the left, lateral hip. It does radiate down the lateral thigh a bit. Denies numbness/tingling. Full ROM and strength.       Past Medical/Surgical, Social and Family History:  I have reviewed and/or updated pertinent history as noted in the medical record including:  Past Medical History:   Diagnosis Date    Allergic     Anxiety     Anxiety disorder 08/15/2019    Arthritis     Asthma     Chronic pain disorder     CTS (carpal tunnel syndrome)     Depression     Disease of thyroid gland     Dislocation of finger     Fracture, humerus     Frozen shoulder     Hyperlipidemia     Knee swelling     Obesity     Obsessive-compulsive disorder     Peripheral neuropathy     Suicide attempt     Urinary tract infection      Past Surgical History:   Procedure Laterality Date    BLADDER SURGERY      CHOLECYSTECTOMY      COLONOSCOPY      FRACTURE SURGERY  Collarbone when a child    HYSTERECTOMY      NASAL SEPTUM SURGERY      2019    SINUS SURGERY      THYROIDECTOMY      WISDOM TOOTH EXTRACTION       Social History     Occupational History    Not on file   Tobacco Use    Smoking status: Never    Smokeless tobacco: Never   Vaping Use    Vaping status: Never Used   Substance and Sexual Activity    Alcohol use: Yes     Alcohol/week: 2.0 standard drinks of alcohol     Types: 2 Drinks containing 0.5 oz of alcohol per week     Comment: rarely    Drug use: No    Sexual activity: Yes     Partners: Male     Birth control/protection: Condom, Post-menopausal, None, Hysterectomy      Social History     Social History Narrative    Not on file     Family History   Problem  Relation Age of Onset    Heart disease Father     Thyroid disease Father     Depression Father     Dementia Father     Other Father         Parkisons and Dementia    Asthma Mother     Cancer Mother         Brain Cancer    Thyroid disease Mother     Arthritis Mother     Depression Mother     Anxiety disorder Mother     Suicide Attempts Mother     Cancer Paternal Grandfather         Lung Cancer       Allergies: No Known Allergies    Medications:   Home Medications:  Current Outpatient Medications on File Prior to Visit   Medication Sig    albuterol sulfate  (90 Base) MCG/ACT inhaler     atorvastatin (LIPITOR) 10 MG tablet TAKE ONE TABLET BY MOUTH DAILY    azelastine (ASTELIN) 0.1 % nasal spray     Estrogel 0.75 MG/1.25 GM (0.06%) topical gel     fluticasone (FLONASE) 50 MCG/ACT nasal spray into the nostril(s) as directed by provider.    Fluticasone Furoate 50 MCG/ACT aerosol powder  Inhale 1 puff Daily. Rinse mouth after each use.    hydrOXYzine (ATARAX) 10 MG tablet Take 1 tablet by mouth Daily.    levothyroxine (SYNTHROID, LEVOTHROID) 100 MCG tablet TAKE 1 TABLET BY MOUTH DAILY    meloxicam (MOBIC) 15 MG tablet Take 1 tablet by mouth Daily.    omeprazole (priLOSEC) 20 MG capsule Take 1 capsule by mouth Daily.    PARoxetine (PAXIL) 40 MG tablet Take 1 tablet by mouth Every Morning.    Prasterone 6.5 MG insert Insert 1 suppository into the vagina Daily.     No current facility-administered medications on file prior to visit.         ROS:  14 point review of systems was negative except as listed in the HPI     Physical Exam:   54 y.o. female  Body mass index is 35.43 kg/m²., 106 kg (233 lb)  Vitals:    07/02/24 1414   Pulse: 78   SpO2: 98%         General: Alert, cooperative, appears well and in no observable distress.   HEENT: Normocephalic, atraumatic on external visual inspection. No icterus.   CV: No significant peripheral edema.   Respiratory: Normal respiratory effort.   Skin: Warm & well perfused;  appropriate skin turgor.  Psych: Appropriate mood & affect.  Neuro: Gross sensation and motor intact in affected extremity/extremities.  Vascular: Peripheral pulses palpable in affected extremity/extremities. Calves/compartments soft and non tender, no evidence of DVT or compartment syndrome.    Left Hip Exam     Range of Motion   The patient has normal left hip ROM.    Muscle Strength   The patient has normal left hip strength.               Hip Musculoskeletal Exam  Gait    Gait is normal.    Inspection    Left      Erythema: none        Ecchymosis: none        Edema: none        Deformity: none      Palpation    Left      Increased warmth: none      Tenderness: present        Greater trochanteric region pain: moderate    Range of Motion    Left      Left hip range of motion is within functional limits.     Strength    Left      Left hip strength is normal.            Investigations:  No new x-rays were needed today.        Large Joint Arthrocentesis: L greater trochanteric bursa  Date/Time: 7/2/2024 3:44 PM  Consent given by: patient  Site marked: site marked  Timeout: Immediately prior to procedure a time out was called to verify the correct patient, procedure, equipment, support staff and site/side marked as required   Supporting Documentation  Indications: pain and diagnostic evaluation   Procedure Details  Location: hip - L greater trochanteric bursa  Needle size: 25 G  Approach: lateral  Medications administered: 2 mL lidocaine 1 %; 80 mg triamcinolone acetonide 40 MG/ML  Patient tolerance: patient tolerated the procedure well with no immediate complications              Assessment:  Left greater trochanteric bursitis   Body mass index is 35.43 kg/m².  BMI consistent with Obese Class II: 35-39.9kg/m2             Plan:  Reviewed the above assessment with the patient in detail today  Discussed treatment option of steroid injection for acute relief of symptoms  She was agreeable. Risk/benefits reviewed. Please  see documentation above.   Follow up PRN   Patient encouraged to call with questions or concerns in the interim      HIGINIO Jack

## 2024-07-03 ENCOUNTER — PATIENT ROUNDING (BHMG ONLY) (OUTPATIENT)
Dept: ORTHOPEDIC SURGERY | Facility: CLINIC | Age: 54
End: 2024-07-03
Payer: COMMERCIAL

## 2024-07-03 NOTE — PROGRESS NOTES
A my chart message has been sent to the patient for [place] [facility]

## 2024-07-16 ENCOUNTER — TELEMEDICINE (OUTPATIENT)
Dept: PSYCHIATRY | Facility: CLINIC | Age: 54
End: 2024-07-16
Payer: COMMERCIAL

## 2024-07-16 DIAGNOSIS — F41.1 GENERALIZED ANXIETY DISORDER: Primary | ICD-10-CM

## 2024-07-16 NOTE — PROGRESS NOTES
Date: July 16, 2024  Time In: 14:30  Time Out: 15:35    This provider is located at home address in connection with the [facility] (through [facility]), [address], [place] [address] using a secure [employer] Video Visit through [id number]. Patient is being seen remotely via telehealth at home address in [place] and stated they are in a secure environment for this session. The patient's condition being diagnosed/treated is appropriate for telemedicine. The provider identified himself as well as his credentials. The patient, and/or patients guardian, consent to be seen remotely, and when consent is given they understand that the consent allows for patient identifiable information to be sent to a third party as needed. They may refuse to be seen remotely at any time. The electronic data is encrypted and password protected, and the patient and/or guardian has been advised of the potential risks to privacy not withstanding such measures.  You have chosen to receive care through a telehealth visit.  Do you consent to use a video/audio connection for your medical care today? Yes    PROGRESS NOTE  Data:  Jimena Welch is a 54 y.o. female who presents today for follow up    Chief Complaint: anxiety    History of Present Illness: Reports ongoing stressors related to managing physical symptoms, loss in family, father's decline, daughter going off to college, and ongoing challenges with sleep.     Clinical Maneuvering/Intervention:  Assisted patient in processing above session content; acknowledged and normalized patient’s thoughts, feelings, and concerns.  Rationalized patient thought process regarding being okay with a bad day.  Discussed triggers associated with patient's anxiety.      Allowed patient to freely discuss issues without interruption or judgment. Provided safe, confidential environment to facilitate the development of positive therapeutic relationship and encourage  open, honest communication. Assisted patient in identifying risk factors which would indicate the need for higher level of care including thoughts to harm self or others and/or self-harming behavior and encouraged patient to contact this office, call 911, or present to the nearest emergency room should any of these events occur. Discussed crisis intervention services and means to access. Patient adamantly and convincingly denies current suicidal or homicidal ideation or perceptual disturbance.    Assessment:   Assessment   Patient appears to maintain relative stability as compared to their baseline.  However, patient continues to struggle with anxiety which continues to cause impairment in important areas of functioning.  As a result, they can be reasonably expected to continue to benefit from treatment and would likely be at increased risk for decompensation otherwise.    Mental Status Exam:   Hygiene:   good  Cooperation:  Cooperative  Eye Contact:  Good  Psychomotor Behavior:  Appropriate  Affect:  Blunted  Mood: normal  Speech:  Normal  Thought Process:  Goal directed  Thought Content:  Normal  Suicidal:  None  Homicidal:  None  Hallucinations:  None  Delusion:  None  Memory:  Intact  Orientation:  Grossly intact  Reliability:  good  Insight:  Fair  Judgement:  Fair  Impulse Control:  Fair  Physical/Medical Issues:  Yes menopause        Patient's Support Network Includes:      Functional Status: Moderate impairment     Progress toward goal: Not at goal    Prognosis: Fair with Ongoing Treatment          Plan:    Patient will continue in individual outpatient therapy with focus on improved functioning and coping skills, maintaining stability, and avoiding decompensation and the need for higher level of care.    Patient will adhere to medication regimen as prescribed and report any side effects. Patient will contact this office, call 911 or present to the nearest emergency room should suicidal or homicidal  ideations occur. Provide Cognitive Behavioral Therapy and Solution Focused Therapy to improve functioning, maintain stability, and avoid decompensation and the need for higher level of care.     Return in about 10 weeks or earlier if symptoms worsen or fail to improve.           VISIT DIAGNOSIS:     ICD-10-CM ICD-9-CM   1. Generalized anxiety disorder  F41.1 300.02                      [facility] No Show Policy:  We understand unexpected circumstances arise; however, anytime you miss your appointment we are unable to provide you appropriate care.  In addition, each appointment missed could have been used to provide care for others.  We ask that you call at least 24 hours in advance to cancel or reschedule an appointment.  We would like to take this opportunity to remind you of our policy stating patients who miss THREE or more appointments without cancelling or rescheduling 24 hours in advance of the appointment may be subject to cancellation of any further visits with our clinic and recommendation to seek in-person services/visits.    Please call [phone] to reschedule your appointment. If there are reasons that make it difficult for you to keep the appointments, please call and let us know how we can help.  Please understand that medication prescribing will not continue without seeing your provider.      [facility]'s No Show Policy reviewed with patient at today's visit. Patient verbalized understanding of this policy. Discussed with patient that in the event that there are three or more no show visits, it will be recommended that they pursue in-person services/visits as noncompliance with telehealth visits indicates that patient is not an appropriate candidate for telemedicine and would likely be more appropriate for in-person services/visits. Patient verbalizes understanding and is agreeable to this.         This document has been electronically signed by  Francis Ferreira, ADELAIDAW  July 17, 2024 08:56 EDT     Part of this note may be an electronic transcription/translation of spoken language to printed text using the Dragon Dictation System.

## 2024-07-17 NOTE — PSYCHOTHERAPY NOTE
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     Don't get it until forgetting things    Everything slows down    Just put off moving for at least another 4 years, payment is under a thousand    College is going to be 1800/month    Son will probably be moving in with mother in law    We'll actually find time alone, looking forward to that    Grocery bill will be reduced

## 2024-08-27 RX ORDER — LEVOTHYROXINE SODIUM 100 UG/1
100 TABLET ORAL DAILY
Qty: 30 TABLET | Refills: 3 | Status: SHIPPED | OUTPATIENT
Start: 2024-08-27

## 2024-09-24 ENCOUNTER — TELEMEDICINE (OUTPATIENT)
Dept: PSYCHIATRY | Facility: CLINIC | Age: 54
End: 2024-09-24
Payer: COMMERCIAL

## 2024-09-24 DIAGNOSIS — F41.1 GENERALIZED ANXIETY DISORDER: Primary | ICD-10-CM

## 2024-09-24 NOTE — PROGRESS NOTES
Date: September 24, 2024  Time In: 15:33  Time Out: 16:05    This provider is located at home address in connection with the [facility] (through [facility]), [address], [place] [address] using a secure [employer] Video Visit through [employer]. Patient is being seen remotely via telehealth at home address in [place] and stated they are in a secure environment for this session. The patient's condition being diagnosed/treated is appropriate for telemedicine. The provider identified himself as well as his credentials. The patient, and/or patients guardian, consent to be seen remotely, and when consent is given they understand that the consent allows for patient identifiable information to be sent to a third party as needed. They may refuse to be seen remotely at any time. The electronic data is encrypted and password protected, and the patient and/or guardian has been advised of the potential risks to privacy not withstanding such measures.  You have chosen to receive care through a telehealth visit.  Do you consent to use a video/audio connection for your medical care today? Yes    PROGRESS NOTE  Data:  Jimena Welch is a 54 y.o. female who presents today for follow up    Chief Complaint: anxiety    History of Present Illness: Reports ongoing stressors related to father actively dying and extended family lack of involvement. Appreciative of flexibility with job and of daughter's success in college.     Clinical Maneuvering/Intervention:  Assisted patient in processing above session content; acknowledged and normalized patient’s thoughts, feelings, and concerns.  Rationalized patient thought process regarding knowing having done all she can.  Discussed triggers associated with patient's anxiety.      Allowed patient to freely discuss issues without interruption or judgment. Provided safe, confidential environment to facilitate the development of positive therapeutic  relationship and encourage open, honest communication. Assisted patient in identifying risk factors which would indicate the need for higher level of care including thoughts to harm self or others and/or self-harming behavior and encouraged patient to contact this office, call 911, or present to the nearest emergency room should any of these events occur. Discussed crisis intervention services and means to access. Patient adamantly and convincingly denies current suicidal or homicidal ideation or perceptual disturbance.    Assessment:   Assessment   Patient appears to maintain relative stability as compared to their baseline.  However, patient continues to struggle with anxiety which continues to cause impairment in important areas of functioning.  As a result, they can be reasonably expected to continue to benefit from treatment and would likely be at increased risk for decompensation otherwise.    Mental Status Exam:   Hygiene:   good  Cooperation:  Cooperative  Eye Contact:  Good  Psychomotor Behavior:  Appropriate  Affect:  Blunted  Mood: sad  Speech:  Normal  Thought Process:  Goal directed  Thought Content:  Normal  Suicidal:  None  Homicidal:  None  Hallucinations:  None  Delusion:  None  Memory:  Intact  Orientation:  Grossly intact  Reliability:  good  Insight:  Fair  Judgement:  Fair  Impulse Control:  Fair  Physical/Medical Issues:  Yes menopause        Patient's Support Network Includes:      Functional Status: Moderate impairment     Progress toward goal: Not at goal    Prognosis: Fair with Ongoing Treatment          Plan:    Patient will continue in individual outpatient therapy with focus on improved functioning and coping skills, maintaining stability, and avoiding decompensation and the need for higher level of care.    Patient will adhere to medication regimen as prescribed and report any side effects. Patient will contact this office, call 911 or present to the nearest emergency room should  suicidal or homicidal ideations occur. Provide Cognitive Behavioral Therapy and Solution Focused Therapy to improve functioning, maintain stability, and avoid decompensation and the need for higher level of care.     Return in about 4 weeks or earlier if symptoms worsen or fail to improve.           VISIT DIAGNOSIS:     ICD-10-CM ICD-9-CM   1. Generalized anxiety disorder  F41.1 300.02                        [facility] No Show Policy:  We understand unexpected circumstances arise; however, anytime you miss your appointment we are unable to provide you appropriate care.  In addition, each appointment missed could have been used to provide care for others.  We ask that you call at least 24 hours in advance to cancel or reschedule an appointment.  We would like to take this opportunity to remind you of our policy stating patients who miss THREE or more appointments without cancelling or rescheduling 24 hours in advance of the appointment may be subject to cancellation of any further visits with our clinic and recommendation to seek in-person services/visits.    Please call [phone] to reschedule your appointment. If there are reasons that make it difficult for you to keep the appointments, please call and let us know how we can help.  Please understand that medication prescribing will not continue without seeing your provider.      [facility]'s No Show Policy reviewed with patient at today's visit. Patient verbalized understanding of this policy. Discussed with patient that in the event that there are three or more no show visits, it will be recommended that they pursue in-person services/visits as noncompliance with telehealth visits indicates that patient is not an appropriate candidate for telemedicine and would likely be more appropriate for in-person services/visits. Patient verbalizes understanding and is agreeable to this.         This document has been  electronically signed by Francis Ferreira LCSW  October 8, 2024 09:25 EDT     Part of this note may be an electronic transcription/translation of spoken language to printed text using the Dragon Dictation System.

## 2024-10-09 ENCOUNTER — OFFICE VISIT (OUTPATIENT)
Dept: FAMILY MEDICINE CLINIC | Facility: CLINIC | Age: 54
End: 2024-10-09
Payer: COMMERCIAL

## 2024-10-09 VITALS
SYSTOLIC BLOOD PRESSURE: 115 MMHG | HEART RATE: 80 BPM | TEMPERATURE: 98.2 F | WEIGHT: 243 LBS | BODY MASS INDEX: 36.95 KG/M2 | DIASTOLIC BLOOD PRESSURE: 76 MMHG | OXYGEN SATURATION: 98 %

## 2024-10-09 DIAGNOSIS — G89.29 CHRONIC BACK PAIN, UNSPECIFIED BACK LOCATION, UNSPECIFIED BACK PAIN LATERALITY: Primary | ICD-10-CM

## 2024-10-09 DIAGNOSIS — G47.33 OSA (OBSTRUCTIVE SLEEP APNEA): ICD-10-CM

## 2024-10-09 DIAGNOSIS — G47.00 INSOMNIA, UNSPECIFIED TYPE: ICD-10-CM

## 2024-10-09 DIAGNOSIS — M54.9 CHRONIC BACK PAIN, UNSPECIFIED BACK LOCATION, UNSPECIFIED BACK PAIN LATERALITY: Primary | ICD-10-CM

## 2024-10-09 PROCEDURE — 99214 OFFICE O/P EST MOD 30 MIN: CPT | Performed by: NURSE PRACTITIONER

## 2024-10-09 RX ORDER — ESTRADIOL 0.1 MG/G
1 CREAM VAGINAL DAILY
COMMUNITY
Start: 2024-09-30

## 2024-10-09 RX ORDER — TIZANIDINE 2 MG/1
2 TABLET ORAL NIGHTLY PRN
Qty: 30 TABLET | Refills: 0 | Status: SHIPPED | OUTPATIENT
Start: 2024-10-09

## 2024-10-09 NOTE — PROGRESS NOTES
Subjective     Jimena Welch is a 54 y.o. female.     History of Present Illness  Pt is here today with c/o back and hip pain.  Pt states she is not working a lot right now so things have gotten better  She works at a nursery  She states the numbness is better now that she is not working.  She is having increased mid back pain.  She will sometimes get numbness in the left leg and the arm.  Rates the pain in her back a 6/10 when she is standing.  She states the pain sometimes intensifies with a bowel movement  Denies loss of bowel or bladder function  She takes meloxicam  Continues to have sleep issues  She is no longer using her CPAP as she was told she didn't need it anymore  Has tried melatonin and other OTC meds, elavil, trazodone, lorazepam, quiviviq          The following portions of the patient's history were reviewed and updated as appropriate: allergies, current medications, past family history, past medical history, past social history, past surgical history, and problem list.    Review of Systems   Constitutional:  Negative for chills, fatigue and fever.   Respiratory:  Negative for chest tightness and shortness of breath.    Cardiovascular:  Negative for chest pain and palpitations.   Musculoskeletal:  Positive for arthralgias, back pain and myalgias.   Neurological:  Positive for numbness (fingers and some in leg at times). Negative for dizziness and headache.   Psychiatric/Behavioral:  Positive for sleep disturbance.        Objective     /76 (BP Location: Left arm, Patient Position: Sitting, Cuff Size: Large Adult)   Pulse 80   Temp 98.2 °F (36.8 °C) (Tympanic)   Wt 110 kg (243 lb)   SpO2 98%   BMI 36.95 kg/m²     Current Outpatient Medications on File Prior to Visit   Medication Sig Dispense Refill    estradiol (ESTRACE) 0.1 MG/GM vaginal cream Insert 1 g into the vagina Daily.      albuterol sulfate  (90 Base) MCG/ACT inhaler       atorvastatin (LIPITOR) 10 MG tablet TAKE ONE TABLET  BY MOUTH DAILY 90 tablet 4    azelastine (ASTELIN) 0.1 % nasal spray       Estrogel 0.75 MG/1.25 GM (0.06%) topical gel       fluticasone (FLONASE) 50 MCG/ACT nasal spray into the nostril(s) as directed by provider.      Fluticasone Furoate 50 MCG/ACT aerosol powder  Inhale 1 puff Daily. Rinse mouth after each use. 30 each 5    hydrOXYzine (ATARAX) 10 MG tablet Take 1 tablet by mouth Daily.      levothyroxine (SYNTHROID, LEVOTHROID) 100 MCG tablet TAKE 1 TABLET BY MOUTH DAILY 30 tablet 3    meloxicam (MOBIC) 15 MG tablet Take 1 tablet by mouth Daily.      omeprazole (priLOSEC) 20 MG capsule Take 1 capsule by mouth Daily.      PARoxetine (PAXIL) 40 MG tablet Take 1 tablet by mouth Every Morning. 30 tablet 3    Prasterone 6.5 MG insert Insert 1 suppository into the vagina Daily.       No current facility-administered medications on file prior to visit.                 Physical Exam  Constitutional:       General: She is not in acute distress.     Appearance: Normal appearance. She is not ill-appearing.   HENT:      Head: Normocephalic and atraumatic.   Eyes:      Extraocular Movements: Extraocular movements intact.   Cardiovascular:      Rate and Rhythm: Normal rate and regular rhythm.      Heart sounds: No murmur heard.  Pulmonary:      Effort: Pulmonary effort is normal. No respiratory distress.   Musculoskeletal:         General: No tenderness. Normal range of motion.   Skin:     General: Skin is warm and dry.   Neurological:      General: No focal deficit present.      Mental Status: She is alert and oriented to person, place, and time.   Psychiatric:         Mood and Affect: Mood normal.         Behavior: Behavior normal.         Thought Content: Thought content normal.         Judgment: Judgment normal.           Assessment & Plan     Diagnoses and all orders for this visit:    1. Chronic back pain, unspecified back location, unspecified back pain laterality (Primary)  Comments:  get xray of T-spine and  L-spine  tizanidine  cont mobic  PT  Orders:  -     Ambulatory Referral to Physical Therapy for Evaluation & Treatment  -     XR Spine Thoracic 3 View; Future  -     XR Spine Lumbar 2 or 3 View; Future  -     tiZANidine (ZANAFLEX) 2 MG tablet; Take 1 tablet by mouth At Night As Needed for Muscle Spasms.  Dispense: 30 tablet; Refill: 0    2. JACINTO (obstructive sleep apnea)  Comments:  no longer uses CPAP  Orders:  -     Ambulatory Referral to Sleep Medicine    3. Insomnia, unspecified type  Comments:  needs new referral to sleep med  has tried numerous meds  Orders:  -     Ambulatory Referral to Sleep Medicine

## 2024-10-22 ENCOUNTER — TELEMEDICINE (OUTPATIENT)
Dept: PSYCHIATRY | Facility: CLINIC | Age: 54
End: 2024-10-22
Payer: COMMERCIAL

## 2024-10-22 DIAGNOSIS — F41.1 GENERALIZED ANXIETY DISORDER: Primary | ICD-10-CM

## 2024-10-22 NOTE — PROGRESS NOTES
"Date: October 22, 2024  Time In: 14:34  Time Out: 15:31    This provider is located at home address in connection with the [facility] [address], [place] [address] using a secure Telepathyhart Video Visit through [employer]. Patient is being seen remotely via telehealth at home address in [place] and stated they are in a secure environment for this session. The patient's condition being diagnosed/treated is appropriate for telemedicine. The provider identified himself as well as his credentials. The patient, and/or patients guardian, consent to be seen remotely, and when consent is given they understand that the consent allows for patient identifiable information to be sent to a third party as needed. They may refuse to be seen remotely at any time. The electronic data is encrypted and password protected, and the patient and/or guardian has been advised of the potential risks to privacy not withstanding such measures.  You have chosen to receive care through a telehealth visit.  Do you consent to use a video/audio connection for your medical care today? Yes    PROGRESS NOTE  Data:  Jimena Welch is a 54 y.o. female who presents today for follow up    Chief Complaint: anxiety    History of Present Illness: Reports ongoing stressors related to father's passing, difficulty with family relationships lacking, financial strain, and car continuing to be in shop. Reports enjoying freedom of not working currently and being able to watch what she wants and sleep when she wants.     Clinical Maneuvering/Intervention:  Assisted patient in processing above session content; acknowledged and normalized patient’s thoughts, feelings, and concerns.  Rationalized patient thought process regarding \"not making effort\" on her own anymore for relationship with family.  Discussed triggers associated with patient's anxiety.      Allowed patient to freely discuss issues without " interruption or judgment. Provided safe, confidential environment to facilitate the development of positive therapeutic relationship and encourage open, honest communication. Assisted patient in identifying risk factors which would indicate the need for higher level of care including thoughts to harm self or others and/or self-harming behavior and encouraged patient to contact this office, call 911, or present to the nearest emergency room should any of these events occur. Discussed crisis intervention services and means to access. Patient adamantly and convincingly denies current suicidal or homicidal ideation or perceptual disturbance.    Assessment:   Assessment   Patient appears to maintain relative stability as compared to their baseline.  However, patient continues to struggle with anxiety which continues to cause impairment in important areas of functioning.  As a result, they can be reasonably expected to continue to benefit from treatment and would likely be at increased risk for decompensation otherwise.    Mental Status Exam:   Hygiene:   good  Cooperation:  Cooperative  Eye Contact:  Good  Psychomotor Behavior:  Appropriate  Affect:  Blunted  Mood: sad and anxious  Speech:  Normal  Thought Process:  Goal directed  Thought Content:  Normal  Suicidal:  None  Homicidal:  None  Hallucinations:  None  Delusion:  None  Memory:  Intact  Orientation:  Grossly intact  Reliability:  good  Insight:  Fair  Judgement:  Fair  Impulse Control:  Fair  Physical/Medical Issues:  Yes menopause        Patient's Support Network Includes:      Functional Status: Moderate impairment     Progress toward goal: Not at goal    Prognosis: Fair with Ongoing Treatment          Plan:    Patient will continue in individual outpatient therapy with focus on improved functioning and coping skills, maintaining stability, and avoiding decompensation and the need for higher level of care.    Patient will adhere to medication regimen  as prescribed and report any side effects. Patient will contact this office, call 911 or present to the nearest emergency room should suicidal or homicidal ideations occur. Provide Cognitive Behavioral Therapy and Solution Focused Therapy to improve functioning, maintain stability, and avoid decompensation and the need for higher level of care.     Return in about 4 weeks or earlier if symptoms worsen or fail to improve.           VISIT DIAGNOSIS:     ICD-10-CM ICD-9-CM   1. Generalized anxiety disorder  F41.1 300.02                          [facility] No Show Policy:  We understand unexpected circumstances arise; however, anytime you miss your appointment we are unable to provide you appropriate care.  In addition, each appointment missed could have been used to provide care for others.  We ask that you call at least 24 hours in advance to cancel or reschedule an appointment.  We would like to take this opportunity to remind you of our policy stating patients who miss THREE or more appointments without cancelling or rescheduling 24 hours in advance of the appointment may be subject to cancellation of any further visits with our clinic and recommendation to seek in-person services/visits.    Please call [phone] to reschedule your appointment. If there are reasons that make it difficult for you to keep the appointments, please call and let us know how we can help.  Please understand that medication prescribing will not continue without seeing your provider.      [facility]'s No Show Policy reviewed with patient at today's visit. Patient verbalized understanding of this policy. Discussed with patient that in the event that there are three or more no show visits, it will be recommended that they pursue in-person services/visits as noncompliance with telehealth visits indicates that patient is not an appropriate candidate for telemedicine and would likely be more  appropriate for in-person services/visits. Patient verbalizes understanding and is agreeable to this.         This document has been electronically signed by Francis Ferreira LCSW  October 23, 2024 22:41 EDT     Part of this note may be an electronic transcription/translation of spoken language to printed text using the Dragon Dictation System.

## 2024-10-24 NOTE — PSYCHOTHERAPY NOTE
Allergies kicking butt, stayed in bed this morning    Was supposed to have lunch with friend, she doesn't seem to have the time    9/27 Dad passed that Friday    Just had the memorial Sunday    First 3 weeks so busy trying to get all the stuff together    First week relaxed/mourned the whole time was okay with it    All of family came up    Got to see 2 year old    Now brother wanting this and that    This is pain/suffering    Dont know if will see family, I will have to reach out to them    Really have such different perspectives of the world, don't have anything to talk about/common ground    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

## 2024-10-30 ENCOUNTER — TREATMENT (OUTPATIENT)
Dept: PHYSICAL THERAPY | Facility: CLINIC | Age: 54
End: 2024-10-30
Payer: COMMERCIAL

## 2024-10-30 DIAGNOSIS — M54.50 LUMBAR PAIN: ICD-10-CM

## 2024-10-30 DIAGNOSIS — M54.6 ACUTE BILATERAL THORACIC BACK PAIN: ICD-10-CM

## 2024-10-30 DIAGNOSIS — M54.9 BACK PAIN, UNSPECIFIED BACK LOCATION, UNSPECIFIED BACK PAIN LATERALITY, UNSPECIFIED CHRONICITY: Primary | ICD-10-CM

## 2024-10-30 NOTE — PROGRESS NOTES
Physical Therapy Initial Evaluation and Plan of Care      Patient: Jimena Welch   : 1970  Diagnosis/ICD-10 Code:  Back pain, unspecified back location, unspecified back pain laterality, unspecified chronicity [M54.9]  Referring practitioner: HIGINIO Avelar  Date of Initial Visit: 10/30/2024  Jimena Welch was seen by Andrew Lawler PT at [facility]  [facility]  [address] [place] [address] [place] [address]  Fax [phone]  Phone [phone]   Today's Date: 10/30/2024  Patient seen for 1 sessions           Subjective Questionnaire: Oswestry: 26%      Subjective Evaluation    History of Present Illness  Onset date: seasonal - more with allergies: recent episode: ~-.  Mechanism of injury: 55 y/o female with hx of thoracic and lumbar pain for a few yrs - recent episode may be related to seasonal allergies and coughing. No numbness or tingling presently - but hx of some numbness in past in L arm or leg.    Worse: thoracic struggling to breath (uses inhaler); LB with going to bathroom.    Better: occasionally feels better moving and working; resting    PMH and pertinent information reviewed in Epic.        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 Pain  Current pain ratin  At best pain ratin  At worst pain ratin  Quality: discomfort and sharp  Progression: no change    Social Support  Lives in: multiple-level home  Lives with: spouse    Hand dominance: right    Diagnostic Tests  Abnormal x-ray: pending.    Treatments  Previous treatment: chiropractic  Patient Goals  Patient goals for therapy: decreased pain             Objective          Postural Observations  Seated posture: poor  Standing posture: poor      Tenderness     Additional Tenderness Details  T6- T8 grossly and L5-S1 segments  Lumbar spine at L5-S1    Neurological Testing     Sensation   Cervical/Thoracic   Left   Intact: light touch    Right   Intact:  light touch    Lumbar   Left   Intact: light touch    Right   Intact: light touch    Reflexes   Left   Biceps (C5/C6): normal (2+)  Brachioradialis (C6): normal (2+)  Triceps (C7): normal (2+)  Patellar (L4): normal (2+)  Achilles (S1): normal (2+)    Right   Biceps (C5/C6): normal (2+)  Brachioradialis (C6): normal (2+)  Triceps (C7): normal (2+)  Patellar (L4): normal (2+)  Achilles (S1): normal (2+)    Active Range of Motion   Cervical/Thoracic Spine     Thoracic   Flexion: WFL  Extension: WFL  Left rotation: WFL  Right rotation: WFL and with pain    Additional Active Range of Motion Details  Mild pain lateral thoracic region with R thoracic Rotation    Strength/Myotome Testing     Lumbar   Left   Normal strength    Right   Normal strength    Muscle Activation   Patient able to activate left transverse abdominals, left multifidus, right transverse abdominals and right multifidus.     Additional Muscle Activation Details  Weak core       Access Code: [email or web address]  URL: [email or web address]  Date: 10/30/2024  Prepared by: Andrew Lawler    Exercises  - Prone Press Up  - 1-2 x daily - 7 x weekly - 2 sets - 10 reps  - Seated Scapular Retraction  - 1-2 x daily - 7 x weekly - 2 sets - 10 reps - hold hold  - Seated Thoracic Self-Mobilization  - 1 x daily - 7 x weekly - 1 sets - 10 reps - 3-5 sec hold    Assessment & Plan       Assessment  Impairments: abnormal or restricted ROM, activity intolerance, impaired physical strength, lacks appropriate home exercise program, pain with function and safety issue   Other impairment: defecation; coughing, sneezing; lifting  Functional limitations: carrying objects, lifting, sleeping, walking, uncomfortable because of pain, moving in bed, sitting and unable to perform repetitive tasks   Assessment details: Pt presents to PT with symptoms consistent with thoracic and lumbar pain: symptoms worse with coughing/sneezing and with defecation; activity can increase symptoms, but can  also help alleviate pain.  Pt would benefit from skilled PT intervention to address the deficits noted and has the potential to benefit from therapy.     Prognosis: good    Goals  Plan Goals: SHORT TERM GOALS: Time for Goal Achievement: 4 weeks  1.  Patient to be compliant and progression of HEP                             2.  Pain level < 4/10 at worst with mentioned activities to improve function  3.  Increased thoracic, lumbar and SIJ mobility to allow for increased lumbar AROM with less pain.  4.  Increased lumbar AROM to by 25% in all planes to allow for increased ease with sit-stand transfers and functional activities    LONG TERM GOALS: Time for Goal Achievement: 8 weeks  1.  Pt. to score < 10 % on Back Index  2.  Pain level < 1-2/10 with all listed activities to return to normal.  3.  Lumbar AROM to WFL to allow for return to household & recreational activities w/o increased symptoms  4.  (B) LE and lower abdominal strength to 5/5 to allow for pushing, pulling and activities to occur without pain (driving, sitting, household  & Job requirements)        Plan  Therapy options: will be seen for skilled therapy services  Planned modality interventions: cryotherapy, electrical stimulation/Russian stimulation, TENS, thermotherapy (hydrocollator packs), ultrasound and dry needling  Other planned modality interventions: Cupping  Planned therapy interventions: body mechanics training, flexibility, functional ROM exercises, home exercise program, manual therapy, neuromuscular re-education, postural training, spinal/joint mobilization, stretching, strengthening, soft tissue mobilization, therapeutic activities, motor coordination training and joint mobilization  Frequency: 2x week  Duration in visits: 12  Duration in weeks: 12  Treatment plan discussed with: patient        History # of Personal Factors and/or Comorbidities: MODERATE (1-2)  Examination of Body System(s): # of elements: MODERATE (3)  Clinical  Presentation: EVOLVING  Clinical Decision Making: MODERATE      Timed:         Manual Therapy:         mins  41708;     Therapeutic Exercise:     15    mins  77859;     Neuromuscular Chaitanya:       mins  18979;    Therapeutic Activity:          mins  94101;     Gait Training:           mins  10389;     Ultrasound:          mins  95834;    Ionto                                   mins   29813  Self Care                            mins   95143  Aquatic                              mins 98345      Un-Timed:  Canalith Repositioning __ mins 77170  Electrical Stimulation:     15    mins  71226 ( );  Dry Needling         mins self-pay  Traction          mins 86309  Low Eval          Mins  17643  Mod Eval    30      Mins  40017  High Eval                            Mins  54793  Re-Eval                              mins  50946        Timed Treatment:   15   mins   Total Treatment:      60  mins    PT SIGNATURE: Andrew Lawler PT, DPT   [place] License#: [id number]       Electronically signed by Andrew Lawler PT, 10/30/24, 2:14 PM EDT      Initial Certification  Certification Period:  10/30/2024 thru 1/27/2025  I certify that the therapy services are furnished while this patient is under my care.  The services outlined above are required by this patient, and will be reviewed every 90 days.       Physician Signature:__________________________________________________    PHYSICIAN: Carolin Calero APRN      DATE:     Please sign and return via fax to [phone].. Thank you, [facility].

## 2024-11-08 ENCOUNTER — TELEPHONE (OUTPATIENT)
Dept: PHYSICAL THERAPY | Facility: CLINIC | Age: 54
End: 2024-11-08

## 2024-11-12 ENCOUNTER — DOCUMENTATION (OUTPATIENT)
Dept: PHYSICAL THERAPY | Facility: CLINIC | Age: 54
End: 2024-11-12

## 2024-11-12 ENCOUNTER — TELEPHONE (OUTPATIENT)
Dept: PHYSICAL THERAPY | Facility: OTHER | Age: 54
End: 2024-11-12
Payer: COMMERCIAL

## 2024-11-12 NOTE — LETTER
Discharge Summary  Discharge Summary from Physical Therapy Report      Dates  PT visit: 10/30/24  Number of Visits: 1     Discharge Status of Patient: Patient called and canceled all appointments - no reason given.     Goals: Not Met    Discharge Plan: Patient to return to referring/providing physician      Date of Discharge 11/12/24        Andrew Lawler, PT, DPT  Physical Therapist

## 2024-11-12 NOTE — TELEPHONE ENCOUNTER
Caller: Jimena Welch    Relationship: Self    What was the call regarding: PATIENT CANCELLED APPT TODAY DUE TO NOT FEELING WELL

## 2024-12-16 ENCOUNTER — OFFICE VISIT (OUTPATIENT)
Dept: SLEEP MEDICINE | Facility: CLINIC | Age: 54
End: 2024-12-16
Payer: COMMERCIAL

## 2024-12-16 VITALS
DIASTOLIC BLOOD PRESSURE: 66 MMHG | WEIGHT: 251 LBS | HEART RATE: 84 BPM | SYSTOLIC BLOOD PRESSURE: 115 MMHG | HEIGHT: 68 IN | OXYGEN SATURATION: 96 % | BODY MASS INDEX: 38.04 KG/M2

## 2024-12-16 DIAGNOSIS — G47.30 SLEEP APNEA, UNSPECIFIED TYPE: Primary | ICD-10-CM

## 2024-12-16 DIAGNOSIS — R06.83 SNORING: ICD-10-CM

## 2024-12-16 DIAGNOSIS — E66.9 OBESITY (BMI 30-39.9): ICD-10-CM

## 2024-12-16 DIAGNOSIS — G47.8 NON-RESTORATIVE SLEEP: ICD-10-CM

## 2024-12-16 DIAGNOSIS — G47.00 INSOMNIA, UNSPECIFIED TYPE: ICD-10-CM

## 2024-12-16 DIAGNOSIS — G47.10 HYPERSOMNIA: ICD-10-CM

## 2024-12-16 PROCEDURE — 99204 OFFICE O/P NEW MOD 45 MIN: CPT | Performed by: FAMILY MEDICINE

## 2024-12-16 PROCEDURE — G0463 HOSPITAL OUTPT CLINIC VISIT: HCPCS

## 2024-12-16 NOTE — PROGRESS NOTES
[facility] New Patient/Consultation       Reason for Consultation: JACINTO      Patient Care Team:  Carolin Calero APRN as PCP - General (Nurse Practitioner)  Sharan Meredith MD as Consulting Physician (Cardiology)  Francis Ferreira LCSW as  (Psychiatry)  Osvaldo Sunshine MD as Consulting Physician (Sleep Medicine)      History of present illness:  Thank you for asking me to see your patient.  The patient is a 54 y.o. femalepresents today to establish care for JACINTO.  Reports history of JACINTO and history of using CPAP however was unable to get into usage as her mother was hospitalized at the time.  Would like to be reassessed in start care if she still has JACINTO.  Last tested in 2015.  Sleep latency can be hours can sleep 10 to 13 hours at a time 0 naps no rotating shifts.  Reports hypersomnia nonrestorative sleep weight changes over the past 5 years snoring waking up coughing/choking morning headaches pain disrupting sleep sweating during sleep which she relates to menopause nocturia 3-4 times a night restless sleep more sleepy when she increase her sleep time.  BMI 38.2.    Medical Conditions (PMH):   Difficulty concentrating  Poor memory  Anxiety  Thyroid disease  Asthma  Arthritis    Social history:  Do you drive a commercial vehicle:  No   Shift work:  No   Tobacco use:  No   Alcohol use: 1-2 per week  Caffeinated drinks: 2 per day  Occupation:     Family History (parents and siblings) (pertaining to sleep medicine):  Daytime sleepiness  Thyroid disorder  Asthma  JACINTO    Allergies:  Patient has no known allergies.       Current Outpatient Medications:     albuterol sulfate  (90 Base) MCG/ACT inhaler, , Disp: , Rfl:     atorvastatin (LIPITOR) 10 MG tablet, TAKE ONE TABLET BY MOUTH DAILY, Disp: 90 tablet, Rfl: 4    azelastine (ASTELIN) 0.1 % nasal spray, , Disp: , Rfl:     estradiol (ESTRACE) 0.1 MG/GM vaginal cream, Insert 1 g into the vagina Daily., Disp: , Rfl:     Estrogel  "0.75 MG/1.25 GM (0.06%) topical gel, , Disp: , Rfl:     fluticasone (FLONASE) 50 MCG/ACT nasal spray, into the nostril(s) as directed by provider., Disp: , Rfl:     Fluticasone Furoate 50 MCG/ACT aerosol powder , Inhale 1 puff Daily. Rinse mouth after each use., Disp: 30 each, Rfl: 5    hydrOXYzine (ATARAX) 10 MG tablet, Take 1 tablet by mouth Daily., Disp: , Rfl:     levothyroxine (SYNTHROID, LEVOTHROID) 100 MCG tablet, TAKE 1 TABLET BY MOUTH DAILY, Disp: 30 tablet, Rfl: 3    meloxicam (MOBIC) 15 MG tablet, Take 1 tablet by mouth Daily., Disp: , Rfl:     omeprazole (priLOSEC) 20 MG capsule, Take 1 capsule by mouth Daily., Disp: , Rfl:     PARoxetine (PAXIL) 40 MG tablet, Take 1 tablet by mouth Every Morning., Disp: 30 tablet, Rfl: 3    Prasterone 6.5 MG insert, Insert 1 suppository into the vagina Daily., Disp: , Rfl:     tiZANidine (ZANAFLEX) 2 MG tablet, Take 1 tablet by mouth At Night As Needed for Muscle Spasms., Disp: 30 tablet, Rfl: 0    Vital Signs:    Vitals:    12/16/24 1300   BP: 115/66   BP Location: Left arm   Patient Position: Sitting   Pulse: 84   SpO2: 96%   Weight: 114 kg (251 lb)   Height: 172.7 cm (68\")      Body mass index is 38.16 kg/m².  Neck Circumference: 16.5 inches      REVIEW OF SYSTEMS:  Pertinent positive symptoms are:  Snoring  Witnessed apnea  [place] Sleepiness Scale of Total score: 7   Fatigue  Dizziness  Anxiety  Depression  Shortness of breath  Frequent urination  Nasal congestion  Joint pain  Frequent heartburn      Physical exam:  Vitals:    12/16/24 1300   BP: 115/66   BP Location: Left arm   Patient Position: Sitting   Pulse: 84   SpO2: 96%   Weight: 114 kg (251 lb)   Height: 172.7 cm (68\")    Body mass index is 38.16 kg/m². Neck Circumference: 16.5 inches  HEENT: Head is atraumatic, normocephalic  Eyes: pupils are round equal and reacting to light and accommodation, conjunctiva normal  Throat: tongue normal  NECK:Neck Circumference: 16.5 inches  RESPIRATORY SYSTEM: Regular " respirations  CARDIOVASULAR SYSTEM: Regular rate  EXTREMITES: No cyanosis, clubbing  NEUROLOGICAL SYSTEM: Oriented x 3, no gross motor defects, gait normal      Impression:  1. Sleep apnea, unspecified type    2. Hypersomnia    3. Non-restorative sleep    4. Obesity (BMI 30-39.9)    5. Snoring    6. Insomnia, unspecified type        Plan:    Office note(s) from care team reviewed. Office note(s) reviewed: 10/9/2024 PCP    Labs/ Test Results Reviewed:  TSH          6/20/2024    14:39   TSH   TSH 0.988        TSH normal          ASSESSMENT AND PLAN:   Witnessed apnea: patient's symptoms and physical examination are concerning for possible sleep apnea.   I discussed the signs, symptoms, and pathophysiology of sleep apnea with this patient.  I also discussed the potential complications of untreated sleep apnea including but not limited to resistant hypertension, insulin resistance, pulmonary hypertension, atrial fibrillation, heart attack, stroke, nonrestorative sleep with hypersomnia which can increase risk for motor vehicle accidents, etc.   Different testing methods including home-based and lab based sleep studies were discussed with this patient.   Based on patient history and physical examination, will proceed with HST.  The order for the sleep study is placed in [facility].  The test will be scheduled after prior authorization has been obtained through patient's insurance.  Treatment and management will be discussed in more detail with this patient after the test is completed.  All questions were answered to patient's satisfaction.   Snoring: snoring is the sound created by turbulent airflow vibrating upper airway soft tissue.  I have also discussed factors affecting snoring including sleep deprivation, sleeping on the back and alcohol ingestion. To minimize snoring, patient is advised to have adequate sleep, sleep on their side, and avoid alcohol and sedative medications around bedtime.   Excessive daytime sleepiness:   [place] Sleepiness Scale of Total score: 7.  There are many causes of excessive daytime sleepiness.  Rule out sleep apnea as a contributing factor, as above.  Do not drive, operate heavy machinery, or do activities that require high concentration if feeling tired/drowsy.  Obesity: Body mass index is 38.16 kg/m².. Patients who are overweight or obese are at increased risk of sleep apnea/ sleep disordered breathing. Weight reduction and healthy lifestyle are encouraged in overweight/ obese patients as part of a comprehensive approach to sleep apnea treatment.    Insomnia:     I have also discussed with the patient the following  Sleep hygiene: try to maintain a regular bed time and wake time, avoid watching TV/ using electronic devices in bed (including cell phones), limit caffeinated and alcoholic beverages before bed, try to maintain a cool and quiet sleep environment, avoid daytime naps  Adequate amount of sleep: most people need around 7 to 8 hours of sleep each night      Patient will follow-up after study, 31 to 90 days after PAP therapy initiated if applicable, or contact the office sooner for questions or concerns. Patient's questions were answered.      Thank you for allowing me to participate in your patient's care.    Osvaldo Sunshine MD  Sleep Medicine  12/16/24  14:02 EST

## 2024-12-30 RX ORDER — LEVOTHYROXINE SODIUM 100 UG/1
100 TABLET ORAL DAILY
Qty: 30 TABLET | Refills: 3 | Status: SHIPPED | OUTPATIENT
Start: 2024-12-30

## 2025-01-08 ENCOUNTER — HOSPITAL ENCOUNTER (OUTPATIENT)
Dept: SLEEP MEDICINE | Facility: HOSPITAL | Age: 55
Discharge: HOME | End: 2025-01-08
Admitting: FAMILY MEDICINE
Payer: COMMERCIAL

## 2025-01-08 DIAGNOSIS — G47.00 INSOMNIA, UNSPECIFIED TYPE: ICD-10-CM

## 2025-01-08 DIAGNOSIS — G47.8 NON-RESTORATIVE SLEEP: ICD-10-CM

## 2025-01-08 DIAGNOSIS — G47.30 SLEEP APNEA, UNSPECIFIED TYPE: ICD-10-CM

## 2025-01-08 DIAGNOSIS — R06.83 SNORING: ICD-10-CM

## 2025-01-08 DIAGNOSIS — E66.9 OBESITY (BMI 30-39.9): ICD-10-CM

## 2025-01-08 DIAGNOSIS — G47.10 HYPERSOMNIA: ICD-10-CM

## 2025-01-08 PROCEDURE — G0399 HOME SLEEP TEST/TYPE 3 PORTA: HCPCS

## 2025-01-20 DIAGNOSIS — G47.10 HYPERSOMNIA: ICD-10-CM

## 2025-01-20 DIAGNOSIS — G47.8 NON-RESTORATIVE SLEEP: ICD-10-CM

## 2025-01-20 DIAGNOSIS — G47.00 INSOMNIA, UNSPECIFIED TYPE: ICD-10-CM

## 2025-01-20 DIAGNOSIS — G47.33 OBSTRUCTIVE SLEEP APNEA: Primary | ICD-10-CM

## 2025-01-20 DIAGNOSIS — R06.83 SNORING: ICD-10-CM

## 2025-01-20 DIAGNOSIS — E66.9 OBESITY (BMI 30-39.9): ICD-10-CM

## 2025-03-25 NOTE — PROGRESS NOTES
Subjective     Jimena Welch is a 55 y.o. female.     History of Present Illness  Pt is here today for her annual CPE.  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   She is wanting to discuss weight loss options  She also complains of SOA     Hypothyroidism-  Had her thyroid removed.  Had nodules when she was 11.    She is currently taking synthroid 100 mcg. She is doing well on the medication at this time.  She does not see endocrinology.      Interstitial Cystitis-  Sees urologynecology-Takes pyridium as needed.  Watches her diet. She is doing ok at this time. She had surgery on 12/23/22 - cystohydro.      Hormone replacement- Sees Dr. Lezama. She is on Prasterone and estrogen. She is feeling much better.      GERD- takes 20mg omeprazole occasionally.  It is controlled fine.     Anxiety- she is currently on paxil 20mg daily. It is working well. She is doing well on the medication. Denies SI or HI.  Has tries quivivic, temazipam, ambien, pristiq, zoloft, and valium.       Hyperlipidemia- Takes 10mg lipitor. She is tolerating it well.       Sleep apnea- she has been using her CPAP. Sees sleep medicine.      IBS- she is taking a probiotic. It is helping.       RA/Hand pain- she is now seeing rheumatology. She is on meloxicam. She has RA. She is stable, does have some pain intermittently.    Asthma- has albuterol. Uses daily or every other day.      Labs- due  Pap smear- UTD had hysterectomy- doesn't need paps- sees GYN  Mammogram-  1/22/25  DEXA- due  Colonoscopy- cologuard 1/5/22- due     Vaccines:  Flu-  UTD  PNA- n/a  Shingles- due  Tdap- UTD     Dental exam- due  Eye exam- utd               The following portions of the patient's history were reviewed and updated as appropriate: allergies, current medications, past family history, past medical history, past social history, past  "surgical history, and problem list.    Review of Systems   Constitutional:  Negative for appetite change, chills, fatigue and fever.   HENT:  Positive for postnasal drip. Negative for congestion, ear pain, hearing loss, rhinorrhea, sinus pressure, sore throat, swollen glands and trouble swallowing.    Eyes:  Negative for blurred vision, double vision, pain, discharge, itching and visual disturbance.   Respiratory:  Positive for cough (in morning). Negative for chest tightness, shortness of breath and wheezing.    Cardiovascular:  Negative for chest pain and palpitations.   Gastrointestinal:  Positive for diarrhea (occaisonal). Negative for abdominal pain, blood in stool, constipation, nausea and vomiting.   Endocrine: Negative for polydipsia, polyphagia and polyuria.   Genitourinary:  Negative for breast lump, breast pain, dysuria, flank pain, frequency, urgency, vaginal discharge and vaginal pain.   Musculoskeletal:  Positive for arthralgias.   Skin:  Negative for rash and skin lesions.   Neurological:  Negative for dizziness, weakness, numbness and headache.   Psychiatric/Behavioral:  Negative for depressed mood and stress. The patient is not nervous/anxious.        Objective     /68   Pulse 80   Temp 98 °F (36.7 °C) (Tympanic)   Ht 171.5 cm (67.5\")   Wt 118 kg (260 lb)   SpO2 99%   BMI 40.12 kg/m²     Current Outpatient Medications on File Prior to Visit   Medication Sig Dispense Refill    PARoxetine (PAXIL) 20 MG tablet Take 1 tablet by mouth Every Morning.      albuterol sulfate  (90 Base) MCG/ACT inhaler       atorvastatin (LIPITOR) 10 MG tablet TAKE ONE TABLET BY MOUTH DAILY 90 tablet 4    azelastine (ASTELIN) 0.1 % nasal spray       estradiol (ESTRACE) 0.1 MG/GM vaginal cream Insert 1 g into the vagina Daily.      Estrogel 0.75 MG/1.25 GM (0.06%) topical gel       fluticasone (FLONASE) 50 MCG/ACT nasal spray into the nostril(s) as directed by provider.      Fluticasone Furoate 50 MCG/ACT " aerosol powder  Inhale 1 puff Daily. Rinse mouth after each use. 30 each 5    levothyroxine (SYNTHROID, LEVOTHROID) 100 MCG tablet TAKE 1 TABLET BY MOUTH DAILY 30 tablet 3    meloxicam (MOBIC) 15 MG tablet Take 1 tablet by mouth Daily.      omeprazole (priLOSEC) 20 MG capsule Take 1 capsule by mouth Daily.      Prasterone 6.5 MG insert Insert 1 suppository into the vagina Daily.      tiZANidine (ZANAFLEX) 2 MG tablet Take 1 tablet by mouth At Night As Needed for Muscle Spasms. 30 tablet 0    [DISCONTINUED] PARoxetine (PAXIL) 40 MG tablet Take 1 tablet by mouth Every Morning. 30 tablet 3     No current facility-administered medications on file prior to visit.                 Physical Exam  Vitals reviewed.   Constitutional:       General: She is not in acute distress.     Appearance: Normal appearance. She is well-developed. She is not diaphoretic.   HENT:      Head: Normocephalic and atraumatic.      Right Ear: Tympanic membrane and ear canal normal.      Left Ear: Tympanic membrane and ear canal normal.      Nose: No congestion or rhinorrhea.      Mouth/Throat:      Pharynx: No oropharyngeal exudate or posterior oropharyngeal erythema.   Eyes:      General:         Right eye: No discharge.         Left eye: No discharge.      Extraocular Movements: Extraocular movements intact.      Conjunctiva/sclera: Conjunctivae normal.   Cardiovascular:      Rate and Rhythm: Normal rate and regular rhythm.      Heart sounds: No murmur heard.  Pulmonary:      Effort: Pulmonary effort is normal. No respiratory distress.      Breath sounds: Normal breath sounds. No wheezing or rales.   Abdominal:      General: Bowel sounds are normal.      Palpations: Abdomen is soft.   Musculoskeletal:         General: Normal range of motion.      Cervical back: Normal range of motion.   Skin:     General: Skin is warm and dry.   Neurological:      General: No focal deficit present.      Mental Status: She is alert and oriented to person, place,  and time.   Psychiatric:         Mood and Affect: Mood normal.         Behavior: Behavior normal.         Thought Content: Thought content normal.         Judgment: Judgment normal.           Assessment & Plan     Diagnoses and all orders for this visit:    1. Preventative health care (Primary)  Comments:  doing well  work on diet and exercise  get DEXA  cologuard  check labs  Orders:  -     Comprehensive Metabolic Panel; Future  -     Lipid Panel; Future  -     TSH; Future    2. Colon cancer screening  -     Cologuard - Stool, Per Rectum; Future    3. JACINTO (obstructive sleep apnea)  Comments:  uses CPAP  sees sleep med  would benefit from Zepbound  Orders:  -     Tirzepatide-Weight Management (ZEPBOUND) 2.5 MG/0.5ML solution auto-injector; Inject 0.5 mL under the skin into the appropriate area as directed 1 (One) Time Per Week.  Dispense: 2 mL; Refill: 0    4. Hyperlipidemia, unspecified hyperlipidemia type  Comments:  stable  cont lipitor  work on diet and exercise  check labs  Orders:  -     Comprehensive Metabolic Panel; Future  -     Lipid Panel; Future    5. Hypothyroidism, unspecified type  Comments:  stable  cont levothyroxine  check lab  Orders:  -     Lipid Panel; Future    6. Anxiety and depression  Comments:  stable  cont paxil   denies SI or HI    7. Interstitial cystitis  Comments:  stable  sees   cont diet    8. Rheumatoid arthritis, involving unspecified site, unspecified whether rheumatoid factor present  Comments:  stable  sees rheum  work on weight loss    9. Post-menopausal  -     DEXA Bone Density Axial; Future    10. Moderate persistent asthma, unspecified whether complicated  Comments:  using albuterol frequently  try airsupra  Orders:  -     Albuterol-Budesonide (Airsupra) 90-80 MCG/ACT aerosol; Inhale 2 Inhalations Every 4 (Four) Hours As Needed (shortness of air).  Dispense: 10.7 g; Refill: 1    11. Class 3 severe obesity with serious comorbidity and body mass index (BMI) of 40.0 to 44.9  in adult, unspecified obesity type  Comments:  work on diet and exercise  has JACINTO  would benefit from zepbound  Orders:  -     Tirzepatide-Weight Management (ZEPBOUND) 2.5 MG/0.5ML solution auto-injector; Inject 0.5 mL under the skin into the appropriate area as directed 1 (One) Time Per Week.  Dispense: 2 mL; Refill: 0

## 2025-03-27 ENCOUNTER — OFFICE VISIT (OUTPATIENT)
Dept: FAMILY MEDICINE CLINIC | Facility: CLINIC | Age: 55
End: 2025-03-27
Payer: COMMERCIAL

## 2025-03-27 ENCOUNTER — TELEPHONE (OUTPATIENT)
Dept: FAMILY MEDICINE CLINIC | Facility: CLINIC | Age: 55
End: 2025-03-27

## 2025-03-27 ENCOUNTER — LAB (OUTPATIENT)
Dept: FAMILY MEDICINE CLINIC | Facility: CLINIC | Age: 55
End: 2025-03-27
Payer: COMMERCIAL

## 2025-03-27 VITALS
HEIGHT: 68 IN | DIASTOLIC BLOOD PRESSURE: 68 MMHG | OXYGEN SATURATION: 99 % | WEIGHT: 260 LBS | BODY MASS INDEX: 39.4 KG/M2 | SYSTOLIC BLOOD PRESSURE: 127 MMHG | TEMPERATURE: 98 F | HEART RATE: 80 BPM

## 2025-03-27 DIAGNOSIS — E78.5 HYPERLIPIDEMIA, UNSPECIFIED HYPERLIPIDEMIA TYPE: ICD-10-CM

## 2025-03-27 DIAGNOSIS — F41.9 ANXIETY AND DEPRESSION: ICD-10-CM

## 2025-03-27 DIAGNOSIS — E03.9 HYPOTHYROIDISM, UNSPECIFIED TYPE: ICD-10-CM

## 2025-03-27 DIAGNOSIS — Z00.00 PREVENTATIVE HEALTH CARE: ICD-10-CM

## 2025-03-27 DIAGNOSIS — J45.40 MODERATE PERSISTENT ASTHMA, UNSPECIFIED WHETHER COMPLICATED: ICD-10-CM

## 2025-03-27 DIAGNOSIS — Z00.00 PREVENTATIVE HEALTH CARE: Primary | ICD-10-CM

## 2025-03-27 DIAGNOSIS — G47.33 OSA (OBSTRUCTIVE SLEEP APNEA): ICD-10-CM

## 2025-03-27 DIAGNOSIS — F32.A ANXIETY AND DEPRESSION: ICD-10-CM

## 2025-03-27 DIAGNOSIS — E66.813 CLASS 3 SEVERE OBESITY WITH SERIOUS COMORBIDITY AND BODY MASS INDEX (BMI) OF 40.0 TO 44.9 IN ADULT, UNSPECIFIED OBESITY TYPE: ICD-10-CM

## 2025-03-27 DIAGNOSIS — N30.10 INTERSTITIAL CYSTITIS: ICD-10-CM

## 2025-03-27 DIAGNOSIS — Z78.0 POST-MENOPAUSAL: ICD-10-CM

## 2025-03-27 DIAGNOSIS — E66.01 CLASS 3 SEVERE OBESITY WITH SERIOUS COMORBIDITY AND BODY MASS INDEX (BMI) OF 40.0 TO 44.9 IN ADULT, UNSPECIFIED OBESITY TYPE: ICD-10-CM

## 2025-03-27 DIAGNOSIS — Z12.11 COLON CANCER SCREENING: ICD-10-CM

## 2025-03-27 DIAGNOSIS — M06.9 RHEUMATOID ARTHRITIS, INVOLVING UNSPECIFIED SITE, UNSPECIFIED WHETHER RHEUMATOID FACTOR PRESENT: ICD-10-CM

## 2025-03-27 LAB
ALBUMIN SERPL-MCNC: 3.9 G/DL (ref 3.5–5.2)
ALBUMIN/GLOB SERPL: 1.4 G/DL
ALP SERPL-CCNC: 88 U/L (ref 39–117)
ALT SERPL W P-5'-P-CCNC: 23 U/L (ref 1–33)
ANION GAP SERPL CALCULATED.3IONS-SCNC: 12 MMOL/L (ref 5–15)
AST SERPL-CCNC: 15 U/L (ref 1–32)
BILIRUB SERPL-MCNC: 0.3 MG/DL (ref 0–1.2)
BUN SERPL-MCNC: 13 MG/DL (ref 6–20)
BUN/CREAT SERPL: 15.3 (ref 7–25)
CALCIUM SPEC-SCNC: 9.3 MG/DL (ref 8.6–10.5)
CHLORIDE SERPL-SCNC: 103 MMOL/L (ref 98–107)
CHOLEST SERPL-MCNC: 199 MG/DL (ref 0–200)
CO2 SERPL-SCNC: 28 MMOL/L (ref 22–29)
CREAT SERPL-MCNC: 0.85 MG/DL (ref 0.57–1)
EGFRCR SERPLBLD CKD-EPI 2021: 80 ML/MIN/1.73
GLOBULIN UR ELPH-MCNC: 2.8 GM/DL
GLUCOSE SERPL-MCNC: 120 MG/DL (ref 65–99)
HDLC SERPL-MCNC: 46 MG/DL (ref 40–60)
LDLC SERPL CALC-MCNC: 116 MG/DL (ref 0–100)
LDLC/HDLC SERPL: 2.41 {RATIO}
POTASSIUM SERPL-SCNC: 3.7 MMOL/L (ref 3.5–5.2)
PROT SERPL-MCNC: 6.7 G/DL (ref 6–8.5)
SODIUM SERPL-SCNC: 143 MMOL/L (ref 136–145)
TRIGL SERPL-MCNC: 211 MG/DL (ref 0–150)
TSH SERPL DL<=0.05 MIU/L-ACNC: 0.99 UIU/ML (ref 0.27–4.2)
VLDLC SERPL-MCNC: 37 MG/DL (ref 5–40)

## 2025-03-27 PROCEDURE — 84443 ASSAY THYROID STIM HORMONE: CPT | Performed by: NURSE PRACTITIONER

## 2025-03-27 PROCEDURE — 80061 LIPID PANEL: CPT | Performed by: NURSE PRACTITIONER

## 2025-03-27 PROCEDURE — 80053 COMPREHEN METABOLIC PANEL: CPT | Performed by: NURSE PRACTITIONER

## 2025-03-27 PROCEDURE — 36415 COLL VENOUS BLD VENIPUNCTURE: CPT

## 2025-03-27 RX ORDER — ALBUTEROL SULFATE AND BUDESONIDE 90; 80 UG/1; UG/1
2 AEROSOL, METERED RESPIRATORY (INHALATION) EVERY 4 HOURS PRN
Qty: 10.7 G | Refills: 1 | Status: SHIPPED | OUTPATIENT
Start: 2025-03-27

## 2025-03-27 RX ORDER — PAROXETINE 20 MG/1
20 TABLET, FILM COATED ORAL EVERY MORNING
COMMUNITY
Start: 2025-02-28

## 2025-03-27 NOTE — TELEPHONE ENCOUNTER
Prior Auth completed for Zepbound 2.5MG/0.5ML pen-injectors via covermymeds. Pending determination within 72 hours minium.     (Key: [id number])  Rx #: [id number]    [employer] Electronic Prior Authorization Form    Sent with most recent chart notes, Summary of Care, medications list, any known allergies, and lab results.

## 2025-03-28 NOTE — TELEPHONE ENCOUNTER
Prior Authorization for Zepbound 2.5MG/0.5ML pen-injectors APPROVED.     How long does this approval last?    ZEPBOUND INJ 2.5/0.5, use as directed (2 per 28 days), is approved through 03/27/2026 or until coverage for the medication is no longer available under your benefit plan or the medication becomes subject to a pharmacy benefit coverage requirement, such as supply limits or notification, whichever occurs first as allowed by law. Please note: Doses/quantities above plan limits and/or maximum Food and Drug Administration (FDA) approved dosing may be subject to further review.        Faxed approval to pharmacy.

## 2025-04-16 DIAGNOSIS — J45.40 MODERATE PERSISTENT ASTHMA, UNSPECIFIED WHETHER COMPLICATED: ICD-10-CM

## 2025-04-16 RX ORDER — ALBUTEROL SULFATE AND BUDESONIDE 90; 80 UG/1; UG/1
2 AEROSOL, METERED RESPIRATORY (INHALATION) EVERY 4 HOURS PRN
Qty: 10.7 G | Refills: 1 | Status: SHIPPED | OUTPATIENT
Start: 2025-04-16

## 2025-04-21 DIAGNOSIS — G47.33 OSA (OBSTRUCTIVE SLEEP APNEA): ICD-10-CM

## 2025-04-21 DIAGNOSIS — E66.813 CLASS 3 SEVERE OBESITY WITH SERIOUS COMORBIDITY AND BODY MASS INDEX (BMI) OF 40.0 TO 44.9 IN ADULT, UNSPECIFIED OBESITY TYPE: ICD-10-CM

## 2025-04-21 DIAGNOSIS — E66.01 CLASS 3 SEVERE OBESITY WITH SERIOUS COMORBIDITY AND BODY MASS INDEX (BMI) OF 40.0 TO 44.9 IN ADULT, UNSPECIFIED OBESITY TYPE: ICD-10-CM

## 2025-04-28 RX ORDER — LEVOTHYROXINE SODIUM 100 UG/1
100 TABLET ORAL DAILY
Qty: 90 TABLET | Refills: 0 | Status: SHIPPED | OUTPATIENT
Start: 2025-04-28

## 2025-05-01 DIAGNOSIS — J45.40 MODERATE PERSISTENT ASTHMA, UNSPECIFIED WHETHER COMPLICATED: ICD-10-CM

## 2025-05-01 RX ORDER — ALBUTEROL SULFATE AND BUDESONIDE 90; 80 UG/1; UG/1
2 AEROSOL, METERED RESPIRATORY (INHALATION) EVERY 4 HOURS PRN
Qty: 10.7 G | Refills: 1 | Status: SHIPPED | OUTPATIENT
Start: 2025-05-01

## 2025-05-11 DIAGNOSIS — E66.01 CLASS 3 SEVERE OBESITY WITH SERIOUS COMORBIDITY AND BODY MASS INDEX (BMI) OF 40.0 TO 44.9 IN ADULT, UNSPECIFIED OBESITY TYPE: ICD-10-CM

## 2025-05-11 DIAGNOSIS — G47.33 OSA (OBSTRUCTIVE SLEEP APNEA): ICD-10-CM

## 2025-05-11 DIAGNOSIS — E66.813 CLASS 3 SEVERE OBESITY WITH SERIOUS COMORBIDITY AND BODY MASS INDEX (BMI) OF 40.0 TO 44.9 IN ADULT, UNSPECIFIED OBESITY TYPE: ICD-10-CM

## 2025-05-31 DIAGNOSIS — J45.40 MODERATE PERSISTENT ASTHMA, UNSPECIFIED WHETHER COMPLICATED: ICD-10-CM

## 2025-06-01 RX ORDER — ALBUTEROL SULFATE AND BUDESONIDE 90; 80 UG/1; UG/1
2 AEROSOL, METERED RESPIRATORY (INHALATION) EVERY 4 HOURS PRN
Qty: 10.7 G | Refills: 1 | Status: SHIPPED | OUTPATIENT
Start: 2025-06-01

## 2025-06-03 RX ORDER — ATORVASTATIN CALCIUM 10 MG/1
10 TABLET, FILM COATED ORAL DAILY
Qty: 90 TABLET | Refills: 4 | Status: SHIPPED | OUTPATIENT
Start: 2025-06-03

## 2025-06-04 ENCOUNTER — OFFICE VISIT (OUTPATIENT)
Dept: ORTHOPEDIC SURGERY | Facility: CLINIC | Age: 55
End: 2025-06-04
Payer: COMMERCIAL

## 2025-06-04 VITALS — HEIGHT: 67 IN | WEIGHT: 235 LBS | BODY MASS INDEX: 36.88 KG/M2 | RESPIRATION RATE: 20 BRPM

## 2025-06-04 DIAGNOSIS — M70.62 TROCHANTERIC BURSITIS OF LEFT HIP: ICD-10-CM

## 2025-06-04 DIAGNOSIS — M25.552 LEFT HIP PAIN: Primary | ICD-10-CM

## 2025-06-04 RX ORDER — TRIAMCINOLONE ACETONIDE 40 MG/ML
80 INJECTION, SUSPENSION INTRA-ARTICULAR; INTRAMUSCULAR
Status: COMPLETED | OUTPATIENT
Start: 2025-06-04 | End: 2025-06-04

## 2025-06-04 RX ORDER — LIDOCAINE HYDROCHLORIDE 10 MG/ML
2 INJECTION, SOLUTION EPIDURAL; INFILTRATION; INTRACAUDAL; PERINEURAL
Status: COMPLETED | OUTPATIENT
Start: 2025-06-04 | End: 2025-06-04

## 2025-06-04 RX ADMIN — LIDOCAINE HYDROCHLORIDE 2 ML: 10 INJECTION, SOLUTION EPIDURAL; INFILTRATION; INTRACAUDAL; PERINEURAL at 15:48

## 2025-06-04 RX ADMIN — TRIAMCINOLONE ACETONIDE 80 MG: 40 INJECTION, SUSPENSION INTRA-ARTICULAR; INTRAMUSCULAR at 15:48

## 2025-06-04 NOTE — PROGRESS NOTES
INJECTION VISIT    Patient: Jimena Welch    YOB: 1970    MRN: [id number]    Chief Complaint   Patient presents with    Left Hip - Follow-up       History of Present Illness:   Jimena Welch is a 55 y.o. year old who presents today for injection of the left hip. Last injection was ~ 6 months ago and provided good relief of symptoms.         Allergies: No Known Allergies    Medications:   Home Medications:  Current Outpatient Medications on File Prior to Visit   Medication Sig    Airsupra 90-80 MCG/ACT aerosol INHALE 2 PUFFS BY MOUTH EVERY 4 HOURS AS NEEDED FOR SHORTNESS OF AIR    albuterol sulfate  (90 Base) MCG/ACT inhaler     atorvastatin (LIPITOR) 10 MG tablet TAKE 1 TABLET BY MOUTH DAILY    azelastine (ASTELIN) 0.1 % nasal spray     estradiol (ESTRACE) 0.1 MG/GM vaginal cream Insert 1 g into the vagina Daily.    Estrogel 0.75 MG/1.25 GM (0.06%) topical gel     fluticasone (FLONASE) 50 MCG/ACT nasal spray into the nostril(s) as directed by provider.    Fluticasone Furoate 50 MCG/ACT aerosol powder  Inhale 1 puff Daily. Rinse mouth after each use.    levothyroxine (SYNTHROID, LEVOTHROID) 100 MCG tablet TAKE 1 TABLET BY MOUTH DAILY    meloxicam (MOBIC) 15 MG tablet Take 1 tablet by mouth Daily.    omeprazole (priLOSEC) 20 MG capsule Take 1 capsule by mouth Daily.    PARoxetine (PAXIL) 20 MG tablet Take 1 tablet by mouth Every Morning.    Tirzepatide-Weight Management (ZEPBOUND) 5 MG/0.5ML solution auto-injector Inject 0.5 mL under the skin into the appropriate area as directed 1 (One) Time Per Week.    tiZANidine (ZANAFLEX) 2 MG tablet Take 1 tablet by mouth At Night As Needed for Muscle Spasms.    [DISCONTINUED] Prasterone 6.5 MG insert Insert 1 suppository into the vagina Daily.     No current facility-administered medications on file prior to visit.         I have reviewed the patient's medical history in detail and updated the computerized patient record.  Review and summarization of old  records include:    Past Medical History:   Diagnosis Date    Allergic     Anxiety     Anxiety disorder 08/15/2019    Arthritis     Asthma     Chronic pain disorder     CTS (carpal tunnel syndrome)     Depression     Disease of thyroid gland     Dislocation of finger     Fracture, humerus     Frozen shoulder     Headache     Hyperlipidemia     Knee swelling     Low back strain     Obesity     Obsessive-compulsive disorder     Periarthritis of shoulder     Peripheral neuropathy     Shortness of breath     Suicide attempt     Urinary tract infection      Past Surgical History:   Procedure Laterality Date    BLADDER SURGERY      BREAST SURGERY      CHOLECYSTECTOMY      COLONOSCOPY      FRACTURE SURGERY  Collarbone when a child    HYSTERECTOMY      NASAL SEPTUM SURGERY      2019    REPAIR CHOANAL ATRESIA      SINUS SURGERY      THYROIDECTOMY      WISDOM TOOTH EXTRACTION       Social History     Occupational History    Not on file   Tobacco Use    Smoking status: Never    Smokeless tobacco: Never   Vaping Use    Vaping status: Never Used   Substance and Sexual Activity    Alcohol use: Yes     Alcohol/week: 2.0 standard drinks of alcohol     Types: 2 Drinks containing 0.5 oz of alcohol per week     Comment: rarely    Drug use: Never    Sexual activity: Yes     Partners: Male     Birth control/protection: Condom, Post-menopausal, None, Vasectomy      Social History     Social History Narrative    Not on file     Family History   Problem Relation Age of Onset    Sleep apnea Mother     Asthma Mother     Cancer Mother         Brain Cancer    Thyroid disease Mother     Arthritis Mother     Depression Mother     Anxiety disorder Mother     Suicide Attempts Mother     Heart disease Father     Thyroid disease Father     Depression Father     Dementia Father     Other Father         Parkisons and Dementia    Cancer Paternal Grandfather         Lung Cancer               ROS  Negative unless listed in the HPI        Physical  Exam  55 y.o. female  Body mass index is 36.81 kg/m²., 107 kg (235 lb)  Vitals:    06/04/25 1515   Resp: 20     General: Alert, cooperative, appears well and in no observable distress.   HEENT: Normocephalic, atraumatic on external visual inspection. No icterus.   CV: No significant peripheral edema.   Respiratory: Normal respiratory effort.   Skin: Warm & well perfused; appropriate skin turgor.  Psych: Appropriate mood & affect.  Neuro: Gross sensation and motor intact in affected extremity/extremities.  Vascular: Peripheral pulses palpable in affected extremity/extremities.         Procedure:  Large Joint Arthrocentesis: L greater trochanteric bursa  Date/Time: 6/4/2025 3:48 PM  Consent given by: patient  Site marked: site marked  Timeout: Immediately prior to procedure a time out was called to verify the correct patient, procedure, equipment, support staff and site/side marked as required   Supporting Documentation  Indications: pain and diagnostic evaluation   Procedure Details  Location: hip - L greater trochanteric bursa  Preparation: Patient was prepped and draped in the usual sterile fashion  Needle size: 25 G  Approach: lateral  Medications administered: 2 mL lidocaine PF 1% 1 %; 80 mg triamcinolone acetonide 40 MG/ML  Patient tolerance: patient tolerated the procedure well with no immediate complications            Assessment:   Diagnoses and all orders for this visit:    1. Left hip pain (Primary)  -     XR Hip With or Without Pelvis 2 - 3 View Left    2. Trochanteric bursitis of left hip    Other orders  -     Large Joint Arthrocentesis      Body mass index is 36.81 kg/m².  BMI consistent with Obese Class II: 35-39.9kg/m2        Plan:   -     Risks and benefits of injection therapy discussed with patient.  Possibility of bruising, pain, swelling, infection, increased blood sugar levels, cortisol flare and soft tissue atrophy discussed in detail.  Injected patient's left hip-greater trochanteric bursa  joint(s)with Kenalog from an lateral approach   Compression/brace   Rest, ice, compression, and elevation (RICE) therapy  OTC Tylenol for pain PRN  Follow up in 3 months or PRN for repeat injection   Please call with questions or concerns in the interim        Date of encounter: 06/04/2025   HIGINIO Jack

## 2025-06-09 RX ORDER — TIRZEPATIDE 5 MG/.5ML
INJECTION, SOLUTION SUBCUTANEOUS
Qty: 2 ML | Refills: 0 | Status: SHIPPED | OUTPATIENT
Start: 2025-06-09

## 2025-06-27 ENCOUNTER — TELEPHONE (OUTPATIENT)
Dept: FAMILY MEDICINE CLINIC | Facility: CLINIC | Age: 55
End: 2025-06-27

## 2025-06-27 ENCOUNTER — OFFICE VISIT (OUTPATIENT)
Dept: FAMILY MEDICINE CLINIC | Facility: CLINIC | Age: 55
End: 2025-06-27
Payer: COMMERCIAL

## 2025-06-27 VITALS
BODY MASS INDEX: 36.34 KG/M2 | WEIGHT: 232 LBS | TEMPERATURE: 98 F | HEART RATE: 66 BPM | DIASTOLIC BLOOD PRESSURE: 79 MMHG | OXYGEN SATURATION: 95 % | SYSTOLIC BLOOD PRESSURE: 127 MMHG

## 2025-06-27 DIAGNOSIS — E66.812 CLASS 2 SEVERE OBESITY WITH SERIOUS COMORBIDITY AND BODY MASS INDEX (BMI) OF 36.0 TO 36.9 IN ADULT, UNSPECIFIED OBESITY TYPE: Primary | ICD-10-CM

## 2025-06-27 DIAGNOSIS — E66.01 CLASS 2 SEVERE OBESITY WITH SERIOUS COMORBIDITY AND BODY MASS INDEX (BMI) OF 36.0 TO 36.9 IN ADULT, UNSPECIFIED OBESITY TYPE: Primary | ICD-10-CM

## 2025-06-27 PROCEDURE — 99214 OFFICE O/P EST MOD 30 MIN: CPT | Performed by: NURSE PRACTITIONER

## 2025-06-27 RX ORDER — PHENAZOPYRIDINE HYDROCHLORIDE 100 MG/1
100 TABLET, FILM COATED ORAL AS NEEDED
COMMUNITY
Start: 2025-06-26

## 2025-06-27 RX ORDER — MIRABEGRON 50 MG/1
50 TABLET, FILM COATED, EXTENDED RELEASE ORAL DAILY
COMMUNITY
Start: 2025-06-26 | End: 2025-09-24

## 2025-06-27 RX ORDER — CELECOXIB 100 MG/1
100 CAPSULE ORAL 2 TIMES DAILY
COMMUNITY
Start: 2025-06-23

## 2025-06-27 RX ORDER — METHENAMINE, BENZOIC ACID, PHENYL SALICYLATE, METHYLENE BLUE, AND HYOSCYAMINE SULFATE 9; .12; 81.6; 10.8; 36.2 MG/1; MG/1; MG/1; MG/1; MG/1
81.6 TABLET, COATED ORAL
COMMUNITY
Start: 2025-06-26

## 2025-06-27 NOTE — PROGRESS NOTES
Subjective     Jimena Welch is a 55 y.o. female.     History of Present Illness  Patient is here today for 3-month follow-up on weight management.  3 months ago she was started on Zepbound.  She is currently on Zepbound 5 mg weekly.  Patient is down 28 pounds.  She states that the weight loss seems to be slowing down  She is very active with her job  Denies nausea or vomiting  Has some constipation.  Denies CP, SOA, dizziness, HA  She is making better diet choices  Weight Management  Weight:  Decreased  Weight change (lbs):  26  Weight loss treatment:  Increasing exercise and prescription medications  Medication side effects:  Constipation  Treatment barriers:  None  Physical activity tolerance:  Stable  Energy level:  Unchanged       The following portions of the patient's history were reviewed and updated as appropriate: allergies, current medications, past family history, past medical history, past social history, past surgical history, and problem list.    Review of Systems   Constitutional:  Negative for chills, fatigue and fever.   Respiratory:  Negative for chest tightness and shortness of breath.    Cardiovascular:  Negative for chest pain and palpitations.   Gastrointestinal:  Positive for constipation. Negative for abdominal pain, nausea and vomiting.   Neurological:  Negative for dizziness and headache.       Objective     /79 (BP Location: Left arm, Patient Position: Sitting, Cuff Size: Large Adult)   Pulse 66   Temp 98 °F (36.7 °C) (Tympanic)   Wt 105 kg (232 lb)   SpO2 95%   BMI 36.34 kg/m²     Current Outpatient Medications on File Prior to Visit   Medication Sig Dispense Refill    celecoxib (CeleBREX) 100 MG capsule Take 1 capsule by mouth 2 (Two) Times a Day.      methenamine-hyoscamine-methylene blue-benzoic acid (Uribel) 81.6 MG tablet tablet Take 81.6 mg by mouth. Take 1 tablet by mouth four times daily as needed (bladder discomfort).      Mirabegron ER (MYRBETRIQ) 50 MG tablet  sustained-release 24 hour 24 hr tablet Take 50 mg by mouth Daily.      phenazopyridine (PYRIDIUM) 100 MG tablet Take 1 tablet by mouth As Needed.      Airsupra 90-80 MCG/ACT aerosol INHALE 2 PUFFS BY MOUTH EVERY 4 HOURS AS NEEDED FOR SHORTNESS OF AIR 10.7 g 1    albuterol sulfate  (90 Base) MCG/ACT inhaler       atorvastatin (LIPITOR) 10 MG tablet TAKE 1 TABLET BY MOUTH DAILY 90 tablet 4    azelastine (ASTELIN) 0.1 % nasal spray       estradiol (ESTRACE) 0.1 MG/GM vaginal cream Insert 1 g into the vagina Daily.      Estrogel 0.75 MG/1.25 GM (0.06%) topical gel       fluticasone (FLONASE) 50 MCG/ACT nasal spray into the nostril(s) as directed by provider.      Fluticasone Furoate 50 MCG/ACT aerosol powder  Inhale 1 puff Daily. Rinse mouth after each use. 30 each 5    levothyroxine (SYNTHROID, LEVOTHROID) 100 MCG tablet TAKE 1 TABLET BY MOUTH DAILY 90 tablet 0    omeprazole (priLOSEC) 20 MG capsule Take 1 capsule by mouth Daily.      PARoxetine (PAXIL) 20 MG tablet Take 1 tablet by mouth Every Morning.      tiZANidine (ZANAFLEX) 2 MG tablet Take 1 tablet by mouth At Night As Needed for Muscle Spasms. 30 tablet 0    [DISCONTINUED] meloxicam (MOBIC) 15 MG tablet Take 1 tablet by mouth Daily.      [DISCONTINUED] Zepbound 5 MG/0.5ML solution auto-injector INJECT 5 MG UNDER THE SKIN ONCE WEEKLY 2 mL 0     No current facility-administered medications on file prior to visit.                 Physical Exam  Constitutional:       General: She is not in acute distress.     Appearance: Normal appearance. She is not ill-appearing.   HENT:      Head: Normocephalic and atraumatic.   Eyes:      Extraocular Movements: Extraocular movements intact.   Pulmonary:      Effort: Pulmonary effort is normal. No respiratory distress.   Neurological:      General: No focal deficit present.      Mental Status: She is alert and oriented to person, place, and time.   Psychiatric:         Mood and Affect: Mood normal.         Behavior:  Behavior normal.         Thought Content: Thought content normal.         Judgment: Judgment normal.           Assessment & Plan     Diagnoses and all orders for this visit:    1. Class 2 severe obesity with serious comorbidity and body mass index (BMI) of 36.0 to 36.9 in adult, unspecified obesity type (Primary)  Comments:  improved  down 28lbs  cont zepbound- inc to 7.5mg weekly  work on diet and exercise  Orders:  -     Tirzepatide-Weight Management (ZEPBOUND) 7.5 MG/0.5ML solution auto-injector; Inject 0.5 mL under the skin into the appropriate area as directed 1 (One) Time Per Week.  Dispense: 2 mL; Refill: 2

## 2025-06-27 NOTE — TELEPHONE ENCOUNTER
Prior Authorization for Zepbound 7.5MG/0.5ML pen-injectors APPROVED.     Message from Plan    Request Reference Number: [id number]. ZEPBOUND INJ 7.5/0.5 is approved through 07/11/2025. Your patient may now fill this prescription and it will be covered.. Authorization Expiration Date: July 11, 2025.      Faxed approval to pharmacy.

## 2025-06-27 NOTE — TELEPHONE ENCOUNTER
Prior Auth completed for Zepbound 7.5MG/0.5ML pen-injectors via covermymeds. Pending determination within 72 hours minium.     (Key: [id number])  Rx #: [id number]    [employer] Electronic Prior Authorization Form    Sent with most recent chart notes, Summary of Care, medications list, any known allergies, and lab results.

## 2025-07-07 ENCOUNTER — OFFICE VISIT (OUTPATIENT)
Dept: SLEEP MEDICINE | Facility: CLINIC | Age: 55
End: 2025-07-07
Payer: COMMERCIAL

## 2025-07-07 VITALS
WEIGHT: 230 LBS | BODY MASS INDEX: 34.86 KG/M2 | SYSTOLIC BLOOD PRESSURE: 103 MMHG | OXYGEN SATURATION: 97 % | HEART RATE: 79 BPM | DIASTOLIC BLOOD PRESSURE: 50 MMHG | HEIGHT: 68 IN

## 2025-07-07 DIAGNOSIS — R63.4 WEIGHT LOSS: ICD-10-CM

## 2025-07-07 DIAGNOSIS — G47.00 INSOMNIA, UNSPECIFIED TYPE: ICD-10-CM

## 2025-07-07 DIAGNOSIS — G47.33 OBSTRUCTIVE SLEEP APNEA: Primary | ICD-10-CM

## 2025-07-07 DIAGNOSIS — E66.9 OBESITY (BMI 30-39.9): ICD-10-CM

## 2025-07-07 DIAGNOSIS — R06.83 SNORING: ICD-10-CM

## 2025-07-07 PROCEDURE — G0463 HOSPITAL OUTPT CLINIC VISIT: HCPCS

## 2025-07-07 PROCEDURE — 99214 OFFICE O/P EST MOD 30 MIN: CPT | Performed by: FAMILY MEDICINE

## 2025-07-07 NOTE — PROGRESS NOTES
"[facility] Up [facility] Note     Chief Complaint:  JACINOT     Primary Care Physician: Carolin Calero APRN    Interval History:   The patient is a 55 y.o. female  who was last seen in the sleep lab: 1/8/2025 for HST which showed AHI 10.5 lowest SpO2 84%.  Advised auto CPAP 8-16 cm H2O.  Presents here for first follow-up since starting PAP.  Issues with insomnia.  Reports has had weight loss with Zepbound since last visit. Weight at the time of HST was 113.9 kg; today was 104 kg.  251 pounds down to 230 pounds. Alternates with Unisom and another OTC med for sleep onset; now sleep latency is better with these meds; around 30-45 min. Also takes Mg supp.     Downloaded PAP Data Reviewed For Compliance:  JERILYN Hawkins.  Downloads between 6/7/2025 - 7/6/2025.  Average usage is 5 hours 30 minutes.  Average AHI is 1.0.  Average auto CPAP pressure is 10.4 cm H2O    I have reviewed the above results and compared them with the patient's last downloads and reviewed with the patient.    Review of Systems:    A complete review of systems was done and all were negative with the exception of anxiety depression shortness of breath nasal congestion    Social History:    Social History     Socioeconomic History    Marital status:    Tobacco Use    Smoking status: Never    Smokeless tobacco: Never   Vaping Use    Vaping status: Never Used   Substance and Sexual Activity    Alcohol use: Yes     Alcohol/week: 2.0 standard drinks of alcohol     Types: 2 Drinks containing 0.5 oz of alcohol per week     Comment: rarely    Drug use: Never    Sexual activity: Yes     Partners: Male     Birth control/protection: Condom, Post-menopausal, None, Vasectomy       Allergies:  Patient has no known allergies.     Medication Review:  Reviewed.      Vital Signs:    Vitals:    07/07/25 0900   BP: 103/50   BP Location: Left arm   Patient Position: Sitting   Pulse: 79   SpO2: 97%   Weight: 104 kg (230 lb)   Height: 172.7 cm (68\")     Body " mass index is 34.97 kg/m².    Physical Exam:    Constitutional:  Well developed 55 y.o. female that appears in no apparent distress.  Awake & oriented times 3.  Normal mood with normal recent and remote memory and normal judgement.  Eyes:  Conjunctivae normal.  Oropharynx: Previously, moist mucous membranes.    Self-administered [place] Sleepiness Scale test results:  9  0-5 Lower normal daytime sleepiness  6-10 Higher normal daytime sleepiness  11-12 Mild, 13-15 Moderate, & 16-24 Severe excessive daytime sleepiness    Impression:   1. Obstructive sleep apnea    2. Insomnia, unspecified type    3. Obesity (BMI 30-39.9)    4. Snoring    5. Weight loss        Obstructive sleep apnea adequately treated with auto CPAP 8-16. The patient appears to be at goal with good compliance and usage.  Still has interrupted sleep secondary to her interstitial cystitis.  30 pound weight loss.  Follow-up HST to see if mild JACINTO has resolved.  Amenable to referral to Dr. David Schwab's office for CBT-I; to be seen by intern.    Plan:  Good sleep hygiene measures should be maintained.  Weight loss would be beneficial in this patient who is obese by Body mass index is 34.97 kg/m²..      After evaluating the patient and assessing results available, the patient is benefiting from the treatment being provided.     The patient will continue pap.  After clinical evaluation and review of downloads, I recommend no changes to the patient's pressures.  A new prescription will be sent to the patient's DME.    Caution during activities that require prolonged concentration is strongly advised if sleepiness returns. Changing of PAP supplies regularly is important for effective use. Patient needs to change cushion on the mask or plugs on nasal pillows along with disposable filters once every month and change mask frame, tubing, headgear and Velcro straps every 6 months at the minimum.    I answered all of the patient's questions.  The patient will call  for any problems and will follow up in 6 Months.      Osvaldo Sunshine MD  Sleep Medicine  07/07/25  09:54 EDT

## 2025-07-30 RX ORDER — LEVOTHYROXINE SODIUM 100 UG/1
100 TABLET ORAL DAILY
Qty: 90 TABLET | Refills: 0 | Status: SHIPPED | OUTPATIENT
Start: 2025-07-30

## 2025-08-25 DIAGNOSIS — E66.01 CLASS 2 SEVERE OBESITY WITH SERIOUS COMORBIDITY AND BODY MASS INDEX (BMI) OF 36.0 TO 36.9 IN ADULT, UNSPECIFIED OBESITY TYPE: ICD-10-CM

## 2025-08-25 DIAGNOSIS — E66.812 CLASS 2 SEVERE OBESITY WITH SERIOUS COMORBIDITY AND BODY MASS INDEX (BMI) OF 36.0 TO 36.9 IN ADULT, UNSPECIFIED OBESITY TYPE: ICD-10-CM
